# Patient Record
Sex: MALE | Race: WHITE | NOT HISPANIC OR LATINO | Employment: OTHER | URBAN - METROPOLITAN AREA
[De-identification: names, ages, dates, MRNs, and addresses within clinical notes are randomized per-mention and may not be internally consistent; named-entity substitution may affect disease eponyms.]

---

## 2017-01-19 ENCOUNTER — OFFICE VISIT - HEALTHEAST (OUTPATIENT)
Dept: INTERNAL MEDICINE | Facility: CLINIC | Age: 77
End: 2017-01-19

## 2017-01-19 DIAGNOSIS — G47.00 INSOMNIA, UNSPECIFIED: ICD-10-CM

## 2017-01-19 DIAGNOSIS — F41.9 ANXIETY: ICD-10-CM

## 2017-01-19 DIAGNOSIS — E78.5 HYPERLIPEMIA: ICD-10-CM

## 2017-01-19 DIAGNOSIS — M17.11 PRIMARY OSTEOARTHRITIS OF RIGHT KNEE: ICD-10-CM

## 2017-01-19 LAB
CHOLEST SERPL-MCNC: 179 MG/DL
FASTING STATUS PATIENT QL REPORTED: YES
HDLC SERPL-MCNC: 59 MG/DL
LDLC SERPL CALC-MCNC: 111 MG/DL
TRIGL SERPL-MCNC: 46 MG/DL

## 2017-01-19 ASSESSMENT — MIFFLIN-ST. JEOR: SCORE: 1654.27

## 2017-01-20 ENCOUNTER — COMMUNICATION - HEALTHEAST (OUTPATIENT)
Dept: INTERNAL MEDICINE | Facility: CLINIC | Age: 77
End: 2017-01-20

## 2017-03-20 ENCOUNTER — TELEPHONE (OUTPATIENT)
Dept: GASTROENTEROLOGY | Facility: OUTPATIENT CENTER | Age: 77
End: 2017-03-20

## 2017-03-22 ENCOUNTER — TELEPHONE (OUTPATIENT)
Dept: GASTROENTEROLOGY | Facility: OUTPATIENT CENTER | Age: 77
End: 2017-03-22

## 2017-03-22 NOTE — TELEPHONE ENCOUNTER
Patient taking any blood thinners ? Advised pt. To stop aspirin 2 days prior to exam    Heart disease ? denies    Lung disease ? denies      Sleep apnea ? Cpap    Diabetic ? denies    Kidney disease ? denies    Dialysis ? n/a    Electronic implanted medical devices ? denies    Are you taking any narcotic pain medication ? no  What is your daily dosage ?    PTSD ? n/a    Prep instructions reviewed with patient ? Instructions,  policy, MAC sedation plan reviewed. Advised pt. To have someone stay with him post exam    Pharmacy : [facility]    Indication for procedure : family hx colon cancer    Referring provider : self

## 2017-03-26 ENCOUNTER — COMMUNICATION - HEALTHEAST (OUTPATIENT)
Dept: INTERNAL MEDICINE | Facility: CLINIC | Age: 77
End: 2017-03-26

## 2017-03-26 DIAGNOSIS — G47.00 INSOMNIA, UNSPECIFIED: ICD-10-CM

## 2017-03-27 ENCOUNTER — TRANSFERRED RECORDS (OUTPATIENT)
Dept: HEALTH INFORMATION MANAGEMENT | Facility: CLINIC | Age: 77
End: 2017-03-27

## 2017-04-04 ENCOUNTER — RECORDS - HEALTHEAST (OUTPATIENT)
Dept: ADMINISTRATIVE | Facility: OTHER | Age: 77
End: 2017-04-04

## 2017-04-11 ENCOUNTER — RECORDS - HEALTHEAST (OUTPATIENT)
Dept: ADMINISTRATIVE | Facility: OTHER | Age: 77
End: 2017-04-11

## 2017-04-12 ENCOUNTER — COMMUNICATION - HEALTHEAST (OUTPATIENT)
Dept: INTERNAL MEDICINE | Facility: CLINIC | Age: 77
End: 2017-04-12

## 2017-04-12 ENCOUNTER — OFFICE VISIT - HEALTHEAST (OUTPATIENT)
Dept: INTERNAL MEDICINE | Facility: CLINIC | Age: 77
End: 2017-04-12

## 2017-04-12 DIAGNOSIS — Z96.651 STATUS POST RIGHT KNEE REPLACEMENT: ICD-10-CM

## 2017-04-12 DIAGNOSIS — S52.90XA: ICD-10-CM

## 2017-04-12 DIAGNOSIS — Z01.818 PREOP EXAM FOR INTERNAL MEDICINE: ICD-10-CM

## 2017-04-12 ASSESSMENT — MIFFLIN-ST. JEOR: SCORE: 1712.78

## 2017-04-13 LAB
ATRIAL RATE - MUSE: 54 BPM
DIASTOLIC BLOOD PRESSURE - MUSE: NORMAL MMHG
INTERPRETATION ECG - MUSE: NORMAL
P AXIS - MUSE: 12 DEGREES
PR INTERVAL - MUSE: 288 MS
QRS DURATION - MUSE: 98 MS
QT - MUSE: 426 MS
QTC - MUSE: 403 MS
R AXIS - MUSE: -23 DEGREES
SYSTOLIC BLOOD PRESSURE - MUSE: NORMAL MMHG
T AXIS - MUSE: 10 DEGREES
VENTRICULAR RATE- MUSE: 54 BPM

## 2017-04-14 ENCOUNTER — RECORDS - HEALTHEAST (OUTPATIENT)
Dept: ADMINISTRATIVE | Facility: OTHER | Age: 77
End: 2017-04-14

## 2017-04-25 ENCOUNTER — RECORDS - HEALTHEAST (OUTPATIENT)
Dept: ADMINISTRATIVE | Facility: OTHER | Age: 77
End: 2017-04-25

## 2017-05-02 ENCOUNTER — TELEPHONE (OUTPATIENT)
Dept: SURGERY | Facility: CLINIC | Age: 77
End: 2017-05-02

## 2017-05-02 DIAGNOSIS — D36.9 ADENOMATOUS POLYPS: Primary | ICD-10-CM

## 2017-05-02 NOTE — TELEPHONE ENCOUNTER
Father Gagan is calling for Dr. Mancia. He reports after his last colonoscopy 3/27 Dr. Mancia mentioned he should get genetic testing done, new additional testing now available.    1. Will check with Lyla for orders.  2.  Dr. Mancia still doing colonoscopies at [facility]? A friend of his called to get appointment and was told he isn't there anymore.    Will route to Lyla, dr. Mancia's clinic nurse.

## 2017-05-08 NOTE — TELEPHONE ENCOUNTER
Left message with patient that the Cancer Risk Management Program will contact him for appt.  Message sent to CRMP schedulers.      Also informed that Dr. Mancia does colonoscopies at [facility].

## 2017-05-23 ENCOUNTER — COMMUNICATION - HEALTHEAST (OUTPATIENT)
Dept: INTERNAL MEDICINE | Facility: CLINIC | Age: 77
End: 2017-05-23

## 2017-05-23 DIAGNOSIS — G47.00 INSOMNIA, UNSPECIFIED: ICD-10-CM

## 2017-05-26 ENCOUNTER — RECORDS - HEALTHEAST (OUTPATIENT)
Dept: ADMINISTRATIVE | Facility: OTHER | Age: 77
End: 2017-05-26

## 2017-06-05 ENCOUNTER — OFFICE VISIT (OUTPATIENT)
Dept: ONCOLOGY | Facility: CLINIC | Age: 77
End: 2017-06-05
Attending: GENETIC COUNSELOR, MS
Payer: MEDICARE

## 2017-06-05 DIAGNOSIS — Z80.3 FAMILY HISTORY OF MALIGNANT NEOPLASM OF BREAST: ICD-10-CM

## 2017-06-05 DIAGNOSIS — D12.6 BENIGN NEOPLASM OF COLON, UNSPECIFIED PART OF COLON: Primary | ICD-10-CM

## 2017-06-05 DIAGNOSIS — Z80.0 FAMILY HISTORY OF COLON CANCER: ICD-10-CM

## 2017-06-05 DIAGNOSIS — D12.6 BENIGN NEOPLASM OF COLON, UNSPECIFIED PART OF COLON: ICD-10-CM

## 2017-06-05 PROCEDURE — 96040 ZZH GENETIC COUNSELING, EACH 30 MINUTES: CPT | Mod: ZF | Performed by: GENETIC COUNSELOR, MS

## 2017-06-05 NOTE — NURSING NOTE
Chief Complaint   Patient presents with     Blood Draw     blood labs collected from  arn venipuncuture, vitals taken     Sari Church RN

## 2017-06-05 NOTE — LETTER
"    Cancer Risk Management  Program Locations    [facility]  [facility]  Mailing Address  Cancer Risk Management Program  [facility]  [address] [place] [address]    New patient appointments  [phone]  June 7, 2017    Gagan FUENTES Gerson  [address]   [facility] [address]      Dear Father Bruce,    It was a pleasure meeting with you at the [facility] on June 5, 2017. Here is a copy of the progress note from your recent genetic counseling visit to the Cancer Risk Management Program. If you have any additional questions, please feel free to call.    6/5/2017    Referring Provider: Jefferson Mancia MD    Presenting Information:   I met with Father Gagan \"Bruce\" Gerson today for genetic counseling at the Cancer Risk Management Program at the [facility] to discuss his personal history of colon polyps and family history of colon cancer. He is here today to review this history, cancer screening recommendations, and available genetic testing options.    Of note, he was previously [place] by Marisa Latif [place], [facility] [place] and genetic testing for Nuñez syndrome was ordered (sequencing and deletion/duplication analysis of MLH1, MSH2, MSH6, and PMS2, as well as deletion/duplication analysis of EPCAM). This testing was normal/negative. Please see the clinic notes from 5/14/2014 and 8/14/2014 for more information.    Personal History:  Father Bruce is a 77 year old male. He does not have any personal history of cancer.    Father Bruce's most recent colonoscopy in March 2017 was normal and follow-up was recommended in three years. Previous colonoscopies in July 1996, October 2008, April 2012, and March 2013 identified a total of three tubular adenomas and one hyperplastic polyp. A colonoscopy report " from 1990 notes a further history of colon polyps, though no records are available regarding those prior exams. An upper endoscopy was performed in June 2015 to address abdominal pain that was normal. His most recent PSA in June 2015 was normal (2.70). He does not regularly do any other cancer screening at this time. Father Bruce reported no tobacco use and only occupational alcohol use.    Updated Family History: (Please see updated scanned pedigree for detailed family history information)    Father Bruce clarified that his daughter had a colon polyp removed in her 40's that contained a cancer; no other treatment was necessary.    Father Bruce reported that there are no other updates to his family history; please see the pedigree from 5/14/2014 for more detailed family history information.    Discussion:    Father Bruce's personal history of colon polyps and family history of colon cancer is suggestive of a possible hereditary cancer syndrome or familial clustering of cancer.    We reviewed the features of sporadic, familial, and hereditary cancers.  In looking at Father Bruce's family history, it is possible that a cancer susceptibility gene is present as he has three close relatives that have been diagnosed with colorectal cancer; one of these relatives was also diagnosed with cancer under age 50. It is also important to note that he has limited information regarding his extended relatives, which may underestimate the chance for a hereditary cancer syndrome. Other cancers in his family, such as his sister's breast cancer and brother's lung cancer, may be more consistent with sporadic/random cancers.    We discussed the natural history and genetics of several hereditary cancer syndromes, including Nuñez syndrome. A detailed handout regarding these syndromes and the information we discussed was provided to Gagan at the end of our appointment today and can be found in the after visit summary.  Topics included:  inheritance pattern, cancer risks, cancer screening recommendations, and also risks, benefits and limitations of testing.    We reviewed Father Bruce's prior genetic testing, which did not identify any mutations in the five genes associated with Nuñez syndrome (MLH1, MSH2, MSH6, PMS2, and EPCAM). Since 2014, though, a new test is available that can identify rare mutations in the MSH2 gene that could not be identified previously. As such, we reviewed that it would be reasonable to test the genes associated with Nuñez syndrome again. Of note, Gagan's relatives with colon cancer meet [place] II criteria.      We discussed that there are also other additional genes that could cause increased risk of colon cancer and polyps. For example, mutations in the CHEK2 gene are associated with moderately increased risk for colon and breast cancer. Also, mutations in the POLD1 and POLE genes can be identified in families that meet [place] criteria. As many of these genes present with overlapping features in a family, it would be reasonable for Father Bruce to consider panel genetic testing to analyze multiple genes at once.    Father Bruce explained that he would be interested in gathering as much information as possible from genetic testing. As such, we reviewed the ColoNext gene panel.    Genetic testing is available for 17 genes associated with increased risk for colon cancer and polyps: ColoNext (APC, BMPR1A, CDH1, CHEK2, GREM1, EPCAM, MLH1, MSH2, MSH6, MUTYH, PMS2, POLD1, POLE, PTEN, SMAD4, STK11, and TP53).    We discussed that some of the genes in the ColoNext panel are associated with specific hereditary cancer syndromes and have published management guidelines: Nuñez syndrome (MLH1, MSH2, MSH6, PMS2, EPCAM), Familial Adenomatous Polyposis (APC), MUTYH Associated Polyposis (MUTYH), Juvenile Polyposis syndrome (SMAD4, BMPR1A), Peutz-Jeghers syndrome (STK11), Cowden syndrome (PTEN), Li Fraumeni syndrome (TP53), and  Hereditary Diffuse Gastric Cancer (CDH1).     The CHEK2, GREM1, POLD1, and POLE genes have also been associated with increased colon cancer risk and have published management guidelines.    Father Bruce was provided with a detailed brochure from [employer] explaining the ColoNext testing.    Consent was obtained and genetic testing for ColoNext was sent to [facility]. Turn around time: 5 weeks.    Medical Management: The information from genetic testing may determine additional cancer screening recommendations (i.e. more frequent colonoscopies and/or upper endoscopies, thyroid exams, annual dermatologic exams, etc.) as well as options for risk reducing surgeries (i.e. surgery to remove the colon, etc.) for Gagan and his relatives. These recommendations will be discussed in detail once genetic testing is completed.    Plan:  1) Today Gagan elected to proceed with genetic testing using the ColoNext gene panel offered by [employer].  2) This information should be available in approximately 4 weeks.  3) Gagan will return to clinic to discuss the results.    Face to face time: 40 minutes    Laura Francis MS, [facility]  Certified Genetic Counselor  Office: [phone]  Pager: [phone]

## 2017-06-05 NOTE — PROGRESS NOTES
"6/5/2017    Referring Provider: Jefferson Mancia MD    Presenting Information:   I met with Father Gagan \"Bruce\"Gerson today for genetic counseling at the Cancer Risk Management Program at the [facility] to discuss his personal history of colon polyps and family history of colon cancer.  He is here today to review this history, cancer screening recommendations, and available genetic testing options.    Of note, he was previously seen by Marisa Latif MS, [facility] on 5/14/2014 and genetic testing for Nuñez syndrome was ordered (sequencing and deletion/duplication analysis of MLH1, MSH2, MSH6, and PMS2, as well as deletion/duplication analysis of EPCAM). This testing was normal/negative. Please see the clinic notes from 5/14/2014 and 8/14/2014 for more information.    Personal History:  Father Bruce is a 77 year old male. He does not have any personal history of cancer.    Father Bruce's most recent colonoscopy in March 2017 was normal and follow-up was recommended in three years. Previous colonoscopies in July 1996, October 2008, April 2012, and March 2013 identified a total of three tubular adenomas and one hyperplastic polyp. A colonoscopy report from 1990 notes a further history of colon polyps, though no records are available regarding those prior exams. An upper endoscopy was performed in June 2015 to address abdominal pain that was normal. His most recent PSA in June 2015 was normal (2.70). He does not regularly do any other cancer screening at this time. Father Bruce reported no tobacco use and only occupational alcohol use.    Updated Family History: (Please see updated scanned pedigree for detailed family history information)    Father Bruce clarified that his daughter had a colon polyp removed in her 40's that contained a cancer; no other treatment was necessary.    Father Bruce reported that there are no other updates to his family history; please see the pedigree from 5/14/2014 for more detailed " family history information.    Discussion:    Father Bruce's personal history of colon polyps and family history of colon cancer is suggestive of a possible hereditary cancer syndrome or familial clustering of cancer.    We reviewed the features of sporadic, familial, and hereditary cancers.  In looking at Father Bruce's family history, it is possible that a cancer susceptibility gene is present as he has three close relatives that have been diagnosed with colorectal cancer; one of these relatives was also diagnosed with cancer under age 50. It is also important to note that he has limited information regarding his extended relatives, which may underestimate the chance for a hereditary cancer syndrome. Other cancers in his family, such as his sister's breast cancer and brother's lung cancer, may be more consistent with sporadic/random cancers.    We discussed the natural history and genetics of several hereditary cancer syndromes, including Nuñez syndrome. A detailed handout regarding these syndromes and the information we discussed was provided to Gagan at the end of our appointment today and can be found in the after visit summary.  Topics included: inheritance pattern, cancer risks, cancer screening recommendations, and also risks, benefits and limitations of testing.    We reviewed Father Bruce's prior genetic testing, which did not identify any mutations in the five genes associated with Nuñez syndrome (MLH1, MSH2, MSH6, PMS2, and EPCAM). Since 2014, though, a new test is available that can identify rare mutations in the MSH2 gene that could not be identified previously. As such, we reviewed that it would be reasonable to test the genes associated with Nuñze syndrome again. Of note, Gagan's relatives with colon cancer meet [place] II criteria.      We discussed that there are also other additional genes that could cause increased risk of colon cancer and polyps. For example, mutations in the CHEK2 gene are  associated with moderately increased risk for colon and breast cancer. Also, mutations in the POLD1 and POLE genes can be identified in families that meet [place] criteria. As many of these genes present with overlapping features in a family, it would be reasonable for Father Bruce to consider panel genetic testing to analyze multiple genes at once.    Father Bruce explained that he would be interested in gathering as much information as possible from genetic testing. As such, we reviewed the ColoNext gene panel.    Genetic testing is available for 17 genes associated with increased risk for colon cancer and polyps: ColoNext (APC, BMPR1A, CDH1, CHEK2, GREM1, EPCAM, MLH1, MSH2, MSH6, MUTYH, PMS2, POLD1, POLE, PTEN, SMAD4, STK11, and TP53).    We discussed that some of the genes in the ColoNext panel are associated with specific hereditary cancer syndromes and have published management guidelines: Nuñez syndrome (MLH1, MSH2, MSH6, PMS2, EPCAM), Familial Adenomatous Polyposis (APC), MUTYH Associated Polyposis (MUTYH), Juvenile Polyposis syndrome (SMAD4, BMPR1A), Peutz-Jeghers syndrome (STK11), Cowden syndrome (PTEN), Li Fraumeni syndrome (TP53), and Hereditary Diffuse Gastric Cancer (CDH1).     The CHEK2, GREM1, POLD1, and POLE genes have also been associated with increased colon cancer risk and have published management guidelines.    Father Bruce was provided with a detailed brochure from [employer] explaining the ColoNext testing.    Consent was obtained and genetic testing for ColoNext was sent to [facility]. Turn around time: 5 weeks.    Medical Management: The information from genetic testing may determine additional cancer screening recommendations (i.e. more frequent colonoscopies and/or upper endoscopies, thyroid exams, annual dermatologic exams, etc.) as well as options for risk reducing surgeries (i.e. surgery to remove the colon, etc.) for Gagan and his relatives. These recommendations  will be discussed in detail once genetic testing is completed.    Plan:  1) Today Gagan elected to proceed with genetic testing using the ColoNext gene panel offered by [employer].  2) This information should be available in approximately 4 weeks.  3) Gagan will return to clinic to discuss the results.    Face to face time: 45 minutes    Laura Francis MS, [facility]  Certified Genetic Counselor  Office: [phone]  Pager: [phone]

## 2017-06-05 NOTE — LETTER
"6/5/2017       RE: Gagan Nieto  [address]  [place] [address]     Dear Colleague,    Thank you for referring your patient, Gagan Nieto, to the [facility]. Please see a copy of my visit note below.    6/5/2017    Referring Provider: Jefferson Mancia MD    Presenting Information:   I met with Father Gagan \"Bruce\"Gerson today for genetic counseling at the Cancer Risk Management Program at the [facility] to discuss his personal history of colon polyps and family history of colon cancer.  He is here today to review this history, cancer screening recommendations, and available genetic testing options.    Of note, he was previously seen by Marisa Latif MS, [facility] on 5/14/2014 and genetic testing for Nuñez syndrome was ordered (sequencing and deletion/duplication analysis of MLH1, MSH2, MSH6, and PMS2, as well as deletion/duplication analysis of EPCAM). This testing was normal/negative. Please see the clinic notes from 5/14/2014 and 8/14/2014 for more information.    Personal History:  Father Bruce is a 77 year old male. He does not have any personal history of cancer.    Father Bruce's most recent colonoscopy in March 2017 was normal and follow-up was recommended in three years. Previous colonoscopies in July 1996, October 2008, April 2012, and March 2013 identified a total of three tubular adenomas and one hyperplastic polyp. A colonoscopy report from 1990 notes a further history of colon polyps, though no records are available regarding those prior exams. An upper endoscopy was performed in June 2015 to address abdominal pain that was normal. His most recent PSA in June 2015 was normal (2.70). He does not regularly do any other cancer screening at this time. Father Bruce reported no tobacco use and only occupational alcohol use.    Updated Family History: (Please see updated scanned pedigree for detailed family history information)    Father Bruce clarified that his " daughter had a colon polyp removed in her 40's that contained a cancer; no other treatment was necessary.    Father Bruce reported that there are no other updates to his family history; please see the pedigree from 5/14/2014 for more detailed family history information.    Discussion:    Father Bruce's personal history of colon polyps and family history of colon cancer is suggestive of a possible hereditary cancer syndrome or familial clustering of cancer.    We reviewed the features of sporadic, familial, and hereditary cancers.  In looking at Father Bruce's family history, it is possible that a cancer susceptibility gene is present as he has three close relatives that have been diagnosed with colorectal cancer; one of these relatives was also diagnosed with cancer under age 50. It is also important to note that he has limited information regarding his extended relatives, which may underestimate the chance for a hereditary cancer syndrome. Other cancers in his family, such as his sister's breast cancer and brother's lung cancer, may be more consistent with sporadic/random cancers.    We discussed the natural history and genetics of several hereditary cancer syndromes, including Nuñez syndrome. A detailed handout regarding these syndromes and the information we discussed was provided to Gagan at the end of our appointment today and can be found in the after visit summary.  Topics included: inheritance pattern, cancer risks, cancer screening recommendations, and also risks, benefits and limitations of testing.    We reviewed Father Bruce's prior genetic testing, which did not identify any mutations in the five genes associated with Nuñez syndrome (MLH1, MSH2, MSH6, PMS2, and EPCAM). Since 2014, though, a new test is available that can identify rare mutations in the MSH2 gene that could not be identified previously. As such, we reviewed that it would be reasonable to test the genes associated with Nuñez syndrome again.  Of note, Gagan's relatives with colon cancer meet [place] II criteria.      We discussed that there are also other additional genes that could cause increased risk of colon cancer and polyps. For example, mutations in the CHEK2 gene are associated with moderately increased risk for colon and breast cancer. Also, mutations in the POLD1 and POLE genes can be identified in families that meet [place] criteria. As many of these genes present with overlapping features in a family, it would be reasonable for Father Bruce to consider panel genetic testing to analyze multiple genes at once.    Father Bruce explained that he would be interested in gathering as much information as possible from genetic testing. As such, we reviewed the ColoNext gene panel.    Genetic testing is available for 17 genes associated with increased risk for colon cancer and polyps: ColoNext (APC, BMPR1A, CDH1, CHEK2, GREM1, EPCAM, MLH1, MSH2, MSH6, MUTYH, PMS2, POLD1, POLE, PTEN, SMAD4, STK11, and TP53).    We discussed that some of the genes in the ColoNext panel are associated with specific hereditary cancer syndromes and have published management guidelines: Nuñez syndrome (MLH1, MSH2, MSH6, PMS2, EPCAM), Familial Adenomatous Polyposis (APC), MUTYH Associated Polyposis (MUTYH), Juvenile Polyposis syndrome (SMAD4, BMPR1A), Peutz-Jeghers syndrome (STK11), Cowden syndrome (PTEN), Li Fraumeni syndrome (TP53), and Hereditary Diffuse Gastric Cancer (CDH1).     The CHEK2, GREM1, POLD1, and POLE genes have also been associated with increased colon cancer risk and have published management guidelines.    Father Bruce was provided with a detailed brochure from [employer] explaining the ColoNext testing.    Consent was obtained and genetic testing for ColoNext was sent to [facility]. Turn around time: 5 weeks.    Medical Management: The information from genetic testing may determine additional cancer screening recommendations (i.e.  more frequent colonoscopies and/or upper endoscopies, thyroid exams, annual dermatologic exams, etc.) as well as options for risk reducing surgeries (i.e. surgery to remove the colon, etc.) for Gagan and his relatives. These recommendations will be discussed in detail once genetic testing is completed.    Plan:  1) Today Gagan elected to proceed with genetic testing using the ColoNext gene panel offered by [employer].  2) This information should be available in approximately 4 weeks.  3) Gagan will return to clinic to discuss the results.    Face to face time: 45 minutes    Laura Francis MS, [facility]  Certified Genetic Counselor  Office: [phone]  Pager: [phone]

## 2017-06-05 NOTE — PATIENT INSTRUCTIONS
Assessing Cancer Risk  Only about 5-10% of cancers are thought to be due to an inherited cancer susceptibility gene.    These families often have:  ? Several people with the same or related types of cancer  ? Cancers diagnosed at a young age (before age 50)  ? Individuals with more than one primary cancer  ? Multiple generations of the family affected with cancer    Comprehensive Colon Cancer Panel  We each inherit two copies of every gene in our bodies: one from our mother, and one from our father.  Each gene has a specific job to do.  When a gene has a mistake or  mutation  in it, it does not work like it should.      This handout will review common hereditary colon cancer syndromes, and other genes related to an increased risk for colon cancer.  The genes that will be discussed in this handout are: APC, BMPR1A, CDH1, CHEK2, EPCAM, GREM1, MLH1, MSH2, MSH6, MUTYH, PMS2, POLD1, POLE, PTEN, SMAD4, STK11, and TP53.  These genes are clinically actionable, meaning there are published guidelines for cancer screening and management for individuals who are found to carry mutations in these genes. Inheriting a mutation does not mean a person will develop cancer, but it does significantly increase his or her risk above the general population risk.      Familial Adenomatous Polyposis (FAP)  FAP is a hereditary cancer syndrome caused by mutations in the APC gene. The condition is known to cause hundreds to thousands of adenomatous polyps in the colon, creating a  carpet  of polyps. Some individuals have what is called attenuated FAP (AFAP), a milder form of FAP with fewer polyps and typically later onset. Individuals with an APC gene mutation are at an increased risk for colon, thyroid, and duodenal cancers, as well as several other types of cancer1.  Other features of this condition may include: osteomas, dental anomalies, benign skin lesions, CHRPE ( freckle  on the inside of the eye), and desmoid tumors.      Lifetime  Cancer Risks     Cancer Type General Population FAP   Colon  5% near 100%   Thyroid (papillary) 1% 1-12%   Duodenal <1% 5%   Liver  <1% 1-2% before age 5   Pancreas <1% 1%   Stomach <1% 1%       Juvenile Polyposis Syndrome (JPS)  JPS is characterized by hamartomatous polyps, called juvenile polyps, in the gastrointestinal tract.  Juvenile  refers to the type of polyps seen in this hereditary cancer condition, not the age of onset. Currently, mutations in two genes are known to cause JPS: BMPR1A and SMAD4. Of individuals clinically diagnosed with JPS, only 40% have an identifiable mutation in one of these genes, suggesting there are other genes that cause JPS that have not been discovered yet. Individuals with JPS are at an increased risk for colon cancer and stomach cancer 2,3,4. Pancreatic and small bowel cancers have also been reported in JPS, but the actual risks are unknown.         Lifetime Cancer Risks    Cancer Type General Population JPS   Colon 5% 40-50%   Gastric/Duodenal <1% 10-21%     Some individuals with SMAD4 mutations have a condition called JPS/HHT (Juvenile Polyposis/Hereditary Hemorrhagic Telangiectasia) where in addition to JPS, individuals may have nose bleeds and clotting issues.     Hereditary Diffuse Gastric Cancer (HDGC)  Currently, mutations in one gene are known to cause Hereditary Diffuse Gastric Cancer: CDH1.  Individuals with HDGC are at increased risk for diffuse gastric cancer and lobular breast cancer. Of people diagnosed with HDGC, 30-50% have a mutation in the CDH1 gene.  This suggests there are likely mutations in other genes that may cause HDGC that have not been identified yet. Individuals with HDGC may also be at increased risk for colon cancer.      Lifetime Cancer Risks    Cancer Type General Population HDGC   Diffuse Gastric <1% 67-83%   Breast 12% 39-52%     Nuñez syndrome  Mutations in five different genes are known to cause Nuñez syndrome: MLH1, MSH2, MSH6, PMS2, and  EPCAM. Individuals with Nuñez syndrome have an increased risk for colon, uterine, ovarian, small bowel, stomach, urinary tract, and brain cancer, as well as several other types of cancer. The exact lifetime cancer risks are dependent upon the gene in which the mutation was identified.      Lifetime Cancer Risks    Cancer Type General Population Nuñez syndrome   Colon 5% 10-80%   Uterine 2-3% 15-60%   Stomach <1% 6-13%   Ovarian 2% 4-24%   Urinary tract <1% 1-7%   Hepatobiliary tract <1% 1-4%   Small bowel <1% 3-6%   Brain/CNS <1% 1-3%   Pancreas <1% 1-6%       MUTYH-Associated Polyposis (MAP)  MAP is a hereditary cancer syndrome caused by mutations in the MUTYH gene. Unlike the other hereditary cancer genes discussed in this handout, two mutations in the MUTYH gene cause MAP and increase cancer risk. Those affected with MAP typically have between  adenomatous polyps. This syndrome also increases the risk for colon and duodenal cancer. Current research suggests that other cancers may be associated with MUTYH mutations, as well. The table below includes the risk that someone with two MUTYH gene mutations would develop cancer in their lifetime; of note, there is also an increased colon cancer risk for individuals who carry only one MUTYH gene mutation5,6,7.       Lifetime Cancer Risks    Cancer Type General Population MAP   Colon 5% %   Duodenal  <1% 5%       Cowden syndrome  Cowden syndrome is a hereditary condition that increases the risk for breast, thyroid, endometrial, colon, and kidney cancer.  A single mutation in the PTEN gene causes Cowden syndrome and increases cancer risk.  The table below shows the chance that someone with a PTEN mutation would develop cancer in their lifetime8,9.  Other benign features seen in some individuals with Cowden syndrome include benign skin lesions (facial papules, keratoses, lipomas), learning disabilities, autism, thyroid nodules, hamartomatous colon polyps, and  larger head size.      Lifetime Cancer Risks   Cancer Type General Population Cowden Syndrome   Breast 12% 25-50%*   Thyroid 1% 35%   Renal 1-2% 35%   Endometrial 2-3% 28%   Colon 5% 9%   Melanoma 2-3% >5%     *One recent study found breast cancer risk to be increased to 85%    Peutz-Jeghers syndrome (PJS)  PJS is a hereditary cancer syndrome caused by mutations in the STK11 gene. This condition can be distinguished from other hereditary syndromes by the presence of hamartomatous polyps in the gastrointestinal tract and freckles present in unusual places such as the hands, feet, neck, and lips. Individuals with Peutz-Jeghers syndrome have an increased risk for colon, breast, pancreatic, and other cancers3.  Men are at risk for testicular tumors which can affect hormones in the body. Women are at risk for sex cord tumors of the ovaries and a rare aggressive type of cervical cancer.     Lifetime Cancer Risks    Cancer Type General Population PJS   Breast 12% 45-50%   Colon 5% 39%   Stomach <1% 29%   Pancreas 1.5% 11-36%   Small Intestine <1% 13%     Ovarian  2%  18%   Lung 6-7% 15-17%     Additional Genes Associated with Increased Colon Cancer Risk  CHEK2  CHEK2 is a moderate-risk breast cancer gene.  Women who have a mutation in CHEK2 have around a 2-fold increased risk for breast cancer compared to the general population, and this risk may be higher depending upon family history.12,13,14.  Mutations in CHEK2 have also been shown to increase the risk of a number of other cancers, including colon and prostate, however these cancer risks are currently not well understood.     GREM1  GREM1 is a moderate-risk colon polyposis gene. Duplications of this gene are more commonly found in individuals with [employer] [id number]. Mutations in GREM1 are associated with colon polyps and therefore an increased risk of colon cancer; however the estimated cancer risk is not well [id number].     POLD1 and POLE  POLD1 and POLE  are moderate-risk colon cancer genes. Carriers of a mutation in one of these genes increases the lifetime risk of colorectal [id number],18,19,20. Mutations in these genes may also be associated with increased risk for other cancers including: endometrial cancer, duodenal adenomas and carcinomas, and brain tumors.    TP53  Li Fraumeni syndrome (LFS) is a hereditary cancer predisposition syndrome. LFS is caused by a mutation in the TP53 gene. A single mutation in one of the copies of TP53 increases the risk for multiple cancers. Individuals with LFS are at an increased risk for developing cancer at a young age. The general lifetime risk for development of cancer is 50% by age 30 and 90% by age 60.      Core Cancers: Sarcomas, Breast, Brain, Lung, Leukemias/Lymphomas, Adrenocortical carcinomas  Other Cancers: Gastrointestinal, Thyroid, Skin, Genitourinary    Genetic Testing  Genetic testing involves a simple blood test and will look at the genetic information in genes associated with an increased risk of colon cancer. The tests look for any harmful mutations that are associated with increased cancer risk.  If possible, it is recommended that the person(s) who has had cancer be tested before other family members.  That person will give us the most useful information about whether or not a specific gene mutation is associated with the cancer in the family.     Results  There are three possible results from genetic testing:  ? Positive--a harmful mutation was identified  ? Negative--no mutation was identified  ? Variant of unknown significance--a variation in one of the genes was identified, but it is unclear how this impacts cancer risk in the family  Advantages and Disadvantages  There are advantages and disadvantages to genetic testing of these genes.    Advantages  ? May clarify your cancer risk  ? Can help you make medical decisions  ? May explain the cancers in your family  ? May give useful information to your family  members (if you share your results)    Disadvantages  ? Possible negative emotional impact of learning about inherited cancer risk  ? Uncertainty in interpreting a negative test result in some situations  ? Possible genetic discrimination concerns (see below)    Inheritance   Most mutations in the genes outlined above are inherited in an autosomal dominant pattern.  This means that if a parent has a mutation, each of his or her children will have a 50% chance of inheriting that same mutation.  Therefore, each child--male or female--would have a 50% chance of being at increased risk for developing cancer.                                            Image obtained from [employer] Reference, 2013     In the case of MUTYH-Associated Polyposis (MAP) this hereditary cancer syndrome is inherited in an autosomal recessive pattern. This means that each parent of an individual with MAP is a carrier of MAP, meaning that they have only one mutation in MUTYH. They still have one functioning copy of their gene.  Carriers are at a slightly higher risk for colon cancer than the general population. If each parent is a carrier for MAP, they have a 25% of having a child who is affected with MAP, meaning the child inherited both gene mutations - one from each parent.       Image obtained from [employer] Reference, 2016    Genetic Information Nondiscrimination Act (TOD)  TOD is a federal law that protects individuals from health insurance or employment discrimination based on a genetic test result alone.  Although rare, there are currently no legal protections in terms of life insurance, long term care, or disability insurances.  Visit the National Human Genome Research [place] at Genome.gov/65970970 to learn more.    Reducing Cancer Risk  Each of the genes listed within this handout have nationally recognized cancer screening guidelines that would be recommended for individuals who test positive.  In addition to increased  cancer screening, surgeries may be offered or recommended to reduce cancer risk in certain cases.  Recommendations are based upon an individual s genetic test result as well as their personal and family history of cancer.    Questions to Think About Regarding Genetic Testing  ? What effect will the test result have on me and my relationship with my family members if I have an inherited gene mutation?  If I don t have a gene mutation?  ? Should I share my test results, and how will my family react to this news, which may also affect them?  ? Are my children ready to learn new information that may one day affect their own health?    Resources    PTEN World PTENworld.[employer]   No Stomach for Cancer, Inc. nostomachforcancer.org   Stomach Cancer Relief Network scrnet.org   Collaborative Group of the Americas on Inherited Colorectal Cancer (CGA) cgaicc.com   Cancer Care cancercare.org   American Cancer Society (ACS) cancer.org   National Cancer [place] (NCI) cancer.org   Nuñez Syndrome International [email or web address]       Please call us if you have any questions or concerns.     Cancer Risk Management Program [phone]-CANCER ([phone])  ? Ronna Daryl, MS, [facility]  [phone]  ? Gale Chanel, MS, [facility]  [phone]  ? Laura Francis, MS, [facility]  [phone]  ? Rosalina Eldridge, MS, [facility]  [phone]  ? Tiaraon Eriberto, MS, [facility]  [phone]    References    1. Shon ROA, Tano J, Mohan G, Bruce-Renny E, Roma J, et al. The Prevalence of thyroid cancer and benign thyroid disease in patients with familial adenomatous polyposis may be higher than previously recognized. Clin Colorectal Cancer. 2012;11:304-308.  2. Alphonsos L, Van Liliana A, Kat L, Omega C, Ruchi K, et al. Risk of colorectal cancer in juvenile polyposis. Gut. 2007;56:965-967.  3. Henry FG, Dom MN, Robin CA. Colorectal cancer risk in hamartomatous polyposis syndromes. World Journal of Gastrointestinal Surgery. 2015;27:25-32  4. Marley CARMICHAEL.  Guidance on gastrointestinal surveillance for hereditary non-polyposis colorectal cancer, familial adenomatous polypolis, juvenile polyposis, and Peutz-Jeghers syndrome. Gut. 2002;51:21-27.  5. Harley AK, Mika PENNY, Lois JG et al. Risk of extracolonic cancers for people with biallelic and monoallelic mutations in MUTYH. Int J of Cancer. 2016;139:5525-9246.  6. Allan S, Jayesh S, Silviano H, Anahi K, Blankenship M, et al. MUTYH-associated polyposis: 70 of 71 patients with biallelic mutations present with an attenuated or atypical phenotype. Int J of Cancer. 2006;119:807-814.  7. Marvin G, Theresa F, Luis I, Henrique M, Marvin H, et al. MUTYH mutation carriers have increased breast cancer risk. Cancer. 2012;5345-5774.  8. Tafoya MH, Marii J, Yannick J, July LA, Vero MS, Eng C. Lifetime cancer risks in individuals with germline PTEN mutations. Clin Cancer Res. 2012;18:400-7.  9. Ildefonso R. Cowden Syndrome: A Critical Review of the Clinical Literature. J Nica . 2009:18:13-27.  10. National Comprehensive Cancer Network. Clinical practice guidelines in oncology, colorectal cancer screening. Available online (registration required). 2013.  11. National Cancer [place]. SEER Cancer Stat Fact Sheets.  December 2013.  12. CHEK2 Breast Cancer Case-Control Consortium. CHEK2*1100delC and susceptibility to breast cancer: A collaborative analysis involving 10,860 breast cancer cases and 9,065 controls from 10 studies. Am J Hum Nica, 74 (2004), pp. 3656-0013  13. Niki T, Levon S, Edilberto K, et al. Spectrum of Mutations in BRCA1, BRCA2, CHEK2, and TP53 in Families at High Risk of Breast Cancer. CHETAN. 2006;295(12):4392-6308.  14. Ronald KENDALL, Romy D, Zain A, et al. Risk of breast cancer in women with a CHEK2 mutation with and without a family history of breast cancer. J Clin Oncol. 2011;29:2030-4909.  15. Jama FUENTES, Maria Antonia ROA, Phong J, Mar N, Luis MENSAH et al. Defining the polyposis/colorectal cancer phenotype  associated with the GREM1 duplication: counseling and management guidelines. Nica .Res. 2016;98:1-5.  16. Jelani ROA, Jennifre S, Fahad A, Abebe Rios, et al. Hereditary mixed polyposis syndrome is caused by a 40kb upstream duplication that leads to increased and ectopic expression of the BMP antagonist GREM1. Joellen Nica. 2015;44:699-703.  17. FAIZA Gatica. et al. Germline mutations affecting the proofreading domains of POLE and POLD1 predispose to colorectal adenomas and carcinomas. Joellen. Nica. 45, 136-44 (2013).  18. SCAR Welsh. et al. POLE and POLD1 mutations in 529 desiree with familial colorectal cancer and/or polyposis: review of reported cases and recommendations for genetic testing and surveillance. Nica. Med. (2015). doi:10.1038/gim.2015.75  19. CHRISTOPHE Menjivar et al. New insights into POLE and POLD1 germline mutations in familial colorectal cancer and polyposis. Hum. Mol. Nica. 23, 7776-12 (2014).  20. TELMA Whitt. et al. Frequency and phenotypic spectrum of germline mutations in POLE and seven other polymerase genes in 266 patients with colorectal adenomas and carcinomas. Int. J. Cancer 137, 320-31 (2015).

## 2017-06-05 NOTE — MR AVS SNAPSHOT
After Visit Summary   6/5/2017    Gagan Nieto    MRN: [id number]           Patient Information     Date Of Birth          1940        Visit Information        Provider Department      6/5/2017 11:15 AM Laura Francis GC;  2 [address] CONSULT [facility]        Today's Diagnoses     Benign neoplasm of colon, unspecified part of colon    -  1    Family history of colon cancer        Family history of malignant neoplasm of breast          Care Instructions      Assessing Cancer Risk  Only about 5-10% of cancers are thought to be due to an inherited cancer susceptibility gene.    These families often have:  ? Several people with the same or related types of cancer  ? Cancers diagnosed at a young age (before age 50)  ? Individuals with more than one primary cancer  ? Multiple generations of the family affected with cancer    Comprehensive Colon Cancer Panel  We each inherit two copies of every gene in our bodies: one from our mother, and one from our father.  Each gene has a specific job to do.  When a gene has a mistake or  mutation  in it, it does not work like it should.      This handout will review common hereditary colon cancer syndromes, and other genes related to an increased risk for colon cancer.  The genes that will be discussed in this handout are: APC, BMPR1A, CDH1, CHEK2, EPCAM, GREM1, MLH1, MSH2, MSH6, MUTYH, PMS2, POLD1, POLE, PTEN, SMAD4, STK11, and TP53.  These genes are clinically actionable, meaning there are published guidelines for cancer screening and management for individuals who are found to carry mutations in these genes. Inheriting a mutation does not mean a person will develop cancer, but it does significantly increase his or her risk above the general population risk.      Familial Adenomatous Polyposis (FAP)  FAP is a hereditary cancer syndrome caused by mutations in the APC gene. The condition is known to cause hundreds to thousands of adenomatous  polyps in the colon, creating a  carpet  of polyps. Some individuals have what is called attenuated FAP (AFAP), a milder form of FAP with fewer polyps and typically later onset. Individuals with an APC gene mutation are at an increased risk for colon, thyroid, and duodenal cancers, as well as several other types of cancer1.  Other features of this condition may include: osteomas, dental anomalies, benign skin lesions, CHRPE ( freckle  on the inside of the eye), and desmoid tumors.      Lifetime Cancer Risks     Cancer Type General Population FAP   Colon  5% near 100%   Thyroid (papillary) 1% 1-12%   Duodenal <1% 5%   Liver  <1% 1-2% before age 5   Pancreas <1% 1%   Stomach <1% 1%       Juvenile Polyposis Syndrome (JPS)  JPS is characterized by hamartomatous polyps, called juvenile polyps, in the gastrointestinal tract.  Juvenile  refers to the type of polyps seen in this hereditary cancer condition, not the age of onset. Currently, mutations in two genes are known to cause JPS: BMPR1A and SMAD4. Of individuals clinically diagnosed with JPS, only 40% have an identifiable mutation in one of these genes, suggesting there are other genes that cause JPS that have not been discovered yet. Individuals with JPS are at an increased risk for colon cancer and stomach cancer 2,3,4. Pancreatic and small bowel cancers have also been reported in JPS, but the actual risks are unknown.         Lifetime Cancer Risks    Cancer Type General Population JPS   Colon 5% 40-50%   Gastric/Duodenal <1% 10-21%     Some individuals with SMAD4 mutations have a condition called JPS/HHT (Juvenile Polyposis/Hereditary Hemorrhagic Telangiectasia) where in addition to JPS, individuals may have nose bleeds and clotting issues.     Hereditary Diffuse Gastric Cancer (HDGC)  Currently, mutations in one gene are known to cause Hereditary Diffuse Gastric Cancer: CDH1.  Individuals with HDGC are at increased risk for diffuse gastric cancer and lobular  breast cancer. Of people diagnosed with HDGC, 30-50% have a mutation in the CDH1 gene.  This suggests there are likely mutations in other genes that may cause HDGC that have not been identified yet. Individuals with HDGC may also be at increased risk for colon cancer.      Lifetime Cancer Risks    Cancer Type General Population HDGC   Diffuse Gastric <1% 67-83%   Breast 12% 39-52%     Nuñez syndrome  Mutations in five different genes are known to cause Nuñez syndrome: MLH1, MSH2, MSH6, PMS2, and EPCAM. Individuals with Nuñez syndrome have an increased risk for colon, uterine, ovarian, small bowel, stomach, urinary tract, and brain cancer, as well as several other types of cancer. The exact lifetime cancer risks are dependent upon the gene in which the mutation was identified.      Lifetime Cancer Risks    Cancer Type General Population Nuñez syndrome   Colon 5% 10-80%   Uterine 2-3% 15-60%   Stomach <1% 6-13%   Ovarian 2% 4-24%   Urinary tract <1% 1-7%   Hepatobiliary tract <1% 1-4%   Small bowel <1% 3-6%   Brain/CNS <1% 1-3%   Pancreas <1% 1-6%       MUTYH-Associated Polyposis (MAP)  MAP is a hereditary cancer syndrome caused by mutations in the MUTYH gene. Unlike the other hereditary cancer genes discussed in this handout, two mutations in the MUTYH gene cause MAP and increase cancer risk. Those affected with MAP typically have between  adenomatous polyps. This syndrome also increases the risk for colon and duodenal cancer. Current research suggests that other cancers may be associated with MUTYH mutations, as well. The table below includes the risk that someone with two MUTYH gene mutations would develop cancer in their lifetime; of note, there is also an increased colon cancer risk for individuals who carry only one MUTYH gene mutation5,6,7.       Lifetime Cancer Risks    Cancer Type General Population MAP   Colon 5% %   Duodenal  <1% 5%       Cowden syndrome  Cowden syndrome is a hereditary  condition that increases the risk for breast, thyroid, endometrial, colon, and kidney cancer.  A single mutation in the PTEN gene causes Cowden syndrome and increases cancer risk.  The table below shows the chance that someone with a PTEN mutation would develop cancer in their lifetime8,9.  Other benign features seen in some individuals with Cowden syndrome include benign skin lesions (facial papules, keratoses, lipomas), learning disabilities, autism, thyroid nodules, hamartomatous colon polyps, and larger head size.      Lifetime Cancer Risks   Cancer Type General Population Cowden Syndrome   Breast 12% 25-50%*   Thyroid 1% 35%   Renal 1-2% 35%   Endometrial 2-3% 28%   Colon 5% 9%   Melanoma 2-3% >5%     *One recent study found breast cancer risk to be increased to 85%    Peutz-Jeghers syndrome (PJS)  PJS is a hereditary cancer syndrome caused by mutations in the STK11 gene. This condition can be distinguished from other hereditary syndromes by the presence of hamartomatous polyps in the gastrointestinal tract and freckles present in unusual places such as the hands, feet, neck, and lips. Individuals with Peutz-Jeghers syndrome have an increased risk for colon, breast, pancreatic, and other cancers3.  Men are at risk for testicular tumors which can affect hormones in the body. Women are at risk for sex cord tumors of the ovaries and a rare aggressive type of cervical cancer.     Lifetime Cancer Risks    Cancer Type General Population PJS   Breast 12% 45-50%   Colon 5% 39%   Stomach <1% 29%   Pancreas 1.5% 11-36%   Small Intestine <1% 13%     Ovarian  2%  18%   Lung 6-7% 15-17%     Additional Genes Associated with Increased Colon Cancer Risk  CHEK2  CHEK2 is a moderate-risk breast cancer gene.  Women who have a mutation in CHEK2 have around a 2-fold increased risk for breast cancer compared to the general population, and this risk may be higher depending upon family history.12,13,14.  Mutations in CHEK2 have also  been shown to increase the risk of a number of other cancers, including colon and prostate, however these cancer risks are currently not well understood.     GREM1  GREM1 is a moderate-risk colon polyposis gene. Duplications of this gene are more commonly found in individuals with Ashkenazi Anglican [id number]. Mutations in GREM1 are associated with colon polyps and therefore an increased risk of colon cancer; however the estimated cancer risk is not well [id number].     POLD1 and POLE  POLD1 and POLE are moderate-risk colon cancer genes. Carriers of a mutation in one of these genes increases the lifetime risk of colorectal crghnl98,18,19,20. Mutations in these genes may also be associated with increased risk for other cancers including: endometrial cancer, duodenal adenomas and carcinomas, and brain tumors.    TP53  Li Fraumeni syndrome (LFS) is a hereditary cancer predisposition syndrome. LFS is caused by a mutation in the TP53 gene. A single mutation in one of the copies of TP53 increases the risk for multiple cancers. Individuals with LFS are at an increased risk for developing cancer at a young age. The general lifetime risk for development of cancer is 50% by age 30 and 90% by age 60.      Core Cancers: Sarcomas, Breast, Brain, Lung, Leukemias/Lymphomas, Adrenocortical carcinomas  Other Cancers: Gastrointestinal, Thyroid, Skin, Genitourinary    Genetic Testing  Genetic testing involves a simple blood test and will look at the genetic information in genes associated with an increased risk of colon cancer. The tests look for any harmful mutations that are associated with increased cancer risk.  If possible, it is recommended that the person(s) who has had cancer be tested before other family members.  That person will give us the most useful information about whether or not a specific gene mutation is associated with the cancer in the family.     Results  There are three possible results from genetic  testing:  ? Positive--a harmful mutation was identified  ? Negative--no mutation was identified  ? Variant of unknown significance--a variation in one of the genes was identified, but it is unclear how this impacts cancer risk in the family  Advantages and Disadvantages  There are advantages and disadvantages to genetic testing of these genes.    Advantages  ? May clarify your cancer risk  ? Can help you make medical decisions  ? May explain the cancers in your family  ? May give useful information to your family members (if you share your results)    Disadvantages  ? Possible negative emotional impact of learning about inherited cancer risk  ? Uncertainty in interpreting a negative test result in some situations  ? Possible genetic discrimination concerns (see below)    Inheritance   Most mutations in the genes outlined above are inherited in an autosomal dominant pattern.  This means that if a parent has a mutation, each of his or her children will have a 50% chance of inheriting that same mutation.  Therefore, each child--male or female--would have a 50% chance of being at increased risk for developing cancer.                                            Image obtained from [employer], 2013     In the case of MUTYH-Associated Polyposis (MAP) this hereditary cancer syndrome is inherited in an autosomal recessive pattern. This means that each parent of an individual with MAP is a carrier of MAP, meaning that they have only one mutation in MUTYH. They still have one functioning copy of their gene.  Carriers are at a slightly higher risk for colon cancer than the general population. If each parent is a carrier for MAP, they have a 25% of having a child who is affected with MAP, meaning the child inherited both gene mutations - one from each parent.       Image obtained from [employer], 2016    Genetic Information Nondiscrimination Act (TOD)  TOD is a federal law that protects individuals  from health insurance or employment discrimination based on a genetic test result alone.  Although rare, there are currently no legal protections in terms of life insurance, long term care, or disability insurances.  Visit the National Human Genome Research [place] at Genome.gov/36284595 to learn more.    Reducing Cancer Risk  Each of the genes listed within this handout have nationally recognized cancer screening guidelines that would be recommended for individuals who test positive.  In addition to increased cancer screening, surgeries may be offered or recommended to reduce cancer risk in certain cases.  Recommendations are based upon an individual s genetic test result as well as their personal and family history of cancer.    Questions to Think About Regarding Genetic Testing  ? What effect will the test result have on me and my relationship with my family members if I have an inherited gene mutation?  If I don t have a gene mutation?  ? Should I share my test results, and how will my family react to this news, which may also affect them?  ? Are my children ready to learn new information that may one day affect their own health?    Resources    [facility] World [email or web address]   No Stomach for Cancer, Inc. [email or web address]   Stomach Cancer Relief Network [email or web address]   Collaborative Group of the Americas on Inherited Colorectal Cancer (CGA) cgaicc.com   Cancer Care cancercare.[email or web address]   American Cancer Society (ACS) cancer.org   National Cancer [place] (NCI) cancer.org   Nuñez Syndrome International [email or web address]       Please call us if you have any questions or concerns.     Cancer Risk Management Program [phone]-CANCER ([phone])  ? Ronna Brito, MS, [facility]  [phone]  ? Gale Buchanan, MS, [facility]  [phone]  ? Laura Francis, MS, [facility]  [phone]  ? Rosalina Eldridge, MS, [facility]  [phone]  ? Dorita Wei, MS, [facility]  [phone]    References    1. Shon ROA, Tano FUENTES, Marques CANALES, Dulce ROA, Roma FUENTES, et al. The  Prevalence of thyroid cancer and benign thyroid disease in patients with familial adenomatous polyposis may be higher than previously recognized. Clin Colorectal Cancer. 2012;11:304-308.  2. José Miguel ALDRIDGE, Ricardo Ford A, Kat ALDRIDGE, Omega KENDALL, Ruchi ANN, et al. Risk of colorectal cancer in juvenile polyposis. Gut. 2007;56:965-967.  3. Henry FG, Dom MN, Robin CA. Colorectal cancer risk in hamartomatous polyposis syndromes. World Journal of Gastrointestinal Surgery. 2015;27:25-32  4. Marley MG. Guidance on gastrointestinal surveillance for hereditary non-polyposis colorectal cancer, familial adenomatous polypolis, juvenile polyposis, and Peutz-Jeghers syndrome. Gut. 2002;51:21-27.  5. Harley AK, Mika PENNY, Lois FUENTESG et al. Risk of extracolonic cancers for people with biallelic and monoallelic mutations in MUTYH. Int J of Cancer. 2016;139:3750-7350.  6. Allan S, Jayesh S, Silviano H, Anahi K, Blankenship M, et al. MUTYH-associated polyposis: 70 of 71 patients with biallelic mutations present with an attenuated or atypical phenotype. Int J of Cancer. 2006;119:807-814.  7. Marvin G, Theresa F, Luis I, Pinleobardo M, Marvin H, et al. MUTYH mutation carriers have increased breast cancer risk. Cancer. 2012;6668-6276.  8. Tafoya MH, Marii J, Yannick J, July LA, Vero MS, Eng C. Lifetime cancer risks in individuals with germline PTEN mutations. Clin Cancer Res. 2012;18:400-7.  9. Pilarski R. Cowden Syndrome: A Critical Review of the Clinical Literature. J Nica . 2009:18:13-27.  10. National Comprehensive Cancer Network. Clinical practice guidelines in oncology, colorectal cancer screening. Available online (registration required). 2013.  11. National Cancer [place]. SEER Cancer Stat Fact Sheets.  December 2013.  12. CHEK2 Breast Cancer Case-Control Consortium. CHEK2*1100delC and susceptibility to breast cancer: A collaborative analysis involving 10,860 breast cancer cases and 9,065 controls from 10 studies. Am  J Hum Nica, 74 (2004), pp. 2003-6987  13. Niki T, Levon S, Edilberto K, et al. Spectrum of Mutations in BRCA1, BRCA2, CHEK2, and TP53 in Families at High Risk of Breast Cancer. CHETAN. 2006;295(12):1144-6515.  14. Ronald KENDALL, Romy D, Zain FLORENCE, et al. Risk of breast cancer in women with a CHEK2 mutation with and without a family history of breast cancer. J Clin Oncol. 2011;29:3108-0113.  15. Jama FUENTES, Maria Antonia E, Phong J, Mar N, Luis M et al. Defining the polyposis/colorectal cancer phenotype associated with the GREM1 duplication: counseling and management guidelines. Nica .Res. 2016;98:1-5.  16. Jelani ROA, Jennifer S, Fahad FLORENCE, Abebe Rios, et al. Hereditary mixed polyposis syndrome is caused by a 40kb upstream duplication that leads to increased and ectopic expression of the BMP antagonist GREM1. Joellen Nica. 2015;44:699-703.  17. FAIZA Gatica. et al. Germline mutations affecting the proofreading domains of POLE and POLD1 predispose to colorectal adenomas and carcinomas. Joellen. Nica. 45, 136-44 (2013).  18. SCAR Welsh. et al. POLE and POLD1 mutations in 529 desiree with familial colorectal cancer and/or polyposis: review of reported cases and recommendations for genetic testing and surveillance. Nica. Med. (2015). doi:10.1038/gim.2015.75  19. CHRISTOPHE Menjivar et al. New insights into POLE and POLD1 germline mutations in familial colorectal cancer and polyposis. Hum. Mol. Nica. 23, 1966-12 (2014).  20. Peri I. et al. Frequency and phenotypic spectrum of germline mutations in POLE and seven other polymerase genes in 266 patients with colorectal adenomas and carcinomas. Int. J. Cancer 137, 320-31 (2015).           Follow-ups after your visit        Follow-up notes from your care team     Return in about 4 weeks (around 7/3/2017).      Your next 10 appointments already scheduled     Jun 05, 2017 12:30 PM [facility]   [facility] Lab Draw with  e[facility] LAB DRAW   [facility] Lab Draw ([facility]  "[place])    [address] [id number] [phone]              Future tests that were ordered for you today     Open Future Orders        Priority Expected Expires Ordered    [facility] genetic testing, [place] Test: Laboratory Miscellaneous Order Routine  2018            Who to contact     If you have questions or need follow up information about today's clinic visit or your schedule please contact [facility] directly at [phone].  Normal or non-critical lab and imaging results will be communicated to you by Neomendhart, letter or phone within 4 business days after the clinic has received the results. If you do not hear from us within 7 days, please contact the clinic through [facility] Kid[employer] or phone. If you have a critical or abnormal lab result, we will notify you by phone as soon as possible.  Submit refill requests through ClipClock or call your pharmacy and they will forward the refill request to us. Please allow 3 business days for your refill to be completed.          Additional Information About Your Visit        ClipClock Information     ClipClock lets you send messages to your doctor, view your test results, renew your prescriptions, schedule appointments and more. To sign up, go to [email or web address] . Click on \"Log in\" on the left side of the screen, which will take you to the Welcome page. Then click on \"Sign up Now\" on the right side of the page.     You will be asked to enter the access code listed below, as well as some personal information. Please follow the directions to create your username and password.     Your access code is: [email or web address]  Expires: 2017  3:33 PM     Your access code will  in 90 days. If you need help or a new code, please call your [facility] clinic or [phone].        Care EveryWhere ID     This is your Care EveryWhere ID. This could be used by other organizations to access your [facility] medical " records  [id number]         Blood Pressure from Last 3 Encounters:   09/14/15 128/77   08/27/15 102/73   08/03/15 114/65    Weight from Last 3 Encounters:   09/14/15 98 kg (216 lb)   08/27/15 101.5 kg (223 lb 12.8 oz)   08/03/15 104.6 kg (230 lb 11.2 oz)               Primary Care Provider    No Ref-Primary Verified       No address on file        Thank you!     Thank you for choosing [facility]  for your care. Our goal is always to provide you with excellent care. Hearing back from our patients is one way we can continue to improve our services. Please take a few minutes to complete the written survey that you may receive in the mail after your visit with us. Thank you!             Your Updated Medication List - Protect others around you: Learn how to safely use, store and throw away your medicines at www.disposemymeds.org.          This list is accurate as of: 6/5/17 12:10 PM.  Always use your most recent med list.                   Brand Name Dispense Instructions for use    aspirin 81 MG tablet      Take 1 tablet by mouth daily.       AZITHROMYCIN PO      Take 250 mg by mouth daily       cholecalciferol 1000 UNIT tablet    vitamin D     Take 5 tablets by mouth daily.       fluticasone 50 MCG/ACT spray    FLONASE    2 Package    Spray 1 spray into both nostrils 2 times daily       METAMUCIL PO          MIRALAX PO          omeprazole 40 MG capsule    priLOSEC    90 capsule    Take 1 capsule (40 mg) by mouth daily       ranitidine 300 MG tablet    ZANTAC    30 tablet    Take 1 tablet (300 mg) by mouth At Bedtime       REFRESH 1 % ophthalmic solution   Generic drug:  carboxymethylcellulose      Place 1 drop into both eyes 3 times daily.       * tamsulosin 0.4 MG capsule    FLOMAX    90 capsule    Take 1 capsule (0.4 mg) by mouth daily       * tamsulosin 0.4 MG capsule    FLOMAX    60 capsule    Take 1 capsule (0.4 mg) by mouth daily       zolpidem 10 MG tablet    AMBIEN    30 tablet    Take 1  tablet (10 mg) by mouth nightly as needed for sleep       * Notice:  This list has 2 medication(s) that are the same as other medications prescribed for you. Read the directions carefully, and ask your doctor or other care provider to review them with you.

## 2017-06-06 LAB — MISCELLANEOUS TEST: NORMAL

## 2017-07-02 LAB — LAB SCANNED RESULT: NORMAL

## 2017-07-03 ENCOUNTER — OFFICE VISIT - HEALTHEAST (OUTPATIENT)
Dept: INTERNAL MEDICINE | Facility: CLINIC | Age: 77
End: 2017-07-03

## 2017-07-03 DIAGNOSIS — A69.20 LYME DISEASE: ICD-10-CM
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
## 2017-07-07 ENCOUNTER — OFFICE VISIT (OUTPATIENT)
Dept: ONCOLOGY | Facility: CLINIC | Age: 77
End: 2017-07-07
Attending: GENETIC COUNSELOR, MS
Payer: MEDICARE

## 2017-07-07 DIAGNOSIS — D12.6 BENIGN NEOPLASM OF COLON, UNSPECIFIED PART OF COLON: Primary | ICD-10-CM

## 2017-07-07 DIAGNOSIS — Z80.0 FAMILY HISTORY OF COLON CANCER: ICD-10-CM

## 2017-07-07 PROCEDURE — 40000072 ZZH STATISTIC GENETIC COUNSELING, < 16 MIN: Mod: ZF | Performed by: GENETIC COUNSELOR, MS

## 2017-07-07 NOTE — MR AVS SNAPSHOT
"              After Visit Summary   2017    Gagan Nieto    MRN: [id number]           Patient Information     Date Of Birth          1940        Visit Information        Provider Department      2017 3:30 PM Laura Francis GC;  [address] [facility]        Today's Diagnoses     Benign neoplasm of colon, unspecified part of colon    -  1    Family history of colon cancer           Follow-ups after your visit        Who to contact     If you have questions or need follow up information about today's clinic visit or your schedule please contact [facility] directly at [phone].  Normal or non-critical lab and imaging results will be communicated to you by QUIQhart, letter or phone within 4 business days after the clinic has received the results. If you do not hear from us within 7 days, please contact the clinic through QUIQhart or phone. If you have a critical or abnormal lab result, we will notify you by phone as soon as possible.  Submit refill requests through Quack or call your pharmacy and they will forward the refill request to us. Please allow 3 business days for your refill to be completed.          Additional Information About Your Visit        MyChart Information     Quack lets you send messages to your doctor, view your test results, renew your prescriptions, schedule appointments and more. To sign up, go to [email or web address] . Click on \"Log in\" on the left side of the screen, which will take you to the Welcome page. Then click on \"Sign up Now\" on the right side of the page.     You will be asked to enter the access code listed below, as well as some personal information. Please follow the directions to create your username and password.     Your access code is: [email or web address]  Expires: 2017  3:33 PM     Your access code will  in 90 days. If you need help or a new code, please call your [place] clinic or [phone].   "      Care EveryWhere ID     This is your Care EveryWhere ID. This could be used by other organizations to access your [place] medical records  [id number]         Blood Pressure from Last 3 Encounters:   09/14/15 128/77   08/27/15 102/73   08/03/15 114/65    Weight from Last 3 Encounters:   09/14/15 98 kg (216 lb)   08/27/15 101.5 kg (223 lb 12.8 oz)   08/03/15 104.6 kg (230 lb 11.2 oz)              Today, you had the following     No orders found for display       Primary Care Provider    No Ref-Primary Verified       No address on file        Equal Access to Services     [facility] : Hadii amy Stout, lona henry, jean elam, ginette love . So [facility] [phone].    ATENCIÓN: Si habla español, tiene a andino disposición servicios gratuitos de asistencia lingüística. [facility] [phone].    We comply with applicable federal civil rights laws and [place] laws. We do not discriminate on the basis of race, color, national origin, age, disability sex, sexual orientation or gender identity.            Thank you!     Thank you for choosing [facility]  for your care. Our goal is always to provide you with excellent care. Hearing back from our patients is one way we can continue to improve our services. Please take a few minutes to complete the written survey that you may receive in the mail after your visit with us. Thank you!             Your Updated Medication List - Protect others around you: Learn how to safely use, store and throw away your medicines at www.disposemymeds.org.          This list is accurate as of: 7/7/17 11:59 PM.  Always use your most recent med list.                   Brand Name Dispense Instructions for use Diagnosis    aspirin 81 MG tablet      Take 1 tablet by mouth daily.        AZITHROMYCIN PO      Take 250 mg by mouth daily        cholecalciferol 1000 UNIT tablet    vitamin D     Take 5 tablets by mouth daily.         fluticasone 50 MCG/ACT spray    FLONASE    2 Package    Spray 1 spray into both nostrils 2 times daily    Allergic rhinitis, cause unspecified       METAMUCIL PO           MIRALAX PO           omeprazole 40 MG capsule    priLOSEC    90 capsule    Take 1 capsule (40 mg) by mouth daily    Esophageal reflux       ranitidine 300 MG tablet    ZANTAC    30 tablet    Take 1 tablet (300 mg) by mouth At Bedtime    Esophageal reflux       REFRESH 1 % ophthalmic solution   Generic drug:  carboxymethylcellulose      Place 1 drop into both eyes 3 times daily.        * tamsulosin 0.4 MG capsule    FLOMAX    90 capsule    Take 1 capsule (0.4 mg) by mouth daily    BPH (benign prostatic hyperplasia)       * tamsulosin 0.4 MG capsule    FLOMAX    60 capsule    Take 1 capsule (0.4 mg) by mouth daily    BPH (benign prostatic hyperplasia)       zolpidem 10 MG tablet    AMBIEN    30 tablet    Take 1 tablet (10 mg) by mouth nightly as needed for sleep    Insomnia       * Notice:  This list has 2 medication(s) that are the same as other medications prescribed for you. Read the directions carefully, and ask your doctor or other care provider to review them with you.

## 2017-07-07 NOTE — LETTER
Cancer Risk Management  Program Locations    [facility]  [facility]  Mailing Address  Cancer Risk Management Program  [facility]  [address] [place] [address]    New patient appointments  [phone]  July 8, 2017    Gagan Nieto  [address]   [place] [address]      Dear Father Bruce,    It was a pleasure meeting with you again at the [facility] on July 7, 2017. Here is a copy of the progress note from your recent genetic counseling visit to the Cancer Risk Management Program. If you have any additional questions, please feel free to call.    7/7/2017    Referring Provider: Jefferson Mancia MD    Presenting Information:  Father Gagan Nieto returned to the Cancer Risk Management Program at the [facility] to discuss his genetic testing results. His blood was drawn on 6/5/2017.  Nuñez Syndrome Analyses with the ColoNext gene panel was ordered from [employer]. This testing was done because of his personal history of colon polyps and family history of colon cancer.    Genetic Testing Result: NEGATIVE  Father Bruce is negative for mutations in the APC, BMPR1A, CDH1, CHEK2, EPCAM, GREM1, MLH1, MSH2, MSH6, MUTYH, PMS2, POLD1, POLE, PTEN, SMAD4, STK11, and TP53 genes. No mutations were found in any of the 17 genes analyzed.  This test involved sequencing and deletion/duplication analysis of all genes with the exceptions of EPCAM and GREM1 (deletions and duplications only).    Testing did not detect an identifiable mutation associated with Nuñez syndrome (MLH1, MSH2, MSH6, PMS2, EPCAM), Familial Adenomatous Polyposis (APC), Hereditary Diffuse Gastric Cancer (CDH1), Juvenile Polyposis syndrome (BMPR1A, SMAD4), Cowden syndrome (PTEN), Li Fraumeni syndrome (TP53), Peutz-Jeghers  "syndrome (STK11), and MUTYH Associated Polyposis (MUTYH).     A copy of the test report can be found in the Laboratory tab, dated 6/5/2017, and named \"SEND OUTS [facility] TEST\". The report is scanned in as a linked document.    Interpretation:  We discussed several different interpretations of this negative test result.    1. One explanation may be that there is a different gene or combination of genes and environment that are associated with Father Bruce's colon polyps and family history of colon cancer. We reviewed that additional genetic testing may be available in the future that can identify a genetic/hereditary explanation for his history. As such, Father Bruce is encouraged to contact me annually to review any new genetic testing options that may be appropriate for him.    2. It is possible that another relative, such as his brothers and/or mother with colon cancer, did have a mutation in one of these 17 genes and Father Bruce did not inherit it. If that is the case, his colon polyps may be sporadic findings caused by random cellular changes.  3. There is also a small possibility that there is a mutation in one of these genes, and we could not find it with our current testing methods.       Screening:  Based on this negative test result, it is important for Father Bruce and his relatives to refer back to the family history for appropriate cancer screening.      Father Bruce should continue to follow all colon cancer screening recommendations as made by his medical providers. For example, it was recommended he have another colonoscopy three years after his last exam in 2017.  If his daughter was diagnosed with colon cancer: per current National Comprehensive Cancer Network (NCCN) guidelines, individuals with a first degree relative with colorectal cancer should begin colonoscopy at age 40, or 10 years before the earliest diagnosis of colorectal cancer, whichever is first. This would mean Father Bruce's sons would begin " colonoscopy in the mid 30's and repeated every 5-10 years, or per colonoscopy findings.  If his daughter did not have colon cancer: per current NCCN guidelines, individuals with a second-degree relative with  colon cancer diagnosed less than age 50, should start colonoscopy at age 50, and should be repeated every 5-10 years, or per colonoscopy findings. This recommendation may instead apply to Father Bruce's sons.     Father Bruce is strongly encouraged to clarify whether his daughter was diagnosed with colon cancer in order to better understand which of the above screening recommendations are appropriate for his sons.      Other population cancer screening options, such as those recommended by the American Cancer Society and NCCN, are appropriate for Father Bruce and his family. These screening recommendations may change if there are changes to Father Bruce's personal and/or family history.  Final screening recommendations should be made by each individual's managing physician.    Inheritance:  We reviewed the autosomal dominant inheritance of mutations in these 17 genes.  We discussed that Father Bruce did not pass on an identifiable mutation in these genes to his children based on this test result.  Mutations in these gene do not skip generations.      Additional Testing:  Although Father Bruce's genetic testing result was negative, other relatives may still carry a gene mutation associated with hereditary colon cancer. Genetic counseling is recommended for his daughter to discuss genetic testing options if she was diagnosed with colon cancer. The children of his brother with colon cancer could also consider meeting with a genetic counselor.    Summary:  We do not have an explanation for Father Bruce's personal history of polyps and family history of colon cancer. Because of that, it is important that he continue with cancer screening based on his personal and family history as discussed above.    Genetic testing is  rapidly advancing, and new cancer susceptibility genes will most likely be identified in the future.  Therefore, I encouraged Father Bruce to contact me annually or if there are changes in his personal or family history.  This may change how we assess his cancer risk, screening, and the testing we would offer.    Plan:  1.  I provided Father Bruce with a copy of his test results today.  2. He plans to follow-up with his medical providers, including Dr. Mancia.  3. He should contact me annually, or sooner if his family history changes.    If Father Bruce has any further questions, I encouraged him to contact me at [phone].    Time spent face to face: 15 minutes    Laura Francis MS, JD [facility]  Certified Genetic Counselor  Office: [phone]  Pager: [phone]

## 2017-07-07 NOTE — PROGRESS NOTES
"7/7/2017    Referring Provider: Jefferson Mancia MD    Presenting Information:  Father Gagan Nieto returned to the Cancer Risk Management Program at the [facility] to discuss his genetic testing results. His blood was drawn on 6/5/2017.  Nuñez Syndrome Analyses with the ColoNext gene panel was ordered from [employer]. This testing was done because of his personal history of colon polyps and family history of colon cancer.    Genetic Testing Result: NEGATIVE  Father Bruce is negative for mutations in the APC, BMPR1A, CDH1, CHEK2, EPCAM, GREM1, MLH1, MSH2, MSH6, MUTYH, PMS2, POLD1, POLE, PTEN, SMAD4, STK11, and TP53 genes. No mutations were found in any of the 17 genes analyzed.  This test involved sequencing and deletion/duplication analysis of all genes with the exceptions of EPCAM and GREM1 (deletions and duplications only).    Testing did not detect an identifiable mutation associated with Nuñez syndrome (MLH1, MSH2, MSH6, PMS2, EPCAM), Familial Adenomatous Polyposis (APC), Hereditary Diffuse Gastric Cancer (CDH1), Juvenile Polyposis syndrome (BMPR1A, SMAD4), Cowden syndrome (PTEN), Li Fraumeni syndrome (TP53), Peutz-Jeghers syndrome (STK11), and MUTYH Associated Polyposis (MUTYH).     A copy of the test report can be found in the Laboratory tab, dated 6/5/2017, and named \"SEND OUTS [facility] [place] [facility] TEST\". The report is scanned in as a linked document.    Interpretation:  We discussed several different interpretations of this negative test result.    1. One explanation may be that there is a different gene or combination of genes and environment that are associated with Father Bruce's colon polyps and family history of colon cancer. We reviewed that additional genetic testing may be available in the future that can identify a genetic/hereditary explanation for his history. As such, Father Bruce is encouraged to contact me annually to review any new genetic testing options that may be appropriate for him.    2. It " is possible that another relative, such as his brothers and/or mother with colon cancer, did have a mutation in one of these 17 genes and Father Bruce did not inherit it. If that is the case, his colon polyps may be sporadic findings caused by random cellular changes.  3. There is also a small possibility that there is a mutation in one of these genes, and we could not find it with our current testing methods.       Screening:  Based on this negative test result, it is important for Father Bruce and his relatives to refer back to the family history for appropriate cancer screening.      Father Bruce should continue to follow all colon cancer screening recommendations as made by his medical providers. For example, it was recommended he have another colonoscopy three years after his last exam in 2017.  If his daughter was diagnosed with colon cancer: per current National Comprehensive Cancer Network (NCCN) guidelines, individuals with a first degree relative with colorectal cancer should begin colonoscopy at age 40, or 10 years before the earliest diagnosis of colorectal cancer, whichever is first. This would mean Father Bruce's sons would begin colonoscopy in the mid 30's and repeated every 5-10 years, or per colonoscopy findings.  If his daughter did not have colon cancer: per current NCCN guidelines, individuals with a second-degree relative with  colon cancer diagnosed less than age 50, should start colonoscopy at age 50, and should be repeated every 5-10 years, or per colonoscopy findings. This recommendation may instead apply to Father Bruce's sons.     Father Bruce is strongly encouraged to clarify whether his daughter was diagnosed with colon cancer in order to better understand which of the above screening recommendations are appropriate for his sons.      Other population cancer screening options, such as those recommended by the American Cancer Society and NCCN, are appropriate for Father Bruce and his family.  These screening recommendations may change if there are changes to Father Bruce's personal and/or family history.  Final screening recommendations should be made by each individual's managing physician.    Inheritance:  We reviewed the autosomal dominant inheritance of mutations in these 17 genes.  We discussed that Father Bruce did not pass on an identifiable mutation in these genes to his children based on this test result.  Mutations in these gene do not skip generations.      Additional Testing:  Although Father Bruce's genetic testing result was negative, other relatives may still carry a gene mutation associated with hereditary colon cancer. Genetic counseling is recommended for his daughter to discuss genetic testing options if she was diagnosed with colon cancer. The children of his brother with colon cancer could also consider meeting with a genetic counselor.    Summary:  We do not have an explanation for Father Bruce's personal history of polyps and family history of colon cancer. Because of that, it is important that he continue with cancer screening based on his personal and family history as discussed above.    Genetic testing is rapidly advancing, and new cancer susceptibility genes will most likely be identified in the future.  Therefore, I encouraged Father Bruce to contact me annually or if there are changes in his personal or family history.  This may change how we assess his cancer risk, screening, and the testing we would offer.    Plan:  1.  I provided Father Bruce with a copy of his test results today.  2. He plans to follow-up with his medical providers, including Dr. Mancia.  3. He should contact me annually, or sooner if his family history changes.    If Father Bruce has any further questions, I encouraged him to contact me at [phone].    Time spent face to face: 15 minutes    Laura Francis MS, [facility]  Certified Genetic Counselor  Office: [phone]  Pager: [phone]

## 2017-07-07 NOTE — LETTER
"7/7/2017       RE: Gagan Nieto  [address]   [place] [address]     Dear Colleague,    Thank you for referring your patient, Gagan Nieto, to the [facility]. Please see a copy of my visit note below.    7/7/2017    Referring Provider: Jefferson Mancia MD    Presenting Information:  Father Gagan Nieto returned to the Cancer Risk Management Program at the [facility] to discuss his genetic testing results. His blood was drawn on 6/5/2017.  Nuñez Syndrome Analyses with the [employer]Next gene panel was ordered from [employer]. This testing was done because of his personal history of colon polyps and family history of colon cancer.    Genetic Testing Result: NEGATIVE  Father Bruce is negative for mutations in the APC, BMPR1A, CDH1, CHEK2, EPCAM, GREM1, MLH1, MSH2, MSH6, MUTYH, PMS2, POLD1, POLE, PTEN, SMAD4, STK11, and TP53 genes. No mutations were found in any of the 17 genes analyzed.  This test involved sequencing and deletion/duplication analysis of all genes with the exceptions of EPCAM and GREM1 (deletions and duplications only).    Testing did not detect an identifiable mutation associated with Nuñez syndrome (MLH1, MSH2, MSH6, PMS2, EPCAM), Familial Adenomatous Polyposis (APC), Hereditary Diffuse Gastric Cancer (CDH1), Juvenile Polyposis syndrome (BMPR1A, SMAD4), Cowden syndrome (PTEN), Li Fraumeni syndrome (TP53), Peutz-Jeghers syndrome (STK11), and MUTYH Associated Polyposis (MUTYH).     A copy of the test report can be found in the Laboratory tab, dated 6/5/2017, and named \"[place] [facility] [place]". The report is scanned in as a linked document.    Interpretation:  We discussed several different interpretations of this negative test result.    1. One explanation may be that there is a different gene or combination of genes and environment that are associated with Father Bruce's colon polyps and family history of colon cancer. We reviewed that additional genetic " testing may be available in the future that can identify a genetic/hereditary explanation for his history. As such, Father Bruce is encouraged to contact me annually to review any new genetic testing options that may be appropriate for him.    2. It is possible that another relative, such as his brothers and/or mother with colon cancer, did have a mutation in one of these 17 genes and Father Bruce did not inherit it. If that is the case, his colon polyps may be sporadic findings caused by random cellular changes.  3. There is also a small possibility that there is a mutation in one of these genes, and we could not find it with our current testing methods.       Screening:  Based on this negative test result, it is important for Father Bruce and his relatives to refer back to the family history for appropriate cancer screening.      Father Bruce should continue to follow all colon cancer screening recommendations as made by his medical providers. For example, it was recommended he have another colonoscopy three years after his last exam in 2017.  If his daughter was diagnosed with colon cancer: per current National Comprehensive Cancer Network (NCCN) guidelines, individuals with a first degree relative with colorectal cancer should begin colonoscopy at age 40, or 10 years before the earliest diagnosis of colorectal cancer, whichever is first. This would mean Father Bruce's sons would begin colonoscopy in the mid 30's and repeated every 5-10 years, or per colonoscopy findings.  If his daughter did not have colon cancer: per current NCCN guidelines, individuals with a second-degree relative with  colon cancer diagnosed less than age 50, should start colonoscopy at age 50, and should be repeated every 5-10 years, or per colonoscopy findings. This recommendation may instead apply to Father Bruce's sons.     Father Bruce is strongly encouraged to clarify whether his daughter was diagnosed with colon cancer in order to better  understand which of the above screening recommendations are appropriate for his sons.      Other population cancer screening options, such as those recommended by the American Cancer Society and NCCN, are appropriate for Father Bruce and his family. These screening recommendations may change if there are changes to Father Bruce's personal and/or family history.  Final screening recommendations should be made by each individual's managing physician.    Inheritance:  We reviewed the autosomal dominant inheritance of mutations in these 17 genes.  We discussed that Father Bruce did not pass on an identifiable mutation in these genes to his children based on this test result.  Mutations in these gene do not skip generations.      Additional Testing:  Although Father Bruce's genetic testing result was negative, other relatives may still carry a gene mutation associated with hereditary colon cancer. Genetic counseling is recommended for his daughter to discuss genetic testing options if she was diagnosed with colon cancer. The children of his brother with colon cancer could also consider meeting with a genetic counselor.    Summary:  We do not have an explanation for Father Bruce's personal history of polyps and family history of colon cancer. Because of that, it is important that he continue with cancer screening based on his personal and family history as discussed above.    Genetic testing is rapidly advancing, and new cancer susceptibility genes will most likely be identified in the future.  Therefore, I encouraged Father Bruce to contact me annually or if there are changes in his personal or family history.  This may change how we assess his cancer risk, screening, and the testing we would offer.    Plan:  1.  I provided Father Bruce with a copy of his test results today.  2. He plans to follow-up with his medical providers, including Dr. Mancia.  3. He should contact me annually, or sooner if his family history  changes.    If Father Bruce has any further questions, I encouraged him to contact me at [phone].    Time spent face to face: 15 minutes    Again, thank you for allowing me to participate in the care of your patient.      Sincerely,    Laura Francis GC

## 2017-07-24 ENCOUNTER — COMMUNICATION - HEALTHEAST (OUTPATIENT)
Dept: INTERNAL MEDICINE | Facility: CLINIC | Age: 77
End: 2017-07-24

## 2017-07-24 DIAGNOSIS — G47.00 INSOMNIA, UNSPECIFIED: ICD-10-CM

## 2017-09-20 ENCOUNTER — COMMUNICATION - HEALTHEAST (OUTPATIENT)
Dept: INTERNAL MEDICINE | Facility: CLINIC | Age: 77
End: 2017-09-20

## 2017-09-20 DIAGNOSIS — G47.00 INSOMNIA, UNSPECIFIED: ICD-10-CM

## 2017-09-26 ENCOUNTER — AMBULATORY - HEALTHEAST (OUTPATIENT)
Dept: NURSING | Facility: CLINIC | Age: 77
End: 2017-09-26

## 2017-09-26 DIAGNOSIS — Z23 INFLUENZA VACCINATION GIVEN: ICD-10-CM

## 2017-09-28 ENCOUNTER — RECORDS - HEALTHEAST (OUTPATIENT)
Dept: ADMINISTRATIVE | Facility: OTHER | Age: 77
End: 2017-09-28

## 2017-10-21 ENCOUNTER — OFFICE VISIT - HEALTHEAST (OUTPATIENT)
Dept: FAMILY MEDICINE | Facility: CLINIC | Age: 77
End: 2017-10-21

## 2017-10-21 DIAGNOSIS — R05.9 COUGH: ICD-10-CM

## 2017-10-21 DIAGNOSIS — J40 BRONCHITIS: ICD-10-CM

## 2017-10-27 ENCOUNTER — COMMUNICATION - HEALTHEAST (OUTPATIENT)
Dept: INTERNAL MEDICINE | Facility: CLINIC | Age: 77
End: 2017-10-27

## 2017-11-22 ENCOUNTER — COMMUNICATION - HEALTHEAST (OUTPATIENT)
Dept: INTERNAL MEDICINE | Facility: CLINIC | Age: 77
End: 2017-11-22

## 2018-01-22 ENCOUNTER — COMMUNICATION - HEALTHEAST (OUTPATIENT)
Dept: INTERNAL MEDICINE | Facility: CLINIC | Age: 78
End: 2018-01-22

## 2018-03-20 ENCOUNTER — COMMUNICATION - HEALTHEAST (OUTPATIENT)
Dept: INTERNAL MEDICINE | Facility: CLINIC | Age: 78
End: 2018-03-20

## 2018-03-21 ENCOUNTER — COMMUNICATION - HEALTHEAST (OUTPATIENT)
Dept: INTERNAL MEDICINE | Facility: CLINIC | Age: 78
End: 2018-03-21

## 2018-03-21 DIAGNOSIS — F32.A DEPRESSION: ICD-10-CM

## 2018-05-30 ENCOUNTER — OFFICE VISIT - HEALTHEAST (OUTPATIENT)
Dept: INTERNAL MEDICINE | Facility: CLINIC | Age: 78
End: 2018-05-30

## 2018-05-30 DIAGNOSIS — L84 CORNS AND CALLOSITIES: ICD-10-CM

## 2018-05-30 DIAGNOSIS — E78.5 HYPERLIPEMIA: ICD-10-CM

## 2018-05-30 DIAGNOSIS — Z00.00 HEALTHCARE MAINTENANCE: ICD-10-CM

## 2018-05-30 DIAGNOSIS — F32.A DEPRESSION: ICD-10-CM

## 2018-05-30 DIAGNOSIS — R79.89 ELEVATED TSH: ICD-10-CM

## 2018-05-30 LAB
ALBUMIN SERPL-MCNC: 4 G/DL (ref 3.5–5)
ALP SERPL-CCNC: 64 U/L (ref 45–120)
ALT SERPL W P-5'-P-CCNC: 24 U/L (ref 0–45)
ANION GAP SERPL CALCULATED.3IONS-SCNC: 9 MMOL/L (ref 5–18)
AST SERPL W P-5'-P-CCNC: 25 U/L (ref 0–40)
BILIRUB SERPL-MCNC: 0.8 MG/DL (ref 0–1)
BUN SERPL-MCNC: 22 MG/DL (ref 8–28)
CALCIUM SERPL-MCNC: 9.8 MG/DL (ref 8.5–10.5)
CHLORIDE BLD-SCNC: 107 MMOL/L (ref 98–107)
CHOLEST SERPL-MCNC: 179 MG/DL
CO2 SERPL-SCNC: 25 MMOL/L (ref 22–31)
CREAT SERPL-MCNC: 0.91 MG/DL (ref 0.7–1.3)
ERYTHROCYTE [DISTWIDTH] IN BLOOD BY AUTOMATED COUNT: 13.1 % (ref 11–14.5)
FASTING STATUS PATIENT QL REPORTED: YES
GFR SERPL CREATININE-BSD FRML MDRD: >60 ML/MIN/1.73M2
GLUCOSE BLD-MCNC: 83 MG/DL (ref 70–125)
HCT VFR BLD AUTO: 44.8 % (ref 40–54)
HDLC SERPL-MCNC: 56 MG/DL
HGB BLD-MCNC: 15.3 G/DL (ref 14–18)
LDLC SERPL CALC-MCNC: 111 MG/DL
MCH RBC QN AUTO: 30.6 PG (ref 27–34)
MCHC RBC AUTO-ENTMCNC: 34.2 G/DL (ref 32–36)
MCV RBC AUTO: 89 FL (ref 80–100)
PLATELET # BLD AUTO: 150 THOU/UL (ref 140–440)
PMV BLD AUTO: 7 FL (ref 7–10)
POTASSIUM BLD-SCNC: 4 MMOL/L (ref 3.5–5)
PROT SERPL-MCNC: 6.7 G/DL (ref 6–8)
RBC # BLD AUTO: 5.01 MILL/UL (ref 4.4–6.2)
SODIUM SERPL-SCNC: 141 MMOL/L (ref 136–145)
TRIGL SERPL-MCNC: 62 MG/DL
TSH SERPL DL<=0.005 MIU/L-ACNC: 7.17 UIU/ML (ref 0.3–5)
WBC: 4.8 THOU/UL (ref 4–11)

## 2018-05-30 ASSESSMENT — MIFFLIN-ST. JEOR: SCORE: 1802.93

## 2018-05-31 ENCOUNTER — COMMUNICATION - HEALTHEAST (OUTPATIENT)
Dept: INTERNAL MEDICINE | Facility: CLINIC | Age: 78
End: 2018-05-31

## 2018-07-02 ENCOUNTER — OFFICE VISIT - HEALTHEAST (OUTPATIENT)
Dept: PODIATRY | Facility: CLINIC | Age: 78
End: 2018-07-02

## 2018-07-02 DIAGNOSIS — L57.0 KERATOMA: ICD-10-CM

## 2018-07-02 DIAGNOSIS — L60.2 ONYCHAUXIS: ICD-10-CM

## 2018-07-02 DIAGNOSIS — M21.6X9 PLANTAR FLEXED METATARSAL, UNSPECIFIED LATERALITY: ICD-10-CM

## 2018-07-02 DIAGNOSIS — B35.1 NAIL FUNGUS: ICD-10-CM

## 2018-07-09 ENCOUNTER — AMBULATORY - HEALTHEAST (OUTPATIENT)
Dept: PODIATRY | Facility: CLINIC | Age: 78
End: 2018-07-09

## 2018-07-09 ENCOUNTER — COMMUNICATION - HEALTHEAST (OUTPATIENT)
Dept: ADMINISTRATIVE | Facility: CLINIC | Age: 78
End: 2018-07-09

## 2018-07-09 DIAGNOSIS — M77.40 METATARSALGIA, UNSPECIFIED LATERALITY: ICD-10-CM

## 2018-07-16 ENCOUNTER — COMMUNICATION - HEALTHEAST (OUTPATIENT)
Dept: INTERNAL MEDICINE | Facility: CLINIC | Age: 78
End: 2018-07-16

## 2018-10-02 ENCOUNTER — AMBULATORY - HEALTHEAST (OUTPATIENT)
Dept: NURSING | Facility: CLINIC | Age: 78
End: 2018-10-02

## 2018-10-02 DIAGNOSIS — Z23 FLU VACCINE NEED: ICD-10-CM

## 2018-11-01 ENCOUNTER — OFFICE VISIT - HEALTHEAST (OUTPATIENT)
Dept: INTERNAL MEDICINE | Facility: CLINIC | Age: 78
End: 2018-11-01

## 2018-11-01 ENCOUNTER — RECORDS - HEALTHEAST (OUTPATIENT)
Dept: ADMINISTRATIVE | Facility: OTHER | Age: 78
End: 2018-11-01

## 2018-11-01 DIAGNOSIS — G47.30 SLEEP APNEA: ICD-10-CM

## 2018-11-01 DIAGNOSIS — R79.89 ELEVATED TSH: ICD-10-CM

## 2018-11-01 DIAGNOSIS — G47.00 INSOMNIA: ICD-10-CM

## 2018-11-01 DIAGNOSIS — E78.5 HYPERLIPEMIA: ICD-10-CM

## 2018-11-01 DIAGNOSIS — E55.9 VITAMIN D DEFICIENCY: ICD-10-CM

## 2018-11-01 DIAGNOSIS — F32.A DEPRESSION: ICD-10-CM

## 2018-11-01 LAB
ALBUMIN SERPL-MCNC: 4.2 G/DL (ref 3.5–5)
ALP SERPL-CCNC: 62 U/L (ref 45–120)
ALT SERPL W P-5'-P-CCNC: 29 U/L (ref 0–45)
ANION GAP SERPL CALCULATED.3IONS-SCNC: 12 MMOL/L (ref 5–18)
AST SERPL W P-5'-P-CCNC: 30 U/L (ref 0–40)
BILIRUB SERPL-MCNC: 1 MG/DL (ref 0–1)
BUN SERPL-MCNC: 21 MG/DL (ref 8–28)
CALCIUM SERPL-MCNC: 10 MG/DL (ref 8.5–10.5)
CHLORIDE BLD-SCNC: 107 MMOL/L (ref 98–107)
CO2 SERPL-SCNC: 24 MMOL/L (ref 22–31)
CREAT SERPL-MCNC: 0.9 MG/DL (ref 0.7–1.3)
ERYTHROCYTE [DISTWIDTH] IN BLOOD BY AUTOMATED COUNT: 12.9 % (ref 11–14.5)
GFR SERPL CREATININE-BSD FRML MDRD: >60 ML/MIN/1.73M2
GLUCOSE BLD-MCNC: 85 MG/DL (ref 70–125)
HCT VFR BLD AUTO: 45.5 % (ref 40–54)
HGB BLD-MCNC: 15.3 G/DL (ref 14–18)
MCH RBC QN AUTO: 30.2 PG (ref 27–34)
MCHC RBC AUTO-ENTMCNC: 33.5 G/DL (ref 32–36)
MCV RBC AUTO: 90 FL (ref 80–100)
PLATELET # BLD AUTO: 141 THOU/UL (ref 140–440)
PMV BLD AUTO: 7.2 FL (ref 7–10)
POTASSIUM BLD-SCNC: 4.6 MMOL/L (ref 3.5–5)
PROT SERPL-MCNC: 6.8 G/DL (ref 6–8)
RBC # BLD AUTO: 5.06 MILL/UL (ref 4.4–6.2)
SODIUM SERPL-SCNC: 143 MMOL/L (ref 136–145)
TSH SERPL DL<=0.005 MIU/L-ACNC: 4.06 UIU/ML (ref 0.3–5)
WBC: 4.7 THOU/UL (ref 4–11)

## 2018-11-02 LAB — 25(OH)D3 SERPL-MCNC: 28.9 NG/ML (ref 30–80)

## 2018-11-03 ENCOUNTER — COMMUNICATION - HEALTHEAST (OUTPATIENT)
Dept: INTERNAL MEDICINE | Facility: CLINIC | Age: 78
End: 2018-11-03

## 2018-11-05 ENCOUNTER — OFFICE VISIT - HEALTHEAST (OUTPATIENT)
Dept: PODIATRY | Facility: CLINIC | Age: 78
End: 2018-11-05

## 2018-11-05 DIAGNOSIS — L57.0 KERATOMA: ICD-10-CM

## 2018-11-05 DIAGNOSIS — L60.2 ONYCHAUXIS: ICD-10-CM

## 2018-11-05 DIAGNOSIS — B35.1 NAIL FUNGUS: ICD-10-CM

## 2018-11-05 ASSESSMENT — MIFFLIN-ST. JEOR: SCORE: 1778.55

## 2018-12-05 ENCOUNTER — OFFICE VISIT - HEALTHEAST (OUTPATIENT)
Dept: INTERNAL MEDICINE | Facility: CLINIC | Age: 78
End: 2018-12-05

## 2018-12-05 ENCOUNTER — HOSPITAL ENCOUNTER (OUTPATIENT)
Dept: CT IMAGING | Facility: HOSPITAL | Age: 78
Discharge: HOME | End: 2018-12-05
Attending: INTERNAL MEDICINE

## 2018-12-05 ENCOUNTER — COMMUNICATION - HEALTHEAST (OUTPATIENT)
Dept: INTERNAL MEDICINE | Facility: CLINIC | Age: 78
End: 2018-12-05

## 2018-12-05 DIAGNOSIS — R55 NEAR SYNCOPE: ICD-10-CM

## 2018-12-05 DIAGNOSIS — N40.1 BENIGN PROSTATIC HYPERPLASIA WITH URINARY FREQUENCY: ICD-10-CM

## 2018-12-05 DIAGNOSIS — G47.00 INSOMNIA: ICD-10-CM

## 2018-12-05 DIAGNOSIS — R35.0 BENIGN PROSTATIC HYPERPLASIA WITH URINARY FREQUENCY: ICD-10-CM

## 2018-12-05 LAB
ALBUMIN UR-MCNC: NEGATIVE MG/DL
ANION GAP SERPL CALCULATED.3IONS-SCNC: 10 MMOL/L (ref 5–18)
APPEARANCE UR: CLEAR
BILIRUB UR QL STRIP: NEGATIVE
BUN SERPL-MCNC: 17 MG/DL (ref 8–28)
CALCIUM SERPL-MCNC: 9.9 MG/DL (ref 8.5–10.5)
CHLORIDE BLD-SCNC: 108 MMOL/L (ref 98–107)
CO2 SERPL-SCNC: 26 MMOL/L (ref 22–31)
COLOR UR AUTO: YELLOW
CREAT BLD-MCNC: 0.9 MG/DL
CREAT SERPL-MCNC: 0.84 MG/DL (ref 0.7–1.3)
ERYTHROCYTE [DISTWIDTH] IN BLOOD BY AUTOMATED COUNT: 12.8 % (ref 11–14.5)
GFR SERPL CREATININE-BSD FRML MDRD: >60 ML/MIN/1.73M2
GLUCOSE BLD-MCNC: 90 MG/DL (ref 70–125)
GLUCOSE UR STRIP-MCNC: NEGATIVE MG/DL
HCT VFR BLD AUTO: 44.1 % (ref 40–54)
HGB BLD-MCNC: 15 G/DL (ref 14–18)
HGB UR QL STRIP: NEGATIVE
KETONES UR STRIP-MCNC: NEGATIVE MG/DL
LEUKOCYTE ESTERASE UR QL STRIP: NEGATIVE
MCH RBC QN AUTO: 30.7 PG (ref 27–34)
MCHC RBC AUTO-ENTMCNC: 33.9 G/DL (ref 32–36)
MCV RBC AUTO: 91 FL (ref 80–100)
NITRATE UR QL: NEGATIVE
PH UR STRIP: 6.5 [PH] (ref 5–8)
PLATELET # BLD AUTO: 164 THOU/UL (ref 140–440)
PMV BLD AUTO: 7.4 FL (ref 7–10)
POC GFR AMER AF HE - HISTORICAL: >60 ML/MIN/1.73M2
POC GFR NON AMER AF HE - HISTORICAL: >60 ML/MIN/1.73M2
POTASSIUM BLD-SCNC: 4.9 MMOL/L (ref 3.5–5)
RBC # BLD AUTO: 4.88 MILL/UL (ref 4.4–6.2)
SODIUM SERPL-SCNC: 144 MMOL/L (ref 136–145)
SP GR UR STRIP: 1.02 (ref 1–1.03)
UROBILINOGEN UR STRIP-ACNC: NORMAL
WBC: 4.2 THOU/UL (ref 4–11)

## 2018-12-06 ENCOUNTER — COMMUNICATION - HEALTHEAST (OUTPATIENT)
Dept: INTERNAL MEDICINE | Facility: CLINIC | Age: 78
End: 2018-12-06

## 2018-12-10 ENCOUNTER — HOSPITAL ENCOUNTER (OUTPATIENT)
Dept: CARDIOLOGY | Facility: HOSPITAL | Age: 78
Discharge: HOME | End: 2018-12-10
Attending: INTERNAL MEDICINE

## 2018-12-10 DIAGNOSIS — R55 NEAR SYNCOPE: ICD-10-CM

## 2018-12-12 ENCOUNTER — COMMUNICATION - HEALTHEAST (OUTPATIENT)
Dept: INTERNAL MEDICINE | Facility: CLINIC | Age: 78
End: 2018-12-12

## 2018-12-13 ENCOUNTER — OFFICE VISIT - HEALTHEAST (OUTPATIENT)
Dept: INTERNAL MEDICINE | Facility: CLINIC | Age: 78
End: 2018-12-13

## 2018-12-13 DIAGNOSIS — G47.00 INSOMNIA: ICD-10-CM

## 2018-12-13 DIAGNOSIS — F33.42 MAJOR DEPRESSIVE DISORDER, RECURRENT EPISODE, IN FULL REMISSION (H): ICD-10-CM

## 2018-12-13 DIAGNOSIS — E78.49 OTHER HYPERLIPIDEMIA: ICD-10-CM

## 2018-12-13 DIAGNOSIS — Z79.899 CONTROLLED SUBSTANCE AGREEMENT SIGNED: ICD-10-CM

## 2018-12-13 DIAGNOSIS — R42 DIZZY SPELLS: ICD-10-CM

## 2018-12-13 DIAGNOSIS — G47.00 INSOMNIA, UNSPECIFIED TYPE: ICD-10-CM

## 2018-12-13 ASSESSMENT — MIFFLIN-ST. JEOR: SCORE: 1788.53

## 2019-02-19 ENCOUNTER — COMMUNICATION - HEALTHEAST (OUTPATIENT)
Dept: INTERNAL MEDICINE | Facility: CLINIC | Age: 79
End: 2019-02-19

## 2019-02-27 ENCOUNTER — OFFICE VISIT - HEALTHEAST (OUTPATIENT)
Dept: INTERNAL MEDICINE | Facility: CLINIC | Age: 79
End: 2019-02-27

## 2019-02-27 DIAGNOSIS — G47.00 INSOMNIA, UNSPECIFIED TYPE: ICD-10-CM

## 2019-02-27 DIAGNOSIS — F33.42 MAJOR DEPRESSIVE DISORDER, RECURRENT EPISODE, IN FULL REMISSION (H): ICD-10-CM

## 2019-02-27 DIAGNOSIS — G47.33 OSA ON CPAP: ICD-10-CM

## 2019-02-27 DIAGNOSIS — E66.01 MORBID OBESITY (H): ICD-10-CM

## 2019-05-23 ENCOUNTER — COMMUNICATION - HEALTHEAST (OUTPATIENT)
Dept: INTERNAL MEDICINE | Facility: CLINIC | Age: 79
End: 2019-05-23

## 2019-05-23 DIAGNOSIS — G47.00 INSOMNIA: ICD-10-CM

## 2019-05-29 ENCOUNTER — COMMUNICATION - HEALTHEAST (OUTPATIENT)
Dept: INTERNAL MEDICINE | Facility: CLINIC | Age: 79
End: 2019-05-29

## 2019-05-29 DIAGNOSIS — G47.00 INSOMNIA: ICD-10-CM

## 2019-05-30 ENCOUNTER — COMMUNICATION - HEALTHEAST (OUTPATIENT)
Dept: INTERNAL MEDICINE | Facility: CLINIC | Age: 79
End: 2019-05-30

## 2019-06-10 ENCOUNTER — COMMUNICATION - HEALTHEAST (OUTPATIENT)
Dept: INTERNAL MEDICINE | Facility: CLINIC | Age: 79
End: 2019-06-10

## 2019-06-10 DIAGNOSIS — F32.A DEPRESSION: ICD-10-CM

## 2019-08-02 ENCOUNTER — COMMUNICATION - HEALTHEAST (OUTPATIENT)
Dept: INTERNAL MEDICINE | Facility: CLINIC | Age: 79
End: 2019-08-02

## 2019-08-02 DIAGNOSIS — G47.00 INSOMNIA: ICD-10-CM

## 2019-08-27 ENCOUNTER — OFFICE VISIT - HEALTHEAST (OUTPATIENT)
Dept: INTERNAL MEDICINE | Facility: CLINIC | Age: 79
End: 2019-08-27

## 2019-08-27 DIAGNOSIS — G47.33 OSA ON CPAP: ICD-10-CM

## 2019-08-27 DIAGNOSIS — R35.0 URINARY FREQUENCY: ICD-10-CM

## 2019-08-27 DIAGNOSIS — G47.00 INSOMNIA: ICD-10-CM

## 2019-08-27 DIAGNOSIS — Z13.220 SCREENING FOR HYPERLIPIDEMIA: ICD-10-CM

## 2019-08-27 DIAGNOSIS — F33.42 MAJOR DEPRESSIVE DISORDER, RECURRENT EPISODE, IN FULL REMISSION (H): ICD-10-CM

## 2019-08-27 DIAGNOSIS — E66.01 MORBID OBESITY (H): ICD-10-CM

## 2019-08-27 DIAGNOSIS — Z13.1 SCREENING FOR DIABETES MELLITUS: ICD-10-CM

## 2019-08-27 ASSESSMENT — MIFFLIN-ST. JEOR: SCORE: 1769.48

## 2019-08-28 ENCOUNTER — COMMUNICATION - HEALTHEAST (OUTPATIENT)
Dept: INTERNAL MEDICINE | Facility: CLINIC | Age: 79
End: 2019-08-28

## 2019-08-28 LAB
CHOLEST SERPL-MCNC: 179 MG/DL
FASTING STATUS PATIENT QL REPORTED: YES
FASTING STATUS PATIENT QL REPORTED: YES
GLUCOSE BLD-MCNC: 86 MG/DL (ref 70–125)
HDLC SERPL-MCNC: 56 MG/DL
LDLC SERPL CALC-MCNC: 109 MG/DL
TRIGL SERPL-MCNC: 69 MG/DL

## 2019-09-04 ENCOUNTER — COMMUNICATION - HEALTHEAST (OUTPATIENT)
Dept: INTERNAL MEDICINE | Facility: CLINIC | Age: 79
End: 2019-09-04

## 2019-09-10 ENCOUNTER — COMMUNICATION - HEALTHEAST (OUTPATIENT)
Dept: INTERNAL MEDICINE | Facility: CLINIC | Age: 79
End: 2019-09-10

## 2019-09-30 ENCOUNTER — AMBULATORY - HEALTHEAST (OUTPATIENT)
Dept: NURSING | Facility: CLINIC | Age: 79
End: 2019-09-30

## 2019-09-30 DIAGNOSIS — Z23 FLU VACCINE NEED: ICD-10-CM

## 2019-10-22 ENCOUNTER — COMMUNICATION - HEALTHEAST (OUTPATIENT)
Dept: INTERNAL MEDICINE | Facility: CLINIC | Age: 79
End: 2019-10-22

## 2019-10-23 ENCOUNTER — COMMUNICATION - HEALTHEAST (OUTPATIENT)
Dept: INTERNAL MEDICINE | Facility: CLINIC | Age: 79
End: 2019-10-23

## 2019-10-24 ENCOUNTER — OFFICE VISIT - HEALTHEAST (OUTPATIENT)
Dept: INTERNAL MEDICINE | Facility: CLINIC | Age: 79
End: 2019-10-24

## 2019-10-24 DIAGNOSIS — J01.90 ACUTE SINUSITIS WITH SYMPTOMS GREATER THAN 10 DAYS: ICD-10-CM

## 2019-10-24 DIAGNOSIS — F32.A DEPRESSION: ICD-10-CM

## 2019-10-24 DIAGNOSIS — G47.00 INSOMNIA, UNSPECIFIED TYPE: ICD-10-CM

## 2019-10-24 DIAGNOSIS — G47.33 OSA ON CPAP: ICD-10-CM

## 2019-10-24 ASSESSMENT — MIFFLIN-ST. JEOR: SCORE: 1774.01

## 2019-10-25 ENCOUNTER — COMMUNICATION - HEALTHEAST (OUTPATIENT)
Dept: INTERNAL MEDICINE | Facility: CLINIC | Age: 79
End: 2019-10-25

## 2019-11-06 ENCOUNTER — RECORDS - HEALTHEAST (OUTPATIENT)
Dept: ADMINISTRATIVE | Facility: OTHER | Age: 79
End: 2019-11-06

## 2019-11-19 ENCOUNTER — COMMUNICATION - HEALTHEAST (OUTPATIENT)
Dept: INTERNAL MEDICINE | Facility: CLINIC | Age: 79
End: 2019-11-19

## 2019-11-22 ENCOUNTER — OFFICE VISIT - HEALTHEAST (OUTPATIENT)
Dept: INTERNAL MEDICINE | Facility: CLINIC | Age: 79
End: 2019-11-22

## 2019-11-22 DIAGNOSIS — G47.33 OSA ON CPAP: ICD-10-CM

## 2019-11-22 DIAGNOSIS — F33.42 MAJOR DEPRESSIVE DISORDER, RECURRENT EPISODE, IN FULL REMISSION (H): ICD-10-CM

## 2019-11-22 DIAGNOSIS — E66.01 MORBID OBESITY (H): ICD-10-CM

## 2019-11-22 ASSESSMENT — MIFFLIN-ST. JEOR: SCORE: 1764.94

## 2020-02-10 ENCOUNTER — COMMUNICATION - HEALTHEAST (OUTPATIENT)
Dept: INTERNAL MEDICINE | Facility: CLINIC | Age: 80
End: 2020-02-10

## 2020-02-10 DIAGNOSIS — G47.33 OSA ON CPAP: ICD-10-CM

## 2020-03-17 ENCOUNTER — COMMUNICATION - HEALTHEAST (OUTPATIENT)
Dept: INTERNAL MEDICINE | Facility: CLINIC | Age: 80
End: 2020-03-17

## 2020-04-09 ENCOUNTER — COMMUNICATION - HEALTHEAST (OUTPATIENT)
Dept: OTOLARYNGOLOGY | Facility: CLINIC | Age: 80
End: 2020-04-09

## 2020-04-22 ENCOUNTER — COMMUNICATION - HEALTHEAST (OUTPATIENT)
Dept: TELEHEALTH | Facility: CLINIC | Age: 80
End: 2020-04-22

## 2020-04-22 ENCOUNTER — OFFICE VISIT - HEALTHEAST (OUTPATIENT)
Dept: OTOLARYNGOLOGY | Facility: CLINIC | Age: 80
End: 2020-04-22

## 2020-04-22 DIAGNOSIS — H61.23 BILATERAL IMPACTED CERUMEN: ICD-10-CM

## 2020-05-27 ENCOUNTER — COMMUNICATION - HEALTHEAST (OUTPATIENT)
Dept: INTERNAL MEDICINE | Facility: CLINIC | Age: 80
End: 2020-05-27

## 2020-05-27 ENCOUNTER — AMBULATORY - HEALTHEAST (OUTPATIENT)
Dept: INTERNAL MEDICINE | Facility: CLINIC | Age: 80
End: 2020-05-27

## 2020-05-27 DIAGNOSIS — D12.2 ADENOMATOUS POLYP OF ASCENDING COLON: ICD-10-CM

## 2020-05-27 DIAGNOSIS — Z80.0 FAMILY HISTORY OF COLON CANCER: ICD-10-CM

## 2020-05-27 DIAGNOSIS — D36.9: ICD-10-CM

## 2020-06-04 ENCOUNTER — TELEPHONE (OUTPATIENT)
Dept: SURGERY | Facility: CLINIC | Age: 80
End: 2020-06-04

## 2020-06-04 DIAGNOSIS — Z80.0 FAMILY HISTORY OF COLON CANCER: Primary | ICD-10-CM

## 2020-06-04 NOTE — TELEPHONE ENCOUNTER
Order for colonoscopy placed.    Called Pt back to let him know that order has been placed and he can call to schedule his colonoscopy. Discussed to let the  know that he would like to be scheduled with Dr. Mancia.    Juan Daniel Hill LPN

## 2020-06-04 NOTE — TELEPHONE ENCOUNTER
[facility] Call Center    Phone Message    May a detailed message be left on voicemail: yes     Reason for Call: Other: Patient called to schedule his 3 year follow up colonoscopy; however, he needs an order for the procedure with Dr. Mancia.  Please have Dr. Mancia put in order and then call patient so that he can schedule the proedure.  Thank you!     Action Taken: Message routed to:  [facility] ([facility]    Travel Screening: Not Applicable

## 2020-07-17 ENCOUNTER — TELEPHONE (OUTPATIENT)
Dept: GASTROENTEROLOGY | Facility: CLINIC | Age: 80
End: 2020-07-17

## 2020-07-17 DIAGNOSIS — Z11.59 ENCOUNTER FOR SCREENING FOR OTHER VIRAL DISEASES: Primary | ICD-10-CM

## 2020-07-21 ENCOUNTER — OFFICE VISIT - HEALTHEAST (OUTPATIENT)
Dept: INTERNAL MEDICINE | Facility: CLINIC | Age: 80
End: 2020-07-21

## 2020-07-21 ENCOUNTER — COMMUNICATION - HEALTHEAST (OUTPATIENT)
Dept: INTERNAL MEDICINE | Facility: CLINIC | Age: 80
End: 2020-07-21

## 2020-07-21 DIAGNOSIS — G47.00 INSOMNIA, UNSPECIFIED TYPE: ICD-10-CM

## 2020-07-21 DIAGNOSIS — Z13.220 SCREENING FOR HYPERLIPIDEMIA: ICD-10-CM

## 2020-07-21 DIAGNOSIS — F33.42 MAJOR DEPRESSIVE DISORDER, RECURRENT EPISODE, IN FULL REMISSION (H): ICD-10-CM

## 2020-07-21 DIAGNOSIS — Z71.89 ADVANCED CARE PLANNING/COUNSELING DISCUSSION: ICD-10-CM

## 2020-07-21 DIAGNOSIS — G47.33 OSA ON CPAP: ICD-10-CM

## 2020-07-21 DIAGNOSIS — Z00.00 ROUTINE GENERAL MEDICAL EXAMINATION AT A HEALTH CARE FACILITY: ICD-10-CM

## 2020-07-21 DIAGNOSIS — E66.01 MORBID OBESITY (H): ICD-10-CM

## 2020-07-21 DIAGNOSIS — K21.9 GASTROESOPHAGEAL REFLUX DISEASE WITHOUT ESOPHAGITIS: ICD-10-CM

## 2020-07-21 DIAGNOSIS — I83.813 VARICOSE VEINS OF BOTH LOWER EXTREMITIES WITH PAIN: ICD-10-CM

## 2020-07-21 LAB
ANION GAP SERPL CALCULATED.3IONS-SCNC: 8 MMOL/L (ref 5–18)
ATRIAL RATE - MUSE: 56 BPM
BUN SERPL-MCNC: 17 MG/DL (ref 8–28)
CALCIUM SERPL-MCNC: 9.5 MG/DL (ref 8.5–10.5)
CHLORIDE BLD-SCNC: 106 MMOL/L (ref 98–107)
CHOLEST SERPL-MCNC: 194 MG/DL
CO2 SERPL-SCNC: 26 MMOL/L (ref 22–31)
CREAT SERPL-MCNC: 0.91 MG/DL (ref 0.7–1.3)
DIASTOLIC BLOOD PRESSURE - MUSE: NORMAL
ERYTHROCYTE [DISTWIDTH] IN BLOOD BY AUTOMATED COUNT: 13 % (ref 11–14.5)
FASTING STATUS PATIENT QL REPORTED: YES
GFR SERPL CREATININE-BSD FRML MDRD: >60 ML/MIN/1.73M2
GLUCOSE BLD-MCNC: 83 MG/DL (ref 70–125)
HCT VFR BLD AUTO: 43.6 % (ref 40–54)
HDLC SERPL-MCNC: 59 MG/DL
HGB BLD-MCNC: 14.7 G/DL (ref 14–18)
INTERPRETATION ECG - MUSE: NORMAL
LDLC SERPL CALC-MCNC: 122 MG/DL
MCH RBC QN AUTO: 31.5 PG (ref 27–34)
MCHC RBC AUTO-ENTMCNC: 33.8 G/DL (ref 32–36)
MCV RBC AUTO: 93 FL (ref 80–100)
P AXIS - MUSE: 34 DEGREES
PLATELET # BLD AUTO: 151 THOU/UL (ref 140–440)
PMV BLD AUTO: 6.9 FL (ref 7–10)
POTASSIUM BLD-SCNC: 3.9 MMOL/L (ref 3.5–5)
PR INTERVAL - MUSE: 348 MS
QRS DURATION - MUSE: 96 MS
QT - MUSE: 424 MS
QTC - MUSE: 409 MS
R AXIS - MUSE: -22 DEGREES
RBC # BLD AUTO: 4.68 MILL/UL (ref 4.4–6.2)
SODIUM SERPL-SCNC: 140 MMOL/L (ref 136–145)
SYSTOLIC BLOOD PRESSURE - MUSE: NORMAL
T AXIS - MUSE: 6 DEGREES
TRIGL SERPL-MCNC: 66 MG/DL
VENTRICULAR RATE- MUSE: 56 BPM
WBC: 4.7 THOU/UL (ref 4–11)

## 2020-07-21 ASSESSMENT — PATIENT HEALTH QUESTIONNAIRE - PHQ9: SUM OF ALL RESPONSES TO PHQ QUESTIONS 1-9: 0

## 2020-07-21 ASSESSMENT — MIFFLIN-ST. JEOR: SCORE: 1769.48

## 2020-07-22 ENCOUNTER — COMMUNICATION - HEALTHEAST (OUTPATIENT)
Dept: OTHER | Facility: CLINIC | Age: 80
End: 2020-07-22

## 2020-07-28 ENCOUNTER — COMMUNICATION - HEALTHEAST (OUTPATIENT)
Dept: OTHER | Facility: CLINIC | Age: 80
End: 2020-07-28

## 2020-07-29 ENCOUNTER — OFFICE VISIT - HEALTHEAST (OUTPATIENT)
Dept: VASCULAR SURGERY | Facility: CLINIC | Age: 80
End: 2020-07-29

## 2020-07-29 DIAGNOSIS — I83.893 SYMPTOMATIC VARICOSE VEINS OF BOTH LOWER EXTREMITIES: ICD-10-CM

## 2020-08-10 ENCOUNTER — TELEPHONE (OUTPATIENT)
Dept: GASTROENTEROLOGY | Facility: OUTPATIENT CENTER | Age: 80
End: 2020-08-10

## 2020-08-11 ENCOUNTER — TELEPHONE (OUTPATIENT)
Dept: GASTROENTEROLOGY | Facility: OUTPATIENT CENTER | Age: 80
End: 2020-08-11

## 2020-08-11 DIAGNOSIS — Z12.11 ENCOUNTER FOR SCREENING COLONOSCOPY: Primary | ICD-10-CM

## 2020-08-11 NOTE — TELEPHONE ENCOUNTER
Patient taking any blood thinners ? No      Heart disease ? Denies      Lung disease ? Denies      Sleep apnea ? Cpap     Diabetic ? Denies      Kidney disease ?Denies      Electronic implanted medical devices ? Denies           PTSD ? N/a      Prep instructions reviewed with patient ? Patient declined review.  policy, MAC sedation plan reviewed. Instructed patient to have someone stay with him post exam     Yes    Pharmacy : N/a     Indication for procedure : Family history of colon cancer     Referring provider : Self      Arrival Time : 9:30 AM

## 2020-08-14 DIAGNOSIS — Z11.59 ENCOUNTER FOR SCREENING FOR OTHER VIRAL DISEASES: ICD-10-CM

## 2020-08-14 PROCEDURE — U0003 INFECTIOUS AGENT DETECTION BY NUCLEIC ACID (DNA OR RNA); SEVERE ACUTE RESPIRATORY SYNDROME CORONAVIRUS 2 (SARS-COV-2) (CORONAVIRUS DISEASE [COVID-19]), AMPLIFIED PROBE TECHNIQUE, MAKING USE OF HIGH THROUGHPUT TECHNOLOGIES AS DESCRIBED BY CMS-2020-01-R: HCPCS | Performed by: COLON & RECTAL SURGERY

## 2020-08-15 LAB
SARS-COV-2 RNA SPEC QL NAA+PROBE: NOT DETECTED
SPECIMEN SOURCE: NORMAL

## 2020-08-17 ENCOUNTER — TRANSFERRED RECORDS (OUTPATIENT)
Dept: HEALTH INFORMATION MANAGEMENT | Facility: CLINIC | Age: 80
End: 2020-08-17

## 2020-08-17 ENCOUNTER — DOCUMENTATION ONLY (OUTPATIENT)
Dept: GASTROENTEROLOGY | Facility: OUTPATIENT CENTER | Age: 80
End: 2020-08-17
Attending: COLON & RECTAL SURGERY
Payer: MEDICARE

## 2020-08-17 DIAGNOSIS — Z80.0 FAMILY HISTORY OF COLON CANCER: ICD-10-CM

## 2020-08-18 LAB — COPATH REPORT: NORMAL

## 2020-09-02 ENCOUNTER — COMMUNICATION - HEALTHEAST (OUTPATIENT)
Dept: SCHEDULING | Facility: CLINIC | Age: 80
End: 2020-09-02

## 2020-09-03 ENCOUNTER — COMMUNICATION - HEALTHEAST (OUTPATIENT)
Dept: INTERNAL MEDICINE | Facility: CLINIC | Age: 80
End: 2020-09-03

## 2020-09-03 ENCOUNTER — OFFICE VISIT - HEALTHEAST (OUTPATIENT)
Dept: INTERNAL MEDICINE | Facility: CLINIC | Age: 80
End: 2020-09-03

## 2020-09-03 ENCOUNTER — RECORDS - HEALTHEAST (OUTPATIENT)
Dept: GENERAL RADIOLOGY | Facility: CLINIC | Age: 80
End: 2020-09-03

## 2020-09-03 DIAGNOSIS — N50.811 RIGHT TESTICULAR PAIN: ICD-10-CM

## 2020-09-03 DIAGNOSIS — R10.31 RIGHT LOWER QUADRANT PAIN: ICD-10-CM

## 2020-09-03 DIAGNOSIS — M54.50 LOW BACK PAIN: ICD-10-CM

## 2020-09-03 DIAGNOSIS — M54.50 RIGHT-SIDED LOW BACK PAIN WITHOUT SCIATICA, UNSPECIFIED CHRONICITY: ICD-10-CM

## 2020-09-03 DIAGNOSIS — R10.31 RIGHT GROIN PAIN: ICD-10-CM

## 2020-09-03 DIAGNOSIS — F32.A DEPRESSION: ICD-10-CM

## 2020-09-03 LAB
ALBUMIN UR-MCNC: NEGATIVE MG/DL
APPEARANCE UR: CLEAR
BILIRUB UR QL STRIP: NEGATIVE
C REACTIVE PROTEIN LHE: 0.3 MG/DL (ref 0–0.8)
COLOR UR AUTO: YELLOW
GLUCOSE UR STRIP-MCNC: NEGATIVE MG/DL
HGB UR QL STRIP: NEGATIVE
KETONES UR STRIP-MCNC: NEGATIVE MG/DL
LEUKOCYTE ESTERASE UR QL STRIP: NEGATIVE
NITRATE UR QL: NEGATIVE
PH UR STRIP: 5.5 [PH] (ref 5–8)
SP GR UR STRIP: 1.02 (ref 1–1.03)
UROBILINOGEN UR STRIP-ACNC: NORMAL
WBC: 4.6 THOU/UL (ref 4–11)

## 2020-09-03 ASSESSMENT — MIFFLIN-ST. JEOR: SCORE: 1774.01

## 2020-09-04 ENCOUNTER — COMMUNICATION - HEALTHEAST (OUTPATIENT)
Dept: INTERNAL MEDICINE | Facility: CLINIC | Age: 80
End: 2020-09-04

## 2020-10-27 ENCOUNTER — RECORDS - HEALTHEAST (OUTPATIENT)
Dept: VASCULAR ULTRASOUND | Facility: CLINIC | Age: 80
End: 2020-10-27

## 2020-10-27 ENCOUNTER — OFFICE VISIT - HEALTHEAST (OUTPATIENT)
Dept: VASCULAR SURGERY | Facility: CLINIC | Age: 80
End: 2020-10-27

## 2020-10-27 DIAGNOSIS — I83.892 SYMPTOMATIC VARICOSE VEINS, LEFT: ICD-10-CM

## 2020-10-27 DIAGNOSIS — I83.893 VARICOSE VEINS OF BILATERAL LOWER EXTREMITIES WITH OTHER COMPLICATIONS: ICD-10-CM

## 2020-11-09 ENCOUNTER — RECORDS - HEALTHEAST (OUTPATIENT)
Dept: ADMINISTRATIVE | Facility: OTHER | Age: 80
End: 2020-11-09

## 2020-11-20 ENCOUNTER — COMMUNICATION - HEALTHEAST (OUTPATIENT)
Dept: VASCULAR SURGERY | Facility: CLINIC | Age: 80
End: 2020-11-20

## 2020-12-29 ENCOUNTER — COMMUNICATION - HEALTHEAST (OUTPATIENT)
Dept: INTERNAL MEDICINE | Facility: CLINIC | Age: 80
End: 2020-12-29

## 2021-02-02 ENCOUNTER — OFFICE VISIT - HEALTHEAST (OUTPATIENT)
Dept: INTERNAL MEDICINE | Facility: CLINIC | Age: 81
End: 2021-02-02

## 2021-02-02 ENCOUNTER — AMBULATORY - HEALTHEAST (OUTPATIENT)
Dept: NURSING | Facility: CLINIC | Age: 81
End: 2021-02-02

## 2021-02-02 DIAGNOSIS — K21.9 GASTROESOPHAGEAL REFLUX DISEASE WITHOUT ESOPHAGITIS: ICD-10-CM

## 2021-02-02 DIAGNOSIS — E66.01 MORBID OBESITY (H): ICD-10-CM

## 2021-02-02 DIAGNOSIS — F33.42 MAJOR DEPRESSIVE DISORDER, RECURRENT EPISODE, IN FULL REMISSION (H): ICD-10-CM

## 2021-02-02 DIAGNOSIS — G47.00 INSOMNIA, UNSPECIFIED TYPE: ICD-10-CM

## 2021-02-02 DIAGNOSIS — B07.0 PLANTAR WARTS: ICD-10-CM

## 2021-02-02 ASSESSMENT — PATIENT HEALTH QUESTIONNAIRE - PHQ9: SUM OF ALL RESPONSES TO PHQ QUESTIONS 1-9: 0

## 2021-02-02 ASSESSMENT — MIFFLIN-ST. JEOR: SCORE: 1733.19

## 2021-02-23 ENCOUNTER — AMBULATORY - HEALTHEAST (OUTPATIENT)
Dept: NURSING | Facility: CLINIC | Age: 81
End: 2021-02-23

## 2021-04-12 ENCOUNTER — OFFICE VISIT - HEALTHEAST (OUTPATIENT)
Dept: OTOLARYNGOLOGY | Facility: CLINIC | Age: 81
End: 2021-04-12

## 2021-04-12 DIAGNOSIS — H92.12 OTORRHEA, LEFT: ICD-10-CM

## 2021-05-25 ENCOUNTER — RECORDS - HEALTHEAST (OUTPATIENT)
Dept: ADMINISTRATIVE | Facility: CLINIC | Age: 81
End: 2021-05-25

## 2021-05-26 ENCOUNTER — RECORDS - HEALTHEAST (OUTPATIENT)
Dept: ADMINISTRATIVE | Facility: CLINIC | Age: 81
End: 2021-05-26

## 2021-05-26 VITALS — SYSTOLIC BLOOD PRESSURE: 122 MMHG | HEART RATE: 60 BPM | DIASTOLIC BLOOD PRESSURE: 76 MMHG | TEMPERATURE: 97.6 F

## 2021-05-27 ASSESSMENT — PATIENT HEALTH QUESTIONNAIRE - PHQ9
SUM OF ALL RESPONSES TO PHQ QUESTIONS 1-9: 0
SUM OF ALL RESPONSES TO PHQ QUESTIONS 1-9: 0

## 2021-05-29 ENCOUNTER — RECORDS - HEALTHEAST (OUTPATIENT)
Dept: ADMINISTRATIVE | Facility: CLINIC | Age: 81
End: 2021-05-29

## 2021-05-29 NOTE — TELEPHONE ENCOUNTER
Medication Question or Clarification  Who is calling: Patient  What medication are you calling about? (include dose and sig)   zolpidem (AMBIEN) 5 MG tablet 30 tablet 0 5/23/2019     Sig - Route: Take 0.5 tablets (2.5 mg total) by mouth at bedtime as needed for sleep (minimize use). - Oral    Class: Print      Who prescribed the medication?: Jean Pierre Ann MD  What is your question/concern?: Patient stated [facility] has not received this prescription. It appears this prescription was printed.  Please send this prescription the pharmacy electronically.  Patient stated he only has one day left.  Pharmacy: [facility]  Okay to leave a detailed message?: No  Site CMT - Please call the pharmacy to obtain any additional needed information.

## 2021-05-29 NOTE — TELEPHONE ENCOUNTER
Controlled Substance Refill Request  Medication Name:   Requested Prescriptions     Pending Prescriptions Disp Refills     zolpidem (AMBIEN) 5 MG tablet 30 tablet 0     Sig: Take 0.5 tablets (2.5 mg total) by mouth at bedtime as needed for sleep (minimize use).     Date Last Fill: 12/13/19  Pharmacy: [facility]      Submit electronically to pharmacy  Controlled Substance Agreement Date Scanned:   Encounter-Level CSA Scan Date - 01/19/2017:    Scan on 1/20/2017  2:01 PM: ZOLPIDEM (below)         Last office visit with prescriber/PCP: 2/27/2019 Miguelito Hall MD OR same dept: 12/13/2018 Jean Pierre Ann MD OR same specialty: 2/27/2019 Miguelito Hall MD  Last physical: Visit date not found Last MTM visit: Visit date not found

## 2021-05-29 NOTE — TELEPHONE ENCOUNTER
Refill Approved    Rx renewed per Medication Renewal Policy. Medication was last renewed on 11/1/18.    Yessi Nieves, [facility] Triage/Med Refill 6/11/2019     Requested Prescriptions   Pending Prescriptions Disp Refills     PARoxetine (PAXIL) 20 MG tablet [Pharmacy Med Name: PAROXETINE HCL 20 MG TABLET] 90 tablet 0     Sig: TAKE 1 TABLET BY MOUTH EVERY DAY       SSRI Refill Protocol  Passed - 6/10/2019  9:38 AM        Passed - PCP or prescribing provider visit in last year     Last office visit with prescriber/PCP: 12/13/2018 Jean Pierre Ann MD OR same dept: 12/13/2018 Jean Pierre Ann MD OR same specialty: 2/27/2019 Miguelito Hall MD  Last physical: 5/30/2018 Last MTM visit: Visit date not found   Next visit within 3 mo: Visit date not found  Next physical within 3 mo: Visit date not found  Prescriber OR PCP: Jean Pierre Ann MD  Last diagnosis associated with med order: 1. Depression  - PARoxetine (PAXIL) 20 MG tablet [Pharmacy Med Name: PAROXETINE HCL 20 MG TABLET]; TAKE 1 TABLET BY MOUTH EVERY DAY  Dispense: 90 tablet; Refill: 0    If protocol passes may refill for 12 months if within 3 months of last provider visit (or a total of 15 months).

## 2021-05-30 VITALS — WEIGHT: 220.6 LBS | HEIGHT: 67 IN | BODY MASS INDEX: 34.62 KG/M2

## 2021-05-30 VITALS — WEIGHT: 233.5 LBS | BODY MASS INDEX: 36.65 KG/M2 | HEIGHT: 67 IN

## 2021-05-31 VITALS — WEIGHT: 240.2 LBS | BODY MASS INDEX: 37.62 KG/M2

## 2021-05-31 VITALS — WEIGHT: 237 LBS | BODY MASS INDEX: 37.12 KG/M2

## 2021-05-31 NOTE — PROGRESS NOTES
"  Office Visit - Follow Up   Gagan Nieto   79 y.o. male    Date of Visit: 8/27/2019    Chief Complaint   Patient presents with     Ear Fullness     Urinary Frequency     going on for a long time        Assessment and Plan   1. Urinary frequency  Likely BPH, does not want to try any medication, interested in saw palmetto    2. Insomnia  - zolpidem (AMBIEN) 5 MG tablet; Take 0.5 tablets (2.5 mg total) by mouth at bedtime as needed for sleep (minimize use).  Dispense: 30 tablet; Refill: 3    3. Recurrent Major Depression In Full Remission  Continue paroxetine    4. TERE on CPAP 8cm    5. Screening for hyperlipidemia  - Lipid Cascade    6. Screening for diabetes mellitus  - Glucose    7. Obesity (BMI 35.0-39.9) with comorbidity (H)  The following high BMI interventions were performed this visit: encouragement to exercise and lifestyle education regarding diet    Return in about 6 months (around 2/27/2020) for annual physical.     History of Present Illness   This 79 y.o. old man comes in for follow-up of numerous medical problems and evaluation of urinary frequency.  Overall feeling okay.  He notes that he gets up 1-2 times at night and has to go more frequently during the day.  Describes a slow stream.  He is not interested in medication but would like to try something over-the-counter like a supplement.  He is using CPAP tolerating.  Has some insomnia and uses very low-dose Ambien which is helpful.  Exercising regularly.  Mood stable.    Review of Systems: A comprehensive review of systems was negative except as noted.     Medications, Allergies and Problem List   Reviewed, reconciled and updated  Post Discharge Medication Reconciliation Status:      Physical Exam   General Appearance:   No acute distress    /68 (Patient Site: Left Arm, Patient Position: Sitting, Cuff Size: Adult Regular)   Pulse 74   Ht 5' 7\" (1.702 m)   Wt (!) 246 lb (111.6 kg)   SpO2 97%   BMI 38.53 kg/m      HEENT exam is " unremarkable  Neck supple no thyromegaly or nodule palpable  Lymphatic no cervical lymphadenopathy  Cardiovascular regular rate and rhythm no murmur gallop or rub  Pulmonary lungs are clear to auscultation bilaterally  Gastrointestinal abdomen soft nontender nondistended no organomegaly  Neurologic exam is non focal  Psychiatric pleasant, no confusion or agitation        Additional Information   Current Outpatient Medications   Medication Sig Dispense Refill     amoxicillin (AMOXIL) 500 MG capsule Take 500 mg by mouth as needed Take 4 capsules by mouth one hour prior to dental appoinment as directed..       carboxymethylcellulose (REFRESH LIQUIGEL) 1 % ophthalmic solution Administer 1 drop to both eyes 2 (two) times a day.       cholecalciferol, vitamin D3, (CHOLECALCIFEROL) 1,000 unit tablet Take 5 tablets by mouth daily.       fluticasone (FLONASE) 50 mcg/actuation nasal spray 1 spray into each nostril as needed. 48 g 3     OREGANO OIL ORAL Take by mouth.       PARoxetine (PAXIL) 20 MG tablet TAKE 1 TABLET BY MOUTH EVERY DAY 90 tablet 2     polyethylene glycol (GLYCOLAX) 17 gram/dose powder Take 17 g by mouth as needed.       psyllium (FIBER, PSYLLIUM HUSK,) 0.52 gram capsule Take 0.52 g by mouth 2 (two) times a day.        sodium chloride (OCEAN) 0.65 % nasal spray 1 spray into each nostril as needed for congestion.       zolpidem (AMBIEN) 5 MG tablet Take 0.5 tablets (2.5 mg total) by mouth at bedtime as needed for sleep (minimize use). 30 tablet 3     No current facility-administered medications for this visit.      No Known Allergies  Social History     Tobacco Use     Smoking status: Never Smoker     Smokeless tobacco: Never Used   Substance Use Topics     Alcohol use: No     Drug use: No       Review and/or order of clinical lab tests:  Review and/or order of radiology tests:  Review and/or order of medicine tests:  Discussion of test results with performing physician:  Decision to obtain old records and/or  obtain history from someone other than the patient:  Review and summarization of old records and/or obtaining history from someone other than the patient and.or discussion of case with another health care provider:  Independent visualization of image, tracing or specimen itself:    Time:      Miguelito Hall MD

## 2021-05-31 NOTE — TELEPHONE ENCOUNTER
Controlled Substance Refill Request  Medication Name:   Requested Prescriptions     Pending Prescriptions Disp Refills     zolpidem (AMBIEN) 5 MG tablet 30 tablet 0     Sig: Take 0.5 tablets (2.5 mg total) by mouth at bedtime as needed for sleep (minimize use).     Date Last Fill: 05/29/19  Pharmacy:[id number] - [place], [place] - [address]     Submit electronically to pharmacy  Controlled Substance Agreement Date Scanned:   Encounter-Level CSA Scan Date - 01/19/2017:    Scan on 1/20/2017  2:01 PM: ZOLPIDEM (below)         Last office visit with prescriber/PCP: 2/27/2019 Miguelito Hall MD OR same dept: 2/27/2019 Miguelito Hall MD OR same specialty: 2/27/2019 Miguelito Hall MD  Last physical: Visit date not found Last MTM visit: Visit date not found

## 2021-06-01 VITALS — WEIGHT: 253.38 LBS | HEIGHT: 67 IN | BODY MASS INDEX: 39.77 KG/M2

## 2021-06-01 NOTE — TELEPHONE ENCOUNTER
Who is calling:  patient  Reason for Call:  Patient called regarding an authorization for his medication, he would like to speak with Annita.  Date of last appointment with primary care: 8/27/19  Okay to leave a detailed message: Yes

## 2021-06-01 NOTE — TELEPHONE ENCOUNTER
Central PA team  [phone]  Pool: HE PA MED ([id number])          PA has been initiated.       PA form completed and faxed insurance via Cover My Meds     Key:  [id number]     Medication:  Zolpidem Tartrate 5MG tablets    Insurance:  [employer]        Response will be received via fax and may take up to 5-10 business days depending on plan

## 2021-06-01 NOTE — TELEPHONE ENCOUNTER
Prior Authorization Request  Who s requesting:  Pharmacy  Pharmacy Name and Location: [facility]  Medication Name:   zolpidem (AMBIEN) 5 MG tablet 30 tablet 3 8/27/2019  No   Sig - Route: Take 0.5 tablets (2.5 mg total) by mouth at bedtime as needed for sleep (minimize use). - Oral       Insurance Plan: Medicare  Insurance Member ID Number:  [id number]  Informed patient that prior authorizations can take up to 10 business days for response:   No  Okay to leave a detailed message: Yes

## 2021-06-01 NOTE — TELEPHONE ENCOUNTER
Who is calling:  Patient   Reason for Call:  I need Annita to return my call. No other information found   Date of last appointment with primary care: 2/27/19  Okay to leave a detailed message: Yes

## 2021-06-01 NOTE — TELEPHONE ENCOUNTER
Patient Returning Call  Reason for call:  Patient calling back  Information relayed to patient:  Educated the patient to the PA process - informed we are waiting for an outcome  Patient has additional questions:  No  If YES, what are your questions/concerns:  n/a  Okay to leave a detailed message?: No call back needed

## 2021-06-01 NOTE — TELEPHONE ENCOUNTER
I called and spoke with Father Bruce and he just wanted to make sure that his ambien rx was called in.  I notified him that Dr. Hall called it in on 8/27/19.  Patient will call back if he has any other questions.

## 2021-06-01 NOTE — TELEPHONE ENCOUNTER
I called and spoke with Father Bruce and he said that his rx needed a PA.  I called his insurance co and they are reviewing the case and will get back to Father Bruce in 24 hours.

## 2021-06-02 VITALS — BODY MASS INDEX: 38.88 KG/M2 | WEIGHT: 248.25 LBS

## 2021-06-02 VITALS — WEIGHT: 250.75 LBS | BODY MASS INDEX: 39.27 KG/M2

## 2021-06-02 VITALS — HEIGHT: 67 IN | WEIGHT: 250.2 LBS | BODY MASS INDEX: 39.27 KG/M2

## 2021-06-02 VITALS — HEIGHT: 67 IN | WEIGHT: 248 LBS | BODY MASS INDEX: 38.92 KG/M2

## 2021-06-02 VITALS — BODY MASS INDEX: 39.41 KG/M2 | WEIGHT: 251.6 LBS

## 2021-06-02 NOTE — TELEPHONE ENCOUNTER
New Appointment Needed  What is the reason for the visit:    cough/bronchitis x2 weeks  Provider Preference: PCP only  How soon do you need to be seen?: tomorrow or Thursday  Waitlist offered?: No. Requesting a call back from jamar Ariza's assistant. Declined triage, partners and next available  Okay to leave a detailed message:  Yes

## 2021-06-02 NOTE — TELEPHONE ENCOUNTER
"New Appointment Needed  What is the reason for the visit:  \  Annita made an appointment for patient, but he could not make it he said because he had been up all night. He wants to speak to Annita, as he needs sooner than Friday, which I offered to him. He said that Dr. Hall will fit him in on 10/24, Thursday. Please help to schedulde. Thank you.    Provider Preference: Dr. Hall only  How soon do you need to be seen?: Tomorrow, 10/24  Waitlist offered?:No  Okay to leave a detailed message:  Yes, he said \"sincere apology that \"I just could not get out of bed.\"    "

## 2021-06-02 NOTE — TELEPHONE ENCOUNTER
Who is calling:  Patient  Reason for Call:  Patient would like his chart notes about his CPAP supplies sent to Medicare.  Date of last appointment with primary care: 10/24/19  Okay to leave a detailed message: Yes

## 2021-06-02 NOTE — PROGRESS NOTES
Office Visit - Follow Up   Gagan Nieto   79 y.o. male    Date of Visit: 10/24/2019    Chief Complaint   Patient presents with     Cough        Assessment and Plan   1. Acute sinusitis with symptoms greater than 10 days  The duration of symptoms and the fact that he is worsening rather improving we will treat with antibiotics and steroids.  Follow-up if not improving  - methylPREDNISolone (MEDROL DOSEPACK) 4 mg tablet; follow package directions  Dispense: 21 tablet; Refill: 0  - amoxicillin (AMOXIL) 500 MG tablet; Take 1 tablet (500 mg total) by mouth 3 (three) times a day for 10 days.  Dispense: 28 tablet; Refill: 0    2. Depression  Sub-zolpidem is taking proxy which is helpful for sleep  - PARoxetine (PAXIL) 20 MG tablet; Take 1 tablet (20 mg total) by mouth daily.  Dispense: 90 tablet; Refill: 3    3. Insomnia, unspecified type  Agree with stopping zolpidem, continue paroxetine, CPAP and call me measures    4.  TERE, compliant with CPAP.  CPAP is effective at improving sleep quality and tiredness.  He is in need of CPAP supplies such as a new mask , hose, and a strap    Return in about 4 weeks (around 11/21/2019) for recheck.     History of Present Illness   This 79 y.o. old  comes in for evaluation of a cough.  This started about 2-1/2 weeks ago.  Started after he had a flu shot and a shingles vaccine.  He initially had sore throat runny nose cough.  This seems to have settled in his sinuses and chest and he has ongoing drainage coughing which is quite fatiguing.  No fever chills.  No breathing difficulty.  No chest pain.  It does seem to be worsening rather than improving.  Using Flonase which has been helpful.    Review of Systems: A comprehensive review of systems was negative except as noted.     Medications, Allergies and Problem List   Reviewed, reconciled and updated  Post Discharge Medication Reconciliation Status:      Physical Exam   General Appearance:   No acute distress    BP  "116/70 (Patient Site: Left Arm, Patient Position: Sitting, Cuff Size: Adult Regular)   Pulse (!) 53   Ht 5' 7\" (1.702 m)   Wt (!) 247 lb (112 kg)   SpO2 96%   BMI 38.69 kg/m      HEENT exam is unremarkable  Neck supple no thyromegaly or nodule palpable  Lymphatic no cervical lymphadenopathy  Cardiovascular regular rate and rhythm no murmur gallop or rub  Pulmonary lungs are clear to auscultation bilaterally  Gastrointestinal abdomen soft nontender nondistended no organomegaly  Neurologic exam is non focal  Psychiatric pleasant, no confusion or agitation        Additional Information   Current Outpatient Medications   Medication Sig Dispense Refill     amoxicillin (AMOXIL) 500 MG capsule Take 500 mg by mouth as needed Take 4 capsules by mouth one hour prior to dental appoinment as directed..       carboxymethylcellulose (REFRESH LIQUIGEL) 1 % ophthalmic solution Administer 1 drop to both eyes 2 (two) times a day.       cholecalciferol, vitamin D3, (CHOLECALCIFEROL) 1,000 unit tablet Take 5 tablets by mouth daily.       fluticasone (FLONASE) 50 mcg/actuation nasal spray 1 spray into each nostril as needed. 48 g 3     OREGANO OIL ORAL Take by mouth.       PARoxetine (PAXIL) 20 MG tablet Take 1 tablet (20 mg total) by mouth daily. 90 tablet 3     polyethylene glycol (GLYCOLAX) 17 gram/dose powder Take 17 g by mouth as needed.       psyllium (FIBER, PSYLLIUM HUSK,) 0.52 gram capsule Take 0.52 g by mouth 2 (two) times a day.        saw palmetto 160 mg cap Take by mouth.       sodium chloride (OCEAN) 0.65 % nasal spray 1 spray into each nostril as needed for congestion.       amoxicillin (AMOXIL) 500 MG tablet Take 1 tablet (500 mg total) by mouth 3 (three) times a day for 10 days. 28 tablet 0     methylPREDNISolone (MEDROL DOSEPACK) 4 mg tablet follow package directions 21 tablet 0     No current facility-administered medications for this visit.      No Known Allergies  Social History     Tobacco Use     Smoking " status: Never Smoker     Smokeless tobacco: Never Used   Substance Use Topics     Alcohol use: No     Drug use: No       Review and/or order of clinical lab tests:  Review and/or order of radiology tests:  Review and/or order of medicine tests:  Discussion of test results with performing physician:  Decision to obtain old records and/or obtain history from someone other than the patient:  Review and summarization of old records and/or obtaining history from someone other than the patient and.or discussion of case with another health care provider:  Independent visualization of image, tracing or specimen itself:    Time:      Miguelito Hall MD

## 2021-06-02 NOTE — TELEPHONE ENCOUNTER
Dr. Hall  Father Bruce said that you need to addend your OV note to state that he needs cpap supplies such as a new mask , hose, and a strap.  Than I will fax it over to  [phone]

## 2021-06-02 NOTE — TELEPHONE ENCOUNTER
Patient is calling and wants to relay a message to nurse Annita and Dr. Hall for them to send chart notes that patient is using his CPAP so patient can get supplies. Medicare has changed and they need updated chart notes.    If any questions for supplies, please call Lytro at     If any questions for patient, okay to leave detailed voicemail message.    Please forward to clinic.

## 2021-06-03 VITALS
BODY MASS INDEX: 38.77 KG/M2 | OXYGEN SATURATION: 96 % | SYSTOLIC BLOOD PRESSURE: 116 MMHG | WEIGHT: 247 LBS | DIASTOLIC BLOOD PRESSURE: 70 MMHG | HEIGHT: 67 IN | HEART RATE: 53 BPM

## 2021-06-03 VITALS — WEIGHT: 246 LBS | BODY MASS INDEX: 38.61 KG/M2 | HEIGHT: 67 IN

## 2021-06-03 NOTE — TELEPHONE ENCOUNTER
Upcoming Appointment Question  When is the appointment: 11/22 at 9:40  What is your appointment for?: CPAP Consult  Who is your appointment scheduled with?: PCP only  What is your question/concern?: Patient is requesting call form Dr. Peoples nurse, Annita regarding the appointment - has questions on documents  Okay to leave a detailed message?: Yes

## 2021-06-03 NOTE — PROGRESS NOTES
"  Office Visit - Follow Up   Gagan Nieto   79 y.o. male    Date of Visit: 11/22/2019    Chief Complaint   Patient presents with     Paperwork     CPAP        Assessment and Plan   1. TERE on CPAP 8cm  Father Bruce uses CPAP on a nightly basis.  He uses it for 7 to 8 hours.  He tolerates this without difficulty.  Prior to using CPAP he would wake up every couple of hours and not sleep well.  He felt tired throughout the day.  He now sleeps through the night and does not feel tired after good night sleep.  He would benefit from ongoing CPAP use.  - CPAP    2. Recurrent Major Depression In Full Remission  Stable continue paroxetine helps with sleep as well    3. Obesity (BMI 35.0-39.9) with comorbidity (H)  The following high BMI interventions were performed this visit: encouragement to exercise and lifestyle education regarding diet    Return in about 3 months (around 2/22/2020) for recheck.     History of Present Illness   This 79 y.o. old  comes in for follow-up.  He has obstructive sleep apnea and he uses CPAP nightly.  He uses all night generally 7 to 8 hours.  He tolerates this and it provides great benefit.  Prior to using CPAP he would wake up every couple of hours it would feel tired throughout the day.  Since starting CPAP he sleeps through the night and his tiredness has improved.  Its time for him to get new supplies.  Otherwise sleeping okay.  Stop zolpidem.  Using paroxetine which is quite helpful    Review of Systems: A comprehensive review of systems was negative except as noted.     Medications, Allergies and Problem List   Reviewed, reconciled and updated  Post Discharge Medication Reconciliation Status:      Physical Exam   General Appearance:   No acute distress    /84 (Patient Site: Right Arm, Patient Position: Sitting, Cuff Size: Adult Regular)   Pulse 72   Ht 5' 7\" (1.702 m)   Wt (!) 245 lb (111.1 kg)   SpO2 94%   BMI 38.37 kg/m      Neck supple no thyromegaly or " nodule palpable  Cardiovascular regular rate and rhythm no murmur gallop or rub  Pulmonary lungs are clear to auscultation bilaterally  Psychiatric pleasant, no confusion or agitation        Additional Information   Current Outpatient Medications   Medication Sig Dispense Refill     amoxicillin (AMOXIL) 500 MG capsule Take 500 mg by mouth as needed Take 4 capsules by mouth one hour prior to dental appoinment as directed..       carboxymethylcellulose (REFRESH LIQUIGEL) 1 % ophthalmic solution Administer 1 drop to both eyes 2 (two) times a day.       cholecalciferol, vitamin D3, (CHOLECALCIFEROL) 1,000 unit tablet Take 5 tablets by mouth daily.       fluticasone (FLONASE) 50 mcg/actuation nasal spray 1 spray into each nostril as needed. 48 g 3     OREGANO OIL ORAL Take by mouth.       PARoxetine (PAXIL) 20 MG tablet Take 1 tablet (20 mg total) by mouth daily. 90 tablet 3     polyethylene glycol (GLYCOLAX) 17 gram/dose powder Take 17 g by mouth as needed.       psyllium (FIBER, PSYLLIUM HUSK,) 0.52 gram capsule Take 0.52 g by mouth 2 (two) times a day.        saw palmetto 160 mg cap Take by mouth.       sodium chloride (OCEAN) 0.65 % nasal spray 1 spray into each nostril as needed for congestion.       No current facility-administered medications for this visit.      No Known Allergies  Social History     Tobacco Use     Smoking status: Never Smoker     Smokeless tobacco: Never Used   Substance Use Topics     Alcohol use: No     Drug use: No       Review and/or order of clinical lab tests:  Review and/or order of radiology tests:  Review and/or order of medicine tests:  Discussion of test results with performing physician:  Decision to obtain old records and/or obtain history from someone other than the patient:  Review and summarization of old records and/or obtaining history from someone other than the patient and.or discussion of case with another health care provider:  Independent visualization of image, tracing  or specimen itself:    Time:      Miguelito Hall MD

## 2021-06-03 NOTE — TELEPHONE ENCOUNTER
Who is calling:  Gagan Nieto   Reason for Call: Patient wanted to let Annita Steinert know that he found pressure of C Pap machine, its 8 cm   Date of last appointment with primary care:  11/22/19  Has the patient been recently seen:  Yes  Okay to leave a detailed message: Yes

## 2021-06-04 VITALS
DIASTOLIC BLOOD PRESSURE: 74 MMHG | OXYGEN SATURATION: 97 % | HEIGHT: 67 IN | WEIGHT: 247 LBS | SYSTOLIC BLOOD PRESSURE: 112 MMHG | BODY MASS INDEX: 38.77 KG/M2 | HEART RATE: 64 BPM

## 2021-06-04 VITALS
HEIGHT: 67 IN | WEIGHT: 245 LBS | OXYGEN SATURATION: 94 % | HEART RATE: 72 BPM | DIASTOLIC BLOOD PRESSURE: 84 MMHG | BODY MASS INDEX: 38.45 KG/M2 | SYSTOLIC BLOOD PRESSURE: 122 MMHG

## 2021-06-04 VITALS
WEIGHT: 246 LBS | OXYGEN SATURATION: 94 % | HEIGHT: 67 IN | BODY MASS INDEX: 38.61 KG/M2 | DIASTOLIC BLOOD PRESSURE: 64 MMHG | HEART RATE: 66 BPM | SYSTOLIC BLOOD PRESSURE: 104 MMHG

## 2021-06-04 VITALS
SYSTOLIC BLOOD PRESSURE: 124 MMHG | HEART RATE: 64 BPM | TEMPERATURE: 98.9 F | DIASTOLIC BLOOD PRESSURE: 82 MMHG | BODY MASS INDEX: 38.22 KG/M2 | WEIGHT: 244 LBS

## 2021-06-05 VITALS
SYSTOLIC BLOOD PRESSURE: 112 MMHG | HEIGHT: 67 IN | OXYGEN SATURATION: 97 % | DIASTOLIC BLOOD PRESSURE: 62 MMHG | HEART RATE: 60 BPM | WEIGHT: 238 LBS | BODY MASS INDEX: 37.35 KG/M2

## 2021-06-06 NOTE — TELEPHONE ENCOUNTER
That's tough.  Given he was in [place], I recommend trying to avoid people as much as possible.  If he is feeling well, no fever or cough, if he decides to do it, avoid hand shakes, close contact (6 feet), hugs, etc.

## 2021-06-06 NOTE — TELEPHONE ENCOUNTER
Refill Approved  Needs diagnosis code. Last prescribed by a provide no longer here.    Rx renewed per Medication Renewal Policy. Medication was last renewed on 10/28/17    Patricia Garcia, [facility] Triage/Med Refill 2/10/2020     Requested Prescriptions   Pending Prescriptions Disp Refills     fluticasone propionate (FLONASE) 50 mcg/actuation nasal spray 48 g 3     Si spray into each nostril as needed.       Nasal Steroid Refill Protocol Passed - 2/10/2020  7:36 AM        Passed - Patient has had office visit/physical in last 2 years     Last office visit with prescriber/PCP: 2019 OR same dept: 2019 Miguelito Hall MD OR same specialty: 2019 Miguelito Hall MD Last physical: Visit date not found Last MTM visit: Visit date not found    Next appt within 3 mo: Visit date not found  Next physical within 3 mo: Visit date not found  Prescriber OR PCP: Miguelito Hall MD  Last diagnosis associated with med order: There are no diagnoses linked to this encounter.   If protocol passes may refill for 12 months if within 3 months of last provider visit (or a total of 15 months).

## 2021-06-06 NOTE — TELEPHONE ENCOUNTER
Who is calling:  Patient  Reason for Call:  Patient would like to speak to Dr. Hall or with nurse Annita. Patient is a retired  and got a request to do a  this coming Saturday. Patient says he was in [place] for a month and didn't return until . He states he didn't see anyone that was sick, but on the flight back home he was on 2 different planes and 2 different airports. He just wants to know if it will be safe for him to go due to his age.  Date of last appointment with primary care:   Okay to leave a detailed message: Yes

## 2021-06-06 NOTE — TELEPHONE ENCOUNTER
Dr. Hall  I called and spoke with Father Bruce and he said that he was asked to do a  this Saturday for a little boy.  Father Bruce isn't sure if he should do the  or not.  He would like your advise.

## 2021-06-07 NOTE — TELEPHONE ENCOUNTER
Spoke to patient, he says his ears feel like there is fluid in them and his hearing is not at his baseline. He is asking if Dr. Farley would see him to suction the fluid. I told him I will ask and call him back.

## 2021-06-07 NOTE — TELEPHONE ENCOUNTER
Left message for patient to call back. Seeing if he can see Dr. Farley today in clinic.     Audra Mccord RN  [facility]  [phone]

## 2021-06-07 NOTE — TELEPHONE ENCOUNTER
Patient left a voicemail stating he has a plugged ear and is wondering if Dr. Farley will see him. I called patient back and left a voicemail.    Audra Mccord RN  [facility]  [phone]

## 2021-06-07 NOTE — PROGRESS NOTES
HISTORY OF PRESENT ILLNESS  Patient is well known to me from my [place] office. He reports that his ears have felt plugged and his hearing has been decrease. He is wondering if he has an ear infection. In addition he would like to have his nose and throat checked. He has a history of allergies and takes fluticasone.     REVIEW OF SYSTEMS  Review of Systems: a 10-system review was performed. Pertinent positives are noted in the HPI and on a separate scanned document in the chart.    PMH, PSH, FH and SH has documented in the EHR.      EXAM    CONSTITUTIONAL  General Appearance:  Normal, well developed, well nourished, no obvious distress  Ability to Communicate:  communicates appropriately.    HEAD AND FACE  Appearance and Symmetry:  Normal, no scalp or facial scarring or suspicious lesions.    EARS  Clinical speech reception threshold:  Normal, able to hear normal speech.  Auricle:  Normal, Auricles without scars, lesions, masses.  External auditory canal:  Bilateral cerumen impaction debrided under otomicroscopy using microdissection technique..  Tympanic membrane: Left TM with perforation and right with myringostapediopexy. No infection.     NOSE (speculum or scope)  Architecture:  Normal, Grossly normal external nasal architecture with no masses or lesions.  Mucosa:  Normal mucosa, No polyps or masses.  Septum:  Normal, Septum non-obstructing.  Turbinates:  Normal, No turbinate abnormalities    ORAL CAVITY AND OROPHARYNX  Lips:  Normal.  Dental and gingiva:  Normal, No obvious dental or gingival disease.  Mucosa:  Normal, Moist mucous membranes.  Tongue:  Normal, Tongue mobile with no mucosal abnormalities  Hard and soft palate:  Normal, Hard and soft palate without cleft or mucosal lesions.  Oral pharynx:  Normal, Posterior pharynx without lesions or remarkable asymmetry.  Saliva:  Normal, Clear saliva.  Masses:  Normal, No palpable masses or pathologically enlarged lymph nodes.    NECK  Masses/lymph nodes:   Normal, No worrisome neck masses or lymph nodes.  Salivary glands:  Normal, Parotid and submandibular glands.  Trachea and larynx position:  Normal, Trachea and larynx midline.  Thyroid:  Normal, No thyroid abnormality.  Tenderness:  Normal, No cervical tenderness.  Suppleness:  Normal, Neck supple    NEUROLOGICAL  Speech pattern:  Normal, Proasaic    RESPIRATORY  Symmetry and Respiratory effort:  Normal, Symmetric chest movement and expansion with no increased intercostal retractions or use of accessory muscles.     IMPRESSION  Bilateral cerumen impaction with left TM perforation and right myringostapediopexy. Hearing subjectively better after removal of the cerumen.    RECOMMENDATION  Patient reports improvement in hearing. Follow up as needed.    Nigel Farley MD

## 2021-06-08 NOTE — PROGRESS NOTES
"Gagan Nieto  1940      Assessment and Plan:  1. DJD- doing well post right TKR  2. Chronic insomnia- uses ambien .will try getting by with 5 mg (1/2 10 mg tablet); explained controlled substance and that has been an obstacle in getting refills and [address] pharmacy doesn't accept efax... CSA signed; reviewed groundrules  3. Anxiety- chronic paxil works well  4 RTC 6 months;       Chief Complaint: f/u multiple    Visit diagnoses:    1. Anxiety     2. Insomnia  zolpidem (AMBIEN) 10 mg tablet   3. Hyperlipemia  Comprehensive Metabolic Panel    HM2(CBC w/o Differential)    Lipid Cascade   4. Primary osteoarthritis of right knee         Meds:  Current Outpatient Prescriptions   Medication Sig Dispense Refill     carboxymethylcellulose (REFRESH LIQUIGEL) 1 % ophthalmic solution Apply 1 drop to eye 2 (two) times a day.        cholecalciferol, vitamin D3, 1,000 unit tablet Take 2 tablets by mouth daily.        clobetasol (TEMOVATE) 0.05 % external solution   3     fluticasone (FLONASE) 50 mcg/actuation nasal spray 1 spray into each nostril as needed. 16 g 0     glycerin, adult, Supp rectal suppository Apply 1 suppository around the anus daily as needed (constipation). Indications: Bowel Evacuation       ketoconazole (NIZORAL) 2 % shampoo   3     PARoxetine (PAXIL) 20 MG tablet Take 1 tablet (20 mg total) by mouth daily. 90 tablet 0     polyethylene glycol (GLYCOLAX) 17 gram/dose powder Take 17 g by mouth daily.        psyllium (FIBER, PSYLLIUM HUSK,) 0.52 gram capsule Take 0.52 g by mouth 2 (two) times a day.        sodium chloride (OCEAN) 0.65 % nasal spray 1 spray into each nostril as needed for congestion.       zolpidem (AMBIEN) 10 mg tablet TAKE ONE TABLET BY MOUTH AT BEDTIME AS NEEDED FOR SLEEP 30 tablet 0     No current facility-administered medications for this visit.        No Known Allergies    ROS: complete review of symptoms otherwise negative except as noted below    S:  Doing very well knee at \"80 " "%\". No pain issues some residual swelling as expected. Chronic insomnia/anxiety meds ambien and paxil help. Will try to cut down to 1/2 tab/ reviewed   O:   Vitals:    01/19/17 0937   BP: 108/60   Patient Site: Left Arm   Patient Position: Sitting   Cuff Size: Adult Large   Pulse: 64   Resp: 16   Temp: 97.3  F (36.3  C)   TempSrc: Oral   Weight: 220 lb 9.6 oz (100.1 kg)   Height: 5' 7\" (1.702 m)       Physical Exam:  General- wearing Russel collar, Franciscan ; anxious affect at times  VS- see above  HEENT- neg   Neck- no adenopathy/thyromegaly/bruits  Chest- clear   Cor- reg no murmurs/gallops/ectopics  Abdomen- soft non tender, no masses; no organomegaly  Extremities: no edema, good distal pulses; venous insufficiency. Right knee scar some edema minimal   Neuro- Cr. NN-  intact, alert,       Recent Results (from the past 240 hour(s))   HM2(CBC w/o Differential)   Result Value Ref Range    WBC 4.2 4.0 - 11.0 thou/uL    RBC 4.77 4.40 - 6.20 mill/uL    Hemoglobin 14.2 14.0 - 18.0 g/dL    Hematocrit 42.5 40.0 - 54.0 %    MCV 89 80 - 100 fL    MCH 29.7 27.0 - 34.0 pg    MCHC 33.4 32.0 - 36.0 g/dL    RDW 12.1 11.0 - 14.5 %    Platelets 154 140 - 440 thou/uL    MPV 7.0 7.0 - 10.0 fL         Jean Pierre Ann MD              "

## 2021-06-08 NOTE — TELEPHONE ENCOUNTER
Referral Request  Type of referral: Referral to [facility]  Who s requesting: Patient  Why the request: Patient stated he needs a referral to be seen for a 3 year follow up  Have you been seen for this request: Yes  Does patient have a preference on a group/provider? [facility] on [facility], Dr Jefferson Mancia  Okay to leave a detailed message?  Yes

## 2021-06-09 NOTE — PROGRESS NOTES
Assessment and Plan:   1. Routine general medical examination at a health care facility  This is an 80-year-old man with issues as discussed below  - Electrocardiogram Perform and Read    2. Recurrent Major Depression In Full Remission  Stable on paroxetine    3. Gastroesophageal reflux disease without esophagitis  Stable, not currently using any antacid  - HM2(CBC w/o Differential)  - Basic Metabolic Panel    4. Insomnia, unspecified type  5. TERE on CPAP 8cm  Stable continue CPAP    6. Obesity (BMI 35.0-39.9) with comorbidity (H)  Discussed weight loss.  Although he is fairly obese he is not limited.  We did discuss that losing some weight could help with some osteoarthritic issues    7. Varicose veins of both lower extremities with pain  Does have significant varicose veins left greater than right, no ulcers but some discomfort and swelling.  Trial of compression garments.  Also set up for a surgical evaluation  - Ambulatory referral for Orthotics / Prosthetics - [place]    8. Screening for hyperlipidemia  - Lipid Cascade    9. Advanced care planning/counseling discussion  We had a 17-minute discussion today.  His daughter would be his medical decision-maker in the event that he is unable to make decisions for himself.  He remains full code.  He would be okay with hospitalization with COVID-19.  He would be okay with short-term intubation with COVID-19.  He would not want ongoing intervention if he had no meaningful chance for recovery.  He would want to be kept comfortable.  He has some advance care directive which reviewed.    The patient's current medical problems were reviewed.    I have had an Advance Directives discussion with the patient.  The following health maintenance schedule was reviewed with the patient and provided in printed form in the after visit summary:   Health Maintenance   Topic Date Due     INFLUENZA VACCINE RULE BASED (1) 08/01/2020     FALL RISK ASSESSMENT  10/24/2020     MEDICARE  ANNUAL WELLNESS VISIT  07/21/2021     TD 18+ HE  01/11/2022     ADVANCE CARE PLANNING  05/30/2023     DEPRESSION ACTION PLAN  Completed     PNEUMOCOCCAL IMMUNIZATION 65+ LOW/MEDIUM RISK  Completed     ZOSTER VACCINES  Addressed     HEPATITIS B VACCINES  Aged Out        Subjective:   Chief Complaint: Gagan Nieto is an 80 y.o. male here for an Annual Wellness visit.   HPI: This 80-year-old  comes in for annual wellness visit.  Overall doing well.  Some isolation with COVID-19.  Reports mood is been okay.  Issues with degenerative joint disease which is been stable.  Sleep is been okay.  Has obstructive sleep apnea is on CPAP.  Reflux is been controlled.  Weight is generally stable.  Not limited apart from some osteoarthritic issues.  Follows with ophthalmology.  Hard of hearing.    Review of Systems:   Please see above.  The rest of the review of systems are negative for all systems.    Patient Care Team:  Miguelito Hall MD as PCP - General (Internal Medicine)  Miguelito Hall MD as Assigned PCP     Patient Active Problem List   Diagnosis     Recurrent Major Depression In Full Remission     TERE on CPAP 8cm     Insomnia     Gastroesophageal reflux disease without esophagitis     Uses hearing aid     Status post right knee replacement     Posterior vitreous detachment, both eyes     Controlled substance agreement signed     Obesity (BMI 35.0-39.9) with comorbidity (H)     Past Medical History:   Diagnosis Date     Benign Adenomatous Polyp Of The Large Intestine     Created by Conversion      Calculus of gallbladder 9/15/2015     GERD (gastroesophageal reflux disease)      Hematuria      Hyperlipidemia      Insomnia      Internal hemorrhoids      Nephrolithiasis     Created by Conversion [facility] Annotation: Feb 13 2008 11:26AM - Puja Hinsonil: on HCTZ      Obesity      Osteoarthritis      Phimosis     Created by Conversion      Recurrent major depression in full remission (H)      Sleep  apnea     CPAP     Tinnitus      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     Created by Conversion      Uses hearing aid      Varicose Veins     Created by Conversion      Vitamin D deficiency       Past Surgical History:   Procedure Laterality Date     CIRCUMCISION       INGUINAL HERNIA REPAIR Bilateral      LAPAROSCOPIC CHOLECYSTECTOMY N/A 10/8/2015    Procedure: CHOLECYSTECTOMY LAPAROSCOPIC;  Surgeon: Dimitri Estrada MD;  Location: [facility];  Service:      IN TOTAL KNEE ARTHROPLASTY Right 2016    Procedure: RIGHT KNEE TOTAL ARTHROPLASTY;  Surgeon: Callum Hou MD;  Location: [facility];  Service: Orthopedics      Family History   Problem Relation Age of Onset     Colon cancer Mother      Colon cancer Brother      Colon cancer Brother      Dementia Sister      Dementia Sister      No Medical Problems Daughter      No Medical Problems Son      No Medical Problems Son       Social History     Socioeconomic History     Marital status:      Spouse name: Nela/     Number of children: 3     Years of education: Not on file     Highest education level: Not on file   Occupational History     Occupation: [employer]      Comment: ordained ; 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     Occupation: Banker     Employer: RETIRED     Comment: remote retired/   Social Needs     Financial resource strain: Not on file     Food insecurity     Worry: Not on file     Inability: Not on file     Transportation needs     Medical: Not on file     Non-medical: Not on file   Tobacco Use     Smoking status: Never Smoker     Smokeless tobacco: Never Used   Substance and Sexual Activity     Alcohol use: No     Drug use: No     Sexual activity: Never   Lifestyle     Physical activity     Days per week: Not on file     Minutes per session: Not on file     Stress: Not on file   Relationships     Social connections     Talks on phone: Not on file     Gets together: Not  "on file     Attends Synagogue service: Not on file     Active member of club or organization: Not on file     Attends meetings of clubs or organizations: Not on file     Relationship status: Not on file     Intimate partner violence     Fear of current or ex partner: Not on file     Emotionally abused: Not on file     Physically abused: Not on file     Forced sexual activity: Not on file   Other Topics Concern     Not on file   Social History Narrative    Grew up [place] [address] [place] [address]/[facility]. 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 3 Kids (1 dtr/2 sons); wife  . He continued as deacon and then ordained into [facility] into the [employer], [employer]- . 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 Six grand kids. Non smoker/non drinker.        Current Outpatient Medications   Medication Sig Dispense Refill     amoxicillin (AMOXIL) 500 MG capsule Take 500 mg by mouth as needed Take 4 capsules by mouth one hour prior to dental appoinment as directed..       carboxymethylcellulose (REFRESH LIQUIGEL) 1 % ophthalmic solution Administer 1 drop to both eyes 2 (two) times a day.       cholecalciferol, vitamin D3, (CHOLECALCIFEROL) 1,000 unit tablet Take 5 tablets by mouth daily.       fluticasone propionate (FLONASE) 50 mcg/actuation nasal spray 1 spray into each nostril as needed. 48 g 3     OREGANO OIL ORAL Take by mouth.       PARoxetine (PAXIL) 20 MG tablet Take 1 tablet (20 mg total) by mouth daily. 90 tablet 3     polyethylene glycol (GLYCOLAX) 17 gram/dose powder Take 17 g by mouth as needed.       psyllium (FIBER, PSYLLIUM HUSK,) 0.52 gram capsule Take 0.52 g by mouth 2 (two) times a day.        saw palmetto 160 mg cap Take by mouth.       sodium chloride (OCEAN) 0.65 % nasal spray 1 spray into each nostril as needed for congestion.       No current " "facility-administered medications for this visit.       Objective:   Vital Signs:   Visit Vitals  /64 (Patient Site: Right Arm, Patient Position: Sitting, Cuff Size: Adult Regular)   Pulse 66   Ht 5' 7\" (1.702 m)   Wt (!) 246 lb (111.6 kg)   SpO2 94%   BMI 38.53 kg/m           VisionScreening:  No exam data present     PHYSICAL EXAM  EYES: Eyelids, conjunctiva, and sclera were normal. Pupils were normal. Cornea, iris, and lens were normal bilaterally.  HEAD, EARS, NOSE, MOUTH, AND THROAT: Head and face were normal. Hearing was normal to voice and the ears were normal to external exam. Nose appearance was normal and there was no discharge. Oropharynx was normal.  NECK: Neck appearance was normal. There were no neck masses and the thyroid was not enlarged.  RESPIRATORY: Breathing pattern was normal and the chest moved symmetrically.  Percussion/auscultatory percussion was normal.  Lung sounds were normal and there were no abnormal sounds.  CARDIOVASCULAR: Heart rate and rhythm were normal.  S1 and S2 were normal and there were no extra sounds or murmurs. Peripheral pulses in arms and legs were normal.  Jugular venous pressure was normal.  There was no peripheral edema.  GASTROINTESTINAL: The abdomen was normal in contour.  Bowel sounds were present.  Percussion detected no organ enlargement or tenderness.  Palpation detected no tenderness, mass, or enlarged organs.   MUSCULOSKELETAL: Skeletal configuration was normal and muscle mass was normal for age. Joint appearance was overall normal.  LYMPHATIC: There were no enlarged nodes.  SKIN/HAIR/NAILS: Skin color was normal.  There were no skin lesions.  Hair and nails were normal.  NEUROLOGIC: The patient was alert and oriented to person, place, time, and circumstance. Speech was normal. Cranial nerves were normal. Motor strength was normal for age. The patient was normally coordinated.  PSYCHIATRIC:  Mood and affect were normal and the patient had normal recent and " remote memory. The patient's judgment and insight were normal.    Assessment Results 7/21/2020   Activities of Daily Living No help needed   Instrumental Activities of Daily Living No help needed   Get Up and Go Score Less than 12 seconds   Mini Cog Total Score 3   Some recent data might be hidden     A Mini-Cog score of 0-2 suggests the possibility of dementia, score of 3-5 suggests no dementia    Identified Health Risks:     The patient was provided with written information regarding signs of hearing loss.  Patient's advanced directive was discussed and I am comfortable with the patient's wishes.

## 2021-06-10 NOTE — PROGRESS NOTES
This is a new consult for Varicose Veins. The patient has varicose veins that are problematic in left legs. Symptoms patient has been experiencing are heaviness, tiredness and discoloration. Patient has not been wearing compression stockings.     Discoloration  is present.  Pt has not been using pain medication or antiinflammatory's.

## 2021-06-10 NOTE — PROGRESS NOTES
Gagan Nieto  1940    Pre-op:  Date of Surgery: 4/13/2017  Procedure:ORIF, left distal radius fx  Surgeon: Hank Rinaldi MD, [facility]  Place: [facility]- fax # [phone]    Assessment and Plan:  1. Stable for planned orthopedic procedure noted above  2. See labs/EKG all stable   3. No recent significant cardiac/neuro/resp symptoms   4. Hx uncomplicated TKR  8/2016      Chief Complaint: pre-op     Visit diagnoses:    1. Preop exam for internal medicine  Electrocardiogram Perform and Read    Basic Metabolic Panel    HM2(CBC w/o Differential)   2. Fracture of forearm, distal     3. Status post right knee replacement       Patient Active Problem List   Diagnosis     Internal Hemorrhoids     Recurrent Major Depression In Full Remission     Adult Sleep Apnea     Vitamin D Deficiency     Hyperlipidemia     Obesity     Tinnitus     Depression     Insomnia     Gastroesophageal reflux disease without esophagitis     Uses hearing aid     Status post right knee replacement     Primary osteoarthritis of knee     Past Medical History:   Diagnosis Date     Benign Adenomatous Polyp Of The Large Intestine     Created by Conversion      Calculus of gallbladder 9/15/2015     GERD (gastroesophageal reflux disease)      Hematuria      Hyperlipidemia      Insomnia      Internal hemorrhoids      Nephrolithiasis     Created by Conversion [facility] Annotation: Feb 13 2008 11:26AM - Puja Hinsonil: on HCTZ      Obesity      Osteoarthritis      Phimosis     Created by Conversion      Recurrent major depression in full remission      Sleep apnea 2001    CPAP     Tinnitus      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 [facility] on scholarship... 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 3 Kids (1 dtr/2 sons); wife  . 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  into the [facility] order, [employer]- . 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 Non smoker/non drinker     Family History   Problem Relation Age of Onset     Colon cancer Mother      Colon cancer Brother      Colon cancer Brother      Dementia Sister      Dementia Sister        Meds:  Current Outpatient Prescriptions   Medication Sig Dispense Refill     carboxymethylcellulose (REFRESH LIQUIGEL) 1 % ophthalmic solution Apply 1 drop to eye 2 (two) times a day.        cholecalciferol, vitamin D3, 1,000 unit tablet Take 2 tablets by mouth daily.        clobetasol (TEMOVATE) 0.05 % external solution   3     fluticasone (FLONASE) 50 mcg/actuation nasal spray 1 spray into each nostril as needed. 16 g 0     glycerin, adult, Supp rectal suppository Apply 1 suppository around the " "anus daily as needed (constipation). Indications: Bowel Evacuation       ketoconazole (NIZORAL) 2 % shampoo   3     PARoxetine (PAXIL) 20 MG tablet Take 1 tablet (20 mg total) by mouth daily. 90 tablet 0     polyethylene glycol (GLYCOLAX) 17 gram/dose powder Take 17 g by mouth daily.        psyllium (FIBER, PSYLLIUM HUSK,) 0.52 gram capsule Take 0.52 g by mouth 2 (two) times a day.        sodium chloride (OCEAN) 0.65 % nasal spray 1 spray into each nostril as needed for congestion.       zolpidem (AMBIEN) 10 mg tablet TAKE ONE TABLET BY MOUTH AT BEDTIME AS NEEDED FOR SLEEP 30 tablet 0     No current facility-administered medications for this visit.        No Known Allergies    ROS: complete review of symptoms otherwise negative except as noted below    S:  Tripped and fell on uneven sidewalk 4/4/17. Seen in  extensive workup including CT head and xrays/  and then [place] for displaced distal radial fx. Surgery scheduled/ pain controlled. No cardiac or other concerns. Discouraged because he won't be able participate in the Easter liturgy this weekend/ His is a [employer] . He has family who will help post-op        O:   Vitals:    04/12/17 1018   BP: 104/64   Patient Site: Left Arm   Patient Position: Sitting   Cuff Size: Adult Regular   Pulse: 64   Weight: (!) 233 lb 8 oz (105.9 kg)   Height: 5' 7\" (1.702 m)       Physical Exam:  General- Russel collar on/   VS- see above;  HEENT- neg   Neck- no adenopathy/thyromegaly/bruits  Chest- clear   Cor- reg no murmurs/gallops/ectopics  Abdomen- soft non tender, no masses; no organomegaly  Extremities: no edema, good distal pulses  Neuro- Cr. NN-  intact, alert, as always very pleasant, sincere,  outgoing mood/affect  Skin- no suspicious lesions for malignancy; some rosacea on nose  Lymph- no pathologic nodes in neck/axilla/groin  Musculoskeletal- left forearm in large wrap. -    Recent Results (from the past 240 hour(s))   Electrocardiogram " Perform and Read   Result Value Ref Range    SYSTOLIC BLOOD PRESSURE  mmHg    DIASTOLIC BLOOD PRESSURE  mmHg    VENTRICULAR RATE 54 BPM    ATRIAL RATE 54 BPM    P-R INTERVAL 288 ms    QRS DURATION 98 ms    Q-T INTERVAL 426 ms    QTC CALCULATION (BEZET) 403 ms    P Axis 12 degrees    R AXIS -23 degrees    T AXIS 10 degrees    MUSE DIAGNOSIS       Sinus bradycardia with 1st degree A-V block  Otherwise normal ECG  When compared with ECG of 23-AUG-2016 14:57,  No significant change was found     Basic Metabolic Panel   Result Value Ref Range    Sodium 141 136 - 145 mmol/L    Potassium 4.3 3.5 - 5.0 mmol/L    Chloride 106 98 - 107 mmol/L    CO2 25 22 - 31 mmol/L    Anion Gap, Calculation 10 5 - 18 mmol/L    Glucose 85 70 - 125 mg/dL    Calcium 9.5 8.5 - 10.5 mg/dL    BUN 17 8 - 28 mg/dL    Creatinine 0.79 0.70 - 1.30 mg/dL    GFR MDRD Af Amer >60 >60 mL/min/1.73m2    GFR MDRD Non Af Amer >60 >60 mL/min/1.73m2   HM2(CBC w/o Differential)   Result Value Ref Range    WBC 4.1 4.0 - 11.0 thou/uL    RBC 4.68 4.40 - 6.20 mill/uL    Hemoglobin 13.9 (L) 14.0 - 18.0 g/dL    Hematocrit 41.7 40.0 - 54.0 %    MCV 89 80 - 100 fL    MCH 29.7 27.0 - 34.0 pg    MCHC 33.3 32.0 - 36.0 g/dL    RDW 13.3 11.0 - 14.5 %    Platelets 197 140 - 440 thou/uL    MPV 7.0 7.0 - 10.0 fL           Jean Pierre Ann MD

## 2021-06-11 NOTE — TELEPHONE ENCOUNTER
You have no openings for weeks. Should I check with Annita to find a spot to fit him? How soon? Thank you.    Marisa Chahal, CMA

## 2021-06-11 NOTE — PROGRESS NOTES
"  Office Visit - Follow Up   Gagan Nieto   80 y.o. male    Date of Visit: 9/3/2020    Chief Complaint   Patient presents with     Flank Pain     right        Assessment and Plan   1. Right groin pain  Etiology uncertain, concerning for epididymitis given tenderness and swelling on exam.  Also discussed the possibility of recurrent hernia that I am unable to appreciate on exam.  Discussed possibility of radicular low back pain, kidney stone or osteoarthritic pain from the right hip as well.  We are going to try 2 weeks of Bactrim check x-ray and labs as below and he will let me know how he is feeling in 2 weeks, consider further evaluation for kidney stone, hernia and radicular low back pain if not improved  - XR Pelvis W 2 Vw Hip Right; Future    2. Right testicular pain  - Urinalysis-UC if Indicated  - White Blood Count (WBC)  - C-Reactive Protein    3. Right-sided low back pain without sciatica, unspecified chronicity  - XR Pelvis W 2 Vw Hip Right; Future      Return in about 2 weeks (around 9/17/2020) for recheck.     History of Present Illness   This 80 y.o. old  comes in for evaluation of right groin and testicular and back pain.  This started about 6 weeks ago.  It is mild to moderate in severity.  Movement seems to worsen the pain.  No fever or chills.  No dysuria.  History of hernia on the right side previously which was repaired.  He is noticed no bulging.  No change in bowel habits.    Review of Systems: A comprehensive review of systems was negative except as noted.     Medications, Allergies and Problem List   Reviewed, reconciled and updated  Post Discharge Medication Reconciliation Status:      Physical Exam   General Appearance:   No acute distress    /74 (Patient Site: Left Arm, Patient Position: Sitting, Cuff Size: Adult Regular)   Pulse 64   Ht 5' 7\" (1.702 m)   Wt (!) 247 lb (112 kg)   SpO2 97%   BMI 38.69 kg/m      I am unable to palpate a hernia on exam.  He does have some " swelling of the right epididymis and tenderness with palpation.  Some mild tenderness in the right inguinal canal region as well.  Fairly good range of motion of the hip without pain with internal rotation or adbuction.     Additional Information   Current Outpatient Medications   Medication Sig Dispense Refill     amoxicillin (AMOXIL) 500 MG capsule Take 500 mg by mouth as needed Take 4 capsules by mouth one hour prior to dental appoinment as directed..       carboxymethylcellulose (REFRESH LIQUIGEL) 1 % ophthalmic solution Administer 1 drop to both eyes 2 (two) times a day.       cholecalciferol, vitamin D3, (CHOLECALCIFEROL) 1,000 unit tablet Take 5 tablets by mouth daily.       fluticasone propionate (FLONASE) 50 mcg/actuation nasal spray 1 spray into each nostril as needed. 48 g 3     OREGANO OIL ORAL Take by mouth.       polyethylene glycol (GLYCOLAX) 17 gram/dose powder Take 17 g by mouth as needed.       psyllium (FIBER, PSYLLIUM HUSK,) 0.52 gram capsule Take 0.52 g by mouth 2 (two) times a day.        saw palmetto 160 mg cap Take by mouth.       PARoxetine (PAXIL) 20 MG tablet Take 1 tablet (20 mg total) by mouth daily. 90 tablet 3     sulfamethoxazole-trimethoprim (BACTRIM DS) 800-160 mg per tablet Take 1 tablet by mouth 2 (two) times a day for 14 days. 28 tablet 0     No current facility-administered medications for this visit.      Allergies   Allergen Reactions     Mold      Social History     Tobacco Use     Smoking status: Never Smoker     Smokeless tobacco: Never Used   Substance Use Topics     Alcohol use: No     Drug use: No       Review and/or order of clinical lab tests:  Review and/or order of radiology tests:  Review and/or order of medicine tests:  Discussion of test results with performing physician:  Decision to obtain old records and/or obtain history from someone other than the patient:  Review and summarization of old records and/or obtaining history from someone other than the patient  and.or discussion of case with another health care provider:  Independent visualization of image, tracing or specimen itself:    Time:      Miguelito Hall MD

## 2021-06-11 NOTE — TELEPHONE ENCOUNTER
"Patient is calling. The last 4-5 weeks he has had a pain in the right side of his abdomen.  Pain starts around the hip and radiates down to the testicle.   No swelling in the testicle.   Constant pain, worse when getting up.     Pt advised of protocol recommendation to go to ED for evaluation.  Pt declined ED disposition, requests appointment with Dr. Cheek.   Pt states he is a hospital  and states he would like to see Dr. Hall.      Pt is requesting call from Dr. Hall's care team about getting scheduled for appointment.     Please advise if okay to get Father Bruce scheduled for appointment in clinic vs. Having him go to ED for evaluation.    Jeanie Benitez RN 09/02/20 4:18 PM  [facility] Nurse Advisor        Reason for Disposition    [1] SEVERE pain (e.g., excruciating) AND [2] present > 1 hour    [1] SEVERE pain AND [2] age > 60    [1] Pain in the scrotum or testicle AND [2] present > 1 hour    Additional Information    Negative: Shock suspected (e.g., cold/pale/clammy skin, too weak to stand, low BP, rapid pulse)    Negative: Difficult to awaken or acting confused (e.g., disoriented, slurred speech)    Negative: Passed out (i.e., lost consciousness, collapsed and was not responding)    Negative: Sounds like a life-threatening emergency to the triager    Negative: Chest pain    Negative: Pain is mainly in upper abdomen  (if needed ask: \"is it mainly above the belly button?\")    Negative: Followed an abdomen (stomach) injury    Negative: [1] Vomiting AND [2] contains red blood or black (\"coffee ground\") material  (Exception: few red streaks in vomit that only happened once)    Negative: Blood in bowel movements  (Exception: Blood on surface of BM with constipation)    Negative: Black or tarry bowel movements  (Exception: chronic-unchanged  black-grey bowel movements AND is taking iron pills or Pepto-bismol)    Negative: [1] Unable to urinate (or only a few drops) > 4 hours AND [2] bladder feels " very full (e.g., palpable bladder or strong urge to urinate)    Protocols used: ABDOMINAL PAIN - MALE-A-AH

## 2021-06-11 NOTE — PROGRESS NOTES
[facility] Follow Up Note  Assessment/Plan  1. Lyme disease     2. Bug bite, initial encounter         Patient Instructions   1. Medications were reviewed and medication refills completed if requested.   A corrected Medication List is listed below on this After Visit    Summary.   New Medications, Refilled medications or Discontinued medications are also listed below.      2. Immunizations were reviewed and updated as necessary.   All up to date  3. If labs were done today, they will either be mailed to you or released to My Chart online if that has been activated.  4. Rx DOXYCYCLINE 100mg TWICE DAILY FOR 10 DAYS.  Either he has an infected tick bite or insect bite or he has Lyme disease.  He has Lyme disease is very early and is not that sick and so I think we can treat with a 10 day course of doxycycline.  There is no point in doing a blood test because will not alter our treatment and earlier in the disease is quite possible that the Lyme test would be negative any help  I went over taking doxycycline appropriately with regard to GI problems and sun sensitivity problems in detail.  5. If he Fails to improve or certainly if he worsens he needs to follow-up with Dr. Freeman his primary care provider.        Body mass index is 37.12 kg/(m^2).    Yevgeniy Giles MD  Internal Medicine [place] [address]  Voice: [phone]  Fax: [phone]    +++++++++++++++++++++++++++++++++++++++    Gagan Willsjodee   77 y.o. male    Date of Visit: 7/3/2017    Chief Complaint   Patient presents with     Insect Bite     Lt upper arm rash x10 days     Subjective  1. Health Maintenance  -immunizations are up-to-date.     2.  Bug bite left arm with possible ECM rash -the patient is a  who reports that he was doing a  service in [facility] under a lot of trees and believes that a bug got onto his left arm.  He did not ever see  "it or identified.  That was a couple weeks ago.  In that location certainly could have been a tick.  Subsequently he noted a spot where there was a bite and then increasing redness surrounding the site.  Initially the site was an expanding rounded rash but now in the last day or 2 there is a little bit of linear spread to the left which makes it less circular.  He has had no fever or chills.  He has had no other constitutional symptoms.    When I look at his rash apart from the slight \"dogleg\" on the left side looks like an ECM rash and I think we are obligated to treat it as such.    ROS A comprehensive review of systems was performed and was negative other than the problems noted above.    Medications, allergies, and problem list were reviewed and updated    Past Medical History:   Diagnosis Date     Benign Adenomatous Polyp Of The Large Intestine     Created by Conversion      Calculus of gallbladder 9/15/2015     GERD (gastroesophageal reflux disease)      Hematuria      Hyperlipidemia      Insomnia      Internal hemorrhoids      Nephrolithiasis     Created by Conversion [facility]: Feb 13 2008 11:26AM - Puja Hinsonil: on HCTZ      Obesity      Osteoarthritis      Phimosis     Created by Conversion      Recurrent major depression in full remission      Sleep apnea 2001    CPAP     Tinnitus      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 [place] /[facility] on scholarship... 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 3 Kids (1 dtr/2 sons); wife  . 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 Non smoker/non drinker     Family History   Problem Relation Age of Onset     Colon cancer Mother      Colon cancer Brother      Colon cancer Brother      Dementia Sister      Dementia Sister        Current Outpatient Prescriptions   Medication Sig Dispense Refill     carboxymethylcellulose (REFRESH LIQUIGEL) 1 % ophthalmic solution Apply 1 drop to eye 2 (two) times a day.        cholecalciferol, vitamin D3, 1,000 unit tablet Take 2 tablets by mouth daily.        clobetasol (TEMOVATE) 0.05 % external solution   3     fluticasone (FLONASE) 50 mcg/actuation nasal spray 1 spray into each nostril as needed. 16 g 0     glycerin, adult, Supp rectal suppository Apply 1 suppository around the anus daily as needed (constipation). Indications: Bowel Evacuation       ketoconazole (NIZORAL) 2 % shampoo   3     PARoxetine (PAXIL) 20 MG tablet Take 1 tablet (20 mg total) by mouth daily. 90 tablet 0     polyethylene glycol (GLYCOLAX) 17 gram/dose powder Take 17 g by " mouth daily.        psyllium (FIBER, PSYLLIUM HUSK,) 0.52 gram capsule Take 0.52 g by mouth 2 (two) times a day.        sodium chloride (OCEAN) 0.65 % nasal spray 1 spray into each nostril as needed for congestion.       zolpidem (AMBIEN) 10 mg tablet TAKE ONE TABLET BY MOUTH AT BEDTIME AS NEEDED FOR SLEEP 30 tablet 0     doxycycline (VIBRA-TABS) 100 MG tablet Take 1 tablet (100 mg total) by mouth 2 (two) times a day for 10 days. 20 tablet 0     No current facility-administered medications for this visit.        No Known Allergies    Exam  Vitals:    07/03/17 1532   BP: 100/64   Pulse: 60     The patient is well-groomed and well-dressed alert and oriented ×3.   Head: Negative  HEENT: Normal  Extremities: No peripheral edema.  Pulses are normal and symmetrical.  On his left upper arm he has a red rash which is slightly eccentric but is a basically rounded rash with a little bit of a tail off to the left.  This could well represent Lyme disease or it could represent an infected bite of another sort.  Neuro: Gait and mentation normal. Cranial Nerves intact.            Yevgeniy Giles MD

## 2021-06-13 NOTE — PROGRESS NOTES
Assessment and Plan:     1. Cough  XR Chest PA and Lateral   2. Bronchitis  amoxicillin (AMOXIL) 875 MG tablet      This is most likely viral bronchitis but given that he has dark sputum, it could be bacterial. After discussing with the patient, he will continue to use OTC treatment and increase fluid intake, rest, and elevate HOB at night and watch for 3-4 more days. If he is getting worse than better, he will go ahead and fill the prescription for amoxicillin-written/printed Rx given to patient to take with. If he is getting better then he will not fill the prescription. Follow up with PCP if not better in a week. Reviewed the X-ray findings with him.     Subjective:       Gagan Nieto is a 77 y.o. male who presents for evaluation of symptoms of a URI. Symptoms include congestion and cough described as productive of brown and blackish sputum. Onset of symptoms was 1 week ago, and has been gradually worsening since that time. Treatment to date: natural over the counter remedies but not helping. Have to urinate more than usual and no change in coloration and no blood in the urine and no dysuria. No abdominal pain or vomiting but some nausea. Feeling more weak and tired. No known fever but does feel chilled and warm alternating. Appetite down a little. No rash. No ill contacts. Was traveling to [facility] a week ago and was feeling ok there but got worse when he got home.     The following portions of the patient's history were reviewed and updated as appropriate: allergies, current medications, past family history, past medical history, past social history, past surgical history and problem list.    Review of Systems  A 12 point comprehensive review of systems was negative except as noted.     Objective:   /58 (Patient Site: Right Arm, Patient Position: Sitting, Cuff Size: Adult Regular)  Pulse 73  Temp 99.1  F (37.3  C) (Oral)   Resp 18  Wt (!) 240 lb 3.2 oz (109 kg)  SpO2 92%  BMI 37.62 kg/m2    Gen: alert and oriented X3. Looks in No distress  HEENT: PERLLA; EOMI. O/P clear and No erythema. Nose clear with no drainage.   Neck: supple with no LAD. No thyromegaly. No carotid bruits.  Lungs: rhonchi noted throughout lower lung fields without wheezing. Upper fields slightly coarse BS without wheezing. No retractions noted.   CV: RRR without murmur or gallop. Normal pulses. CRT <2 sec  Abd: soft and Normal BS. No tenderness; No distention. No organomegaly.   Ext: without edema; without trauma.   Neuro: CN 2-12 Normal and grossly normal   Skin: No rash: description: NA. Warm and dry.   Psych: normal    CXR-left LL density-fibrosis identified previously that is present-unchanged from previous in 2016. No new infiltrates.     Yoel Araujo MD

## 2021-06-14 NOTE — PROGRESS NOTES
"  Office Visit - Follow Up   Gagan Nieto   80 y.o. male    Date of Visit: 2/2/2021    Chief Complaint   Patient presents with     Knee Pain     right     Labs Only     fasting        Assessment and Plan   1. Plantar warts  He does have a plantar wart and it is tender on his right foot, recommend podiatry evaluation  - Ambulatory referral to Podiatry [facility] (includes [facility] groups)    2. Major depressive disorder, recurrent episode, in full remission (H)  We will continue paroxetine    3. Insomnia, unspecified type  Sleep is been okay, with ongoing sleep hygiene, paroxetine helpful    4. Gastroesophageal reflux disease without esophagitis  Stable with dietary modification    5. Morbid obesity (H)  The following high BMI interventions were performed this visit: encouragement to exercise and lifestyle education regarding diet    Return in about 6 months (around 8/2/2021) for recheck.     History of Present Illness   This 80 y.o. old  comes in for follow-up.  Overall doing well.  Had some issues with right groin and back pain.  Question of some epididymitis on antibiotics also saw orthopedic surgery and got an injection to his right greater trochanter both which seem to be helpful.  X-ray shows mild to moderate arthritis.  Has some degenerative changes of his lumbar spine.  Mood has been stable.  Quite busy with funerals.    Review of Systems: A comprehensive review of systems was negative except as noted.     Medications, Allergies and Problem List   Reviewed, reconciled and updated  Post Discharge Medication Reconciliation Status:      Physical Exam   General Appearance:   No acute distress    /62 (Patient Site: Left Arm, Patient Position: Sitting, Cuff Size: Adult Regular)   Pulse 60   Ht 5' 7\" (1.702 m)   Wt (!) 238 lb (108 kg)   SpO2 97%   BMI 37.28 kg/m      HEENT exam is unremarkable  Cardiovascular regular rate and rhythm no murmur gallop or rub  Pulmonary lungs are " clear to auscultation bilaterally  Neurologic exam is non focal  Psychiatric pleasant, no confusion or agitation        Additional Information   Current Outpatient Medications   Medication Sig Dispense Refill     amoxicillin (AMOXIL) 500 MG capsule Take 500 mg by mouth as needed Take 4 capsules by mouth one hour prior to dental appoinment as directed..       carboxymethylcellulose (REFRESH LIQUIGEL) 1 % ophthalmic solution Administer 1 drop to both eyes 2 (two) times a day.       cholecalciferol, vitamin D3, (CHOLECALCIFEROL) 1,000 unit tablet Take 5 tablets by mouth daily.       fluticasone propionate (FLONASE) 50 mcg/actuation nasal spray 1 spray into each nostril as needed. 48 g 3     OREGANO OIL ORAL Take by mouth.       PARoxetine (PAXIL) 20 MG tablet Take 1 tablet (20 mg total) by mouth daily. 90 tablet 3     polyethylene glycol (GLYCOLAX) 17 gram/dose powder Take 17 g by mouth as needed.       psyllium (FIBER, PSYLLIUM HUSK,) 0.52 gram capsule Take 0.52 g by mouth 2 (two) times a day.        saw palmetto 160 mg cap Take by mouth.       Current Facility-Administered Medications   Medication Dose Route Frequency Provider Last Rate Last Admin     lidocaine 1%-EPINEPHrine 1:100,000 112 mL in sodium chloride 0.9% 1,000 mL (TUMESCENT)  1,000 mL Irrigation Q1H PRN Dimitri Estrada MD         Allergies   Allergen Reactions     Mold      Social History     Tobacco Use     Smoking status: Never Smoker     Smokeless tobacco: Never Used   Substance Use Topics     Alcohol use: No     Drug use: No       Review and/or order of clinical lab tests:  Review and/or order of radiology tests:  Review and/or order of medicine tests:  Discussion of test results with performing physician:  Decision to obtain old records and/or obtain history from someone other than the patient:  Review and summarization of old records and/or obtaining history from someone other than the patient and.or discussion of case with another health care  provider:  Independent visualization of image, tracing or specimen itself:    Time:      Miguelito Hall MD

## 2021-06-14 NOTE — TELEPHONE ENCOUNTER
I left pt a message for pt to call back to reschedule his 01/21/2021 appt it is scheduled at [facility] right now needs to reschedule to [facility] on a different day

## 2021-06-16 NOTE — PROGRESS NOTES
HISTORY OF PRESENT ILLNESS  Patient comes in today for evaluation of ears. He has bilateral perforations and wears hearing aids. He periodically has drainage, typically from the left ear. He reports that he has felt some drainage in the left ear.     REVIEW OF SYSTEMS  Review of Systems: a 10-system review was performed. Pertinent positives are noted in the HPI and on a separate scanned document in the chart.    PMH, PSH, FH and SH has documented in the EHR.      EXAM    CONSTITUTIONAL  General Appearance:  Normal, well developed, well nourished, no obvious distress  Ability to Communicate:  communicates appropriately.    HEAD AND FACE  Appearance and Symmetry:  Normal, no scalp or facial scarring or suspicious lesions.    EYE  Normal external eye, conjunctiva, lids, cornea.     EARS  Clinical speech reception threshold:  Normal, able to hear normal speech.  Auricle:  Normal, Auricles without scars, lesions, masses.  External auditory canal:  Normal, External auditory canal normal.  Tympanic membrane:  Bilateral TM perforations with what appears to be secretions in the left middle ear, consistent with infection.    NOSE (speculum or scope)  Architecture:  Normal, Grossly normal external nasal architecture with no masses or lesions.  Mucosa:  Normal mucosa, No polyps or masses.  Septum:  Normal, Septum non-obstructing.  Turbinates:  Normal, No turbinate abnormalities    ORAL CAVITY AND OROPHARYNX  Lips:  Normal.  Dental and gingiva:  Normal, No obvious dental or gingival disease.  Mucosa:  Normal, Moist mucous membranes.  Tongue:  Normal, Tongue mobile with no mucosal abnormalities  Hard and soft palate:  Normal, Hard and soft palate without cleft or mucosal lesions.  Oral pharynx:  Normal, Posterior pharynx without lesions or remarkable asymmetry.  Saliva:  Normal, Clear saliva.  Masses:  Normal, No palpable masses or pathologically enlarged lymph nodes.    NECK  Masses/lymph nodes:  Normal, No worrisome neck masses  or lymph nodes.  Salivary glands:  Normal, Parotid and submandibular glands.  Trachea and larynx position:  Normal, Trachea and larynx midline.  Thyroid:  Normal, No thyroid abnormality.  Tenderness:  Normal, No cervical tenderness.  Suppleness:  Normal, Neck supple    NEUROLOGICAL  Speech pattern:  Normal, Proasaic    RESPIRATORY  Symmetry and Respiratory effort:  Normal, Symmetric chest movement and expansion with no increased intercostal retractions or use of accessory muscles.     IMPRESSION  1. Bilateral central TM perforations.  2. Left middle ear infection.     RECOMMENDATION  Floxin otic. 5 drops two times a day x 1 week. Follow up if the drainage does not resolve.    Nigel Farley MD

## 2021-06-18 NOTE — PATIENT INSTRUCTIONS - HE
Patient Instructions by Miguelito Hall MD at 7/21/2020 11:00 AM     Author: Miguelito Hall MD Service: -- Author Type: Physician    Filed: 7/21/2020  2:12 PM Encounter Date: 7/21/2020 Status: Signed    : Miguelito Hall MD (Physician)         Patient Education   Signs of Hearing Loss  Hearing loss is a problem shared by many people. In fact, it is one of the most common health conditions, particularly as people age. Most people over age 65 have some hearing loss, and by age 80, almost everyone does. Because hearing loss usually occurs slowly over the years, you may not realize your hearing ability has gotten worse.       Have your hearing checked  Contact your [facility] care provider if you:    Have to strain to hear normal conversation.    Have to watch other peoples faces very carefully to follow what theyre saying.    Need to ask people to repeat what theyve said.    Often misunderstand what people are saying.    Turn the volume of the television or radio up so high that others complain.    Feel that people are mumbling when theyre talking to you.    Find that the effort to hear leaves you feeling tired and irritated.    Notice, when using the phone, that you hear better with 1 ear than the other.    1128-2259 [employer]. [address] [place] [address], [place] [address]. All rights reserved. This information is not intended as a substitute for professional medical care. Always follow your healthcare professional's instructions.           Advance Directive  Patients advance directive was discussed and I am comfortable with the patients wishes.  Patient Education   Personalized Prevention Plan  You are due for the preventive services outlined below.  Your care team is available to assist you in scheduling these services.  If you have already completed any of these items, please share that information with your care team to update in your medical record.  Health Maintenance   Topic Date  Due   ? INFLUENZA VACCINE RULE BASED (1) 08/01/2020   ? FALL RISK ASSESSMENT  10/24/2020   ? MEDICARE ANNUAL WELLNESS VISIT  07/21/2021   ? TD 18+ HE  01/11/2022   ? ADVANCE CARE PLANNING  05/30/2023   ? DEPRESSION ACTION PLAN  Completed   ? PNEUMOCOCCAL IMMUNIZATION 65+ LOW/MEDIUM RISK  Completed   ? ZOSTER VACCINES  Addressed   ? HEPATITIS B VACCINES  Aged Out

## 2021-06-18 NOTE — PROGRESS NOTES
Assessment and Plan:   1 annual wellness visit no new major diagnoses  2 medications reviewed continue same  3 corns and calluses he will see podiatry  4 return to clinic 6 months for general recheck; mild elevation TSH noted not likely significant    Problem List Items Addressed This Visit     Elevated TSH (Chronic)      Other Visit Diagnoses     Healthcare maintenance    -  Primary    Depression        Relevant Medications    PARoxetine (PAXIL) 20 MG tablet    Hyperlipemia        Relevant Orders    Lipid Cascade (Completed)    Comprehensive Metabolic Panel (Completed)    HM2(CBC w/o Differential) (Completed)    Thyroid Stimulating Hormone (TSH) (Completed)    Corns and callosities        Relevant Orders    Ambulatory referral to Podiatry            The patient's current medical problems were reviewed.    I have had an Advance Directives discussion with the patient.  The following health maintenance schedule was reviewed with the patient and provided in printed form in the after visit summary:   Health Maintenance   Topic Date Due     DEPRESSION FOLLOW UP  11/30/2018     FALL RISK ASSESSMENT  05/30/2019     ADVANCE DIRECTIVES DISCUSSED WITH PATIENT  04/25/2021     TD 18+ HE  01/11/2022     PNEUMOCOCCAL POLYSACCHARIDE VACCINE AGE 65 AND OVER  Completed     INFLUENZA VACCINE RULE BASED  Completed     PNEUMOCOCCAL CONJUGATE VACCINE FOR ADULTS (PCV13 OR PREVNAR)  Addressed     ZOSTER VACCINE  Completed        Subjective:   Chief Complaint: Gagan Nieto is an 78 y.o. male here for an Annual Wellness visit.   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  He has some corns and calluses he will see podiatry for this otherwise not much is no    Review of Systems:    Please see above.  The rest of the review of systems are negative for  all systems.    Patient Care Team:  Jean Pierre Ann MD as PCP - General (Internal Medicine)     Patient Active Problem List   Diagnosis     Internal Hemorrhoids     Recurrent Major Depression In Full Remission     Adult Sleep Apnea     Vitamin D Deficiency     Hyperlipidemia     Obesity     Tinnitus     Insomnia     Gastroesophageal reflux disease without esophagitis     Uses hearing aid     Status post right knee replacement     Primary osteoarthritis of knee     Posterior vitreous detachment, both eyes     Elevated TSH     Past Medical History:   Diagnosis Date     Benign Adenomatous Polyp Of The Large Intestine     Created by Conversion      Calculus of gallbladder 9/15/2015     GERD (gastroesophageal reflux disease)      Hematuria      Hyperlipidemia      Insomnia      Internal hemorrhoids      Nephrolithiasis     Created by Conversion [facility] Annotation: Feb 13 2008 11:26AM - Sravan Earle: on HCTZ      Obesity      Osteoarthritis      Phimosis     Created by Conversion      Recurrent major depression in full remission      Sleep apnea 2001    CPAP     Tinnitus      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     Created by Conversion      Uses hearing aid      Varicose Veins     Created by Conversion      Vitamin D deficiency       Past Surgical History:   Procedure Laterality Date     CIRCUMCISION       INGUINAL HERNIA REPAIR Bilateral      LAPAROSCOPIC CHOLECYSTECTOMY N/A 10/8/2015    Procedure: CHOLECYSTECTOMY LAPAROSCOPIC;  Surgeon: Dimitri Estrada MD;  Location: [facility] OR;  Service:      MI TOTAL KNEE ARTHROPLASTY Right 8/31/2016    Procedure: RIGHT KNEE TOTAL ARTHROPLASTY;  Surgeon: Callum Hou MD;  Location: [facility] OR;  Service: Orthopedics      Family History   Problem Relation Age of Onset     Colon cancer Mother      Colon cancer Brother      Colon cancer Brother      Dementia Sister      Dementia Sister       Social History     Social History     Marital status:  "     Spouse name: Nela/     Number of children: 3     Years of education: N/A     Occupational History     [employer]       ordained ; 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. [place] /[employer] on scholarship... 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 3 Kids (1 dtr/2 sons); wife  . He continued as deacon and then ordained into [facility]  into the [facility] order, [employer]igion- . 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 Non smoker/non drinker      Current Outpatient Prescriptions   Medication Sig Dispense Refill     carboxymethylcellulose (REFRESH LIQUIGEL) 1 % ophthalmic solution Apply 1 drop to eye 2 (two) times a day.        carboxymethylcellulose (REFRESH LIQUIGEL) 1 % ophthalmic solution Administer 1 drop to both eyes 2 (two) times a day.       cholecalciferol, vitamin D3, (CHOLECALCIFEROL) 1,000 unit tablet Take 5 tablets by mouth daily.       cholecalciferol, vitamin D3, 1,000 unit tablet Take 2 tablets by mouth daily.        clobetasol (TEMOVATE) 0.05 % external solution   3     fluticasone (FLONASE) 50 mcg/actuation nasal spray 1 spray into each nostril as needed. 48 g 3     ketoconazole (NIZORAL) 2 % shampoo   3     PARoxetine (PAXIL) 20 MG tablet Take 1 tablet (20 mg total) by mouth daily. 90 tablet 3     polyethylene glycol (GLYCOLAX) 17 gram/dose powder Take 17 g by mouth daily.        polyethylene glycol (GLYCOLAX) 17 gram/dose powder Take 17 g by mouth as needed.       sodium chloride (OCEAN) 0.65 % nasal " "spray 1 spray into each nostril as needed for congestion.       tamsulosin (FLOMAX) 0.4 mg Cp24 Take 0.4 mg by mouth as needed.       zolpidem (AMBIEN) 10 mg tablet TAKE ONE TABLET BY MOUTH AT BEDTIME AS NEEDED FOR SLEEP 30 tablet 3     zolpidem (AMBIEN) 10 mg tablet Take 5 mg by mouth daily.       zolpidem (AMBIEN) 10 mg tablet TAKE 1 TABLET AT BEDTIME AS NEEDED FOR SLEEP 30 tablet 1     zolpidem (AMBIEN) 10 mg tablet TAKE 1 TABLET AT BEDTIME AS NEEDED FOR SLEEP 30 tablet 1     glycerin, adult, Supp rectal suppository Apply 1 suppository around the anus daily as needed (constipation). Indications: Bowel Evacuation       psyllium (FIBER, PSYLLIUM HUSK,) 0.52 gram capsule Take 0.52 g by mouth 2 (two) times a day.        No current facility-administered medications for this visit.       Objective:   Vital Signs:   Visit Vitals     /70 (Patient Site: Left Arm, Patient Position: Sitting, Cuff Size: Adult Regular)     Pulse 60     Ht 5' 7\" (1.702 m)     Wt (!) 253 lb 6 oz (114.9 kg)     SpO2 92%     BMI 39.68 kg/m2        VisionScreening:  No exam data present     PHYSICAL EXAM  Physical Exam:  General-very pleasant sincere type of man wearing his previous garb; overweight  VS- see above  HEENT- neg   Neck- no adenopathy/thyromegaly/bruits  Chest- clear   Cor- reg no murmurs/gallops/ectopics  Extremities: no edema, good distal pulses  Neuro- Cr. NN-  intact, alert,   Abdomen- soft non tender, no masses; no organomegaly obese  Skin- no suspicious lesions for malignancy        Assessment Results 5/30/2018   Activities of Daily Living No help needed   Instrumental Activities of Daily Living No help needed   Mini Cog Total Score 3   Some recent data might be hidden     A Mini-Cog score of 0-2 suggests the possibility of dementia, score of 3-5 suggests no dementia    Identified Health Risks:     Patient's advanced directive was discussed and I am comfortable with the patient's wishes.  Recent Results (from the past 240 " hour(s))   Lipid Cascade   Result Value Ref Range    Cholesterol 179 <=199 mg/dL    Triglycerides 62 <=149 mg/dL    HDL Cholesterol 56 >=40 mg/dL    LDL Calculated 111 <=129 mg/dL    Patient Fasting > 8hrs? Yes    Comprehensive Metabolic Panel   Result Value Ref Range    Sodium 141 136 - 145 mmol/L    Potassium 4.0 3.5 - 5.0 mmol/L    Chloride 107 98 - 107 mmol/L    CO2 25 22 - 31 mmol/L    Anion Gap, Calculation 9 5 - 18 mmol/L    Glucose 83 70 - 125 mg/dL    BUN 22 8 - 28 mg/dL    Creatinine 0.91 0.70 - 1.30 mg/dL    GFR MDRD Af Amer >60 >60 mL/min/1.73m2    GFR MDRD Non Af Amer >60 >60 mL/min/1.73m2    Bilirubin, Total 0.8 0.0 - 1.0 mg/dL    Calcium 9.8 8.5 - 10.5 mg/dL    Protein, Total 6.7 6.0 - 8.0 g/dL    Albumin 4.0 3.5 - 5.0 g/dL    Alkaline Phosphatase 64 45 - 120 U/L    AST 25 0 - 40 U/L    ALT 24 0 - 45 U/L   HM2(CBC w/o Differential)   Result Value Ref Range    WBC 4.8 4.0 - 11.0 thou/uL    RBC 5.01 4.40 - 6.20 mill/uL    Hemoglobin 15.3 14.0 - 18.0 g/dL    Hematocrit 44.8 40.0 - 54.0 %    MCV 89 80 - 100 fL    MCH 30.6 27.0 - 34.0 pg    MCHC 34.2 32.0 - 36.0 g/dL    RDW 13.1 11.0 - 14.5 %    Platelets 150 140 - 440 thou/uL    MPV 7.0 7.0 - 10.0 fL   Thyroid Stimulating Hormone (TSH)   Result Value Ref Range    TSH 7.17 (H) 0.30 - 5.00 uIU/mL

## 2021-06-18 NOTE — PATIENT INSTRUCTIONS - HE
"Patient Instructions by Meera Correa LPN at 7/29/2020  8:00 AM     Author: Meera Correa LPN Service: -- Author Type: Licensed Nurse    Filed: 7/29/2020  8:43 AM Encounter Date: 7/29/2020 Status: Signed    : Meera Correa LPN (Licensed Nurse)       We are prescribing some compression stockings for you. I have included different suppliers that should help you get measured and fitting to ensure proper fitting socks. You should wear this socks as much as you can. It is especially important to wear them with long periods of sitting/standing, long car rides or if you will be flying. Compression socks should get refilled every 4-6 months. They do not need to be worn at night while in bed.    If you do a lot of standing it is good to do calf raises to help keep the blood pumping. If you sit a lot at work it is good to get up periodically to walk around. Elevation of the foot of your bed 4-6\" helps the blood return back to where it is needed.    We would like you to follow up in 3 months after wearing socks to see how you are doing.    Varicose Veins      Varicose veins are swollen, enlarged veins most often found in the legs. They are usually blue or purple in color and may bulge, twist, and stand out under the skin.  Normally, veins return blood from the body to the heart. The leg veins have one-way valves that prevent blood from flowing backward in the vein. When the valves are weak or damaged, blood backs up in the veins. This may cause some of the veins to swell and bulge and become varicose veins.  Symptoms  Varicose veins may or may not cause symptoms. If symptoms do occur, they can include:    Legs that feel tired, achy, heavy, or itchy    Leg muscle cramps    Skin changes, such as discoloration, dryness, redness, or rash (in more severe cases, you may also have sores on the skin called venous leg ulcers)  Risk Factors  There are a number of factors that increase the risk for varicose veins. " These can include:    Being a woman    Being older    Sitting or standing for long periods    Being overweight    Being pregnant    Having a family history of varicose veins  Treatment begins with simple self-help measures (see below). If these dont help, there are many procedures that can be done to shrink or remove varicose veins. Your healthcare provider can tell you more about these options, if needed.  Home care    Support or compression stockings will likely be prescribed. If so, be sure to wear them as directed. They may help improve blood flow.    Exercising helps strengthen your leg muscles and improve blood flow. To get the most benefit, choose exercises such as walking, swimming, or cycling. Also try to exercise for at least 30 minutes on most days.    Raising (elevating) your legs lets gravity help blood flow back to the heart. Sit or lie with your feet above heart level a few times throughout the day, or as directed.    Avoid long periods of sitting or standing. Change positions often. Also, move your ankles, toes and knees often. This may also help improve blood flow.    If you are overweight, talk with your healthcare provider about setting up a weight-loss plan. Maintaining a healthy weight can help reduce the strain on your veins. It may also improve symptoms, such as swelling and aching.    If you have dryness and itching, ask your provider about special lotions that can be applied to the skin to help improve symptoms.  Follow-up care  Follow up with your healthcare provider, or as directed. If imaging tests were done, youll be told the results and if there are any new findings that affect your care.  When to seek medical advice  Call your healthcare provider right away if any of these occur:    Sudden, severe leg swelling, pain, or redness    Symptoms worsen, or they dont improve with self-care    Bleeding from any affected veins    Ulcers form on the legs, ankles, or feet    Fever of 100.4 F  (38 C) or higher, or as advised by your provider      Understanding Spider and Varicose Veins  Do you often hide your legs because of the way they look? You may have noticed tiny red or blue bursts (spider veins). Or maybe you have veins that bulge or look twisted (varicose veins). If so, there are treatments that can help  What are the symptoms?  Spider veins or varicose veins may never be a problem. But sometimes they can cause legs to ache or swell. Your legs may also feel heavy and tired, or like theyre burning. These symptoms may be more severe at the end of the day. Prolonged sitting or standing can also make your symptoms worse.  Who gets spider and varicose veins?  Anyone can get spider or varicose veins. But vein problems tend to be hereditary (run in families). Other factors that can affect veins include:    Pregnancy, hormones, and birth control pills    A job where you stand or sit a lot    Extra weight or lack of exercise    Age         Spider veins look like tiny webs on the ankles, legs, and upper thighs.       Ropy, dark blue, red, or flesh-colored varicose veins are most common on the thighs, calves, and feet.    What can be done?  Spider and varicose veins can affect the way you feel about yourself. Talk to your healthcare provider about your concerns. There are treatments that can ease symptoms and make your legs look better.  Your treatment choices  Treatment may include self-care, sclerotherapy (injecting veins with a chemical), surgery, or newer nonsurgical minimally invasive therapies. Spider veins and some varicose veins can be treated with sclerotherapy. Large varicose veins can often be treated with newer minimally invasive procedures and, in rare cases, surgery may be needed.     Please call [facility] to schedule an appointment. If you received a prescription please bring it with you to your appointment. You may call one of the locations below, although some  locations are limited to what they carry.    Office Locations  New Locations  [facility]  Home Medical Equipment  [address] [facility] [address], [place] [address]  [facility] ([facility])  [address], [place] [address]  Phone [phone] /Fax [phone]        [place]   [address]   Medical Equipment [address]/Orthotics and Prosthetics [address]  [place] [address]   Phone: [phone]  Fax [phone]    [facility]  [address]  [place] [address]  Phone: [phone]  Fax: [phone]    [facility]   [address]  [place] [address]  Phone: [phone]  Fax: [phone]    [facility]  [address]  [place] [address]  Phone: [phone]  Fax: [phone]    [facility]  [address]  [place] [address]  Phone: [phone]  Fax: [phone]    [facility]  [address]   [place] [address]   Phone: [phone]  Fax: [phone]    [place]   [address] [place] [address]   Phone: [phone]  Fax: [phone]    [facility]  [address]  [place] [address]    [place] [phone]  [phone]  [place] [phone]  [email or web address]      [facility]   [address]             [address]                 [address]     [place] [address]      [place] [address]         [place], [place] [id number] [phone]  [phone]     [phone]               Fax: [phone]  [email or web address]                                                      [facility]  [address] [place] [address]  [phone]  [phone]  [email or web address]    Parminder Ng  [phone]  [email or web address]    [facility] supply   [phone]    [place] [address] [facility].  [place], [place] [id number] [phone]

## 2021-06-18 NOTE — PATIENT INSTRUCTIONS - HE
Patient Instructions by Dimitri Estrada MD at 10/27/2020 10:40 AM     Author: Dimitri Estrada MD Service: -- Author Type: Physician    Filed: 10/27/2020 10:37 AM Encounter Date: 10/27/2020 Status: Signed    : Dimitri Estrada MD (Physician)         Varicose Vein Pre-Procedure Instructions/Vein Ablation    You are scheduled for a varicose vein treatment on your legs. The following is some helpful information for you, in regards to your treatment.    **Important:  A  will be needed post procedure.  (Unable to use Taxi or Uber).     We will supply a thigh high compression stocking for you. Please bring your compression sock as we only have sizes medium to X-large.    Please be aware, it is not advised to fly within 3 weeks post procedure    Please wear comfortable clothing.  We recommend that you bring a change of under clothes; they may get stained by the cleansing solution.    Feel free to bring a personal music player or a CD to listen to during your procedure.    Take your routine medications as you normally would with exception to blood thinners. Aspirin is ok to continue.    If you take Warfarin, Xarelto, or Eliquis this will need to be HELD prior to procedure according to primary care provider/doctor or cardiology who prescribes this medication.    Please notify us if you take this medication.    It is ok to eat prior to this procedure.    Please allow 1- 2 hours for your appointment.    For any questions regarding your procedure please call   [phone] to speak with the nurse.    If you would like a Good [place] Estimate for your upcoming service/procedure contact Cost of Care Estimates at [phone], advocates are available Monday through Friday 8am - 5pm.    VenaSeal Ablation Codes  29427 for first vein in either leg  02103 for the second vein in the same leg    Please have the following information available:  1. Patient name and date of birth  2. Insurance company, plan name, ID and  group numbers  3. Description of the service/procedure and the associated procedure code numbers, if available. If more than one (1) procedure code, indicate which will be the primary procedure code.   4. The facility where the service/procedure will be performed.  5. The name of the physician involved with the service/procedure.  6. Appointment date of service.  7. Telephone number to call with the information.  Varicose Veins    UNDERSTAND  Varicose veins may be a sign of something more severe-venous reflux disease.  Healthy leg veins have valves that keep blood flowing to the heart. Venous reflux disease develops when the valves stop working properly and allow blood to flow backward (i.e., reflux) and pool in the lower leg veins.     If venous reflux disease is left untreated, symptoms can worsen over time. Your doctor can help you understand if you have this condition.     Superficial venous reflux disease may cause the following signs and symptoms in your legs:  Varicose veins  Aching  Swelling  Cramping  Heaviness or tiredness  Itching  Restlessness  Open skin sores    Treat  Superficial venous reflux disease treatment aims to reduce or stop the backward flow of blood. The following may be prescribed to treat your superficial venous reflux disease. Your doctor can help you decide which treatment is best for you:       Compression stockings     Removing diseased vein     Closing diseased vein (through thermal or non-thermal treatment)     VenaSeal? Closure System  One non-thermal treatment option is the VenaSeal? Closure System, which improves blood flow and relieves symptoms by sealing-or closing-the diseased vein. The system delivers a small amount of a specially formulated medical adhesive to the diseased vein. The adhesive permanently seals the vein and blood is rerouted through nearby healthy veins.          Demonstrated Outcomes  The VenaSeal? closure system is a safe and effective treatment, providing  significant improvements in quality of life.1,2,3    In a [place] study , the VenaSeal? system and thermal radiofrequency ablation treatments had similar clinical results at 3 years; 94.4% closure for the VenaSeal? system and 91.9% for thermal energy.     Side effects were minor and infrequent.     The most common side effect was phlebitis (i.e., inflammation of a vein), and it typically occurred within the first 30 days after the procedure. Phlebitis is a commonly reported side effect in all vein treatments. Phlebitis occurred more frequently in VenaSeal? system-treated subjects than in RFA-treated subjects, though the difference was not statistically significant.     Please see the Potential risks section for more information.    FAQ  What can I expect of the VenaSeal? procedure?    Before the Procedure:  You will have an ultrasound imaging exam of the leg that is to be treated. This exam is important for assessing the diseased superficial vein and planning the procedure.    During the Procedure:  Your doctor can discuss the procedure with you. A brief summary of what to expect is below:  You may feel some minor pain or stinging with a needle stick to numb the site where the doctor will access your vein.  Once the area is numb, your doctor will insert the catheter (i.e., a small hollow tube) into your leg. You may feel some pressure from the placement of the catheter.  The catheter will be placed in specific areas along the diseased vein to deliver small amounts of the medical adhesive. You may feel some mild sensation of pulling. Ultrasound will be used during the procedure to guide and position the catheter.  After treatment, the catheter is removed and a small adhesive bandage placed over the puncture site.         After the Procedure:  You will be taken to the recovery area to rest.  Your doctor will recommend follow-up care as needed.    Commonly Asked Questions  When will my symptoms improve?  Symptoms are caused  by the diseased superficial vein. Thus, symptoms may improve as soon as the diseased vein is closed.  When can I return to normal activity?  The VenaSeal procedure is designed to reduce recovery time. Many patients return to normal activity immediately after the procedure. Your doctor can help you determine when you can return to normal activity.  Is the VenaSeal procedure painful?  Most patients feel little, if any, pain during the outpatient procedure.1  Is there bruising after the VenaSeal? procedure?  Most patients report [id number] bruising after the VenaSeal procedure.1  What happens to the VenaSeal? adhesive?  Only a very small amount of VenaSeal? adhesive is used to close the vein. Your body will naturally create scar tissue around the adhesive over time to keep the vessel permanently closed.  How does the VenaSeal? procedure differ from thermal energy procedures?  The VenaSeal? procedure uses an adhesive to close the superficial vein. Thermal energy procedures use heat to close the vein. The intense heat requires a large volume of numbing medicine, which is injected through many needle sticks. The injections may cause pain and bruising after the procedure.  Is the VenaSeal procedure covered by insurance?  As with any procedure, insurance coverage may vary. For more information, please contact your insurance provider.    The VenaSeal? procedure may not be right for everyone  Your doctor can help you decide if the VenaSeal? procedure is right for you. The VenaSeal? procedure is contraindicated for individuals with any of the following conditions:  Thrombophlebitis migraines (i.e., inflammation of a vein caused by a slow moving blood clot)  Acute superficial thrombophlebitis (i.e., inflammation of a vein caused by a blood clot)  Previous hypersensitivity reactions to the VenaSeal? adhesive or cyanoacrylates  Acute sepsis (i.e., whole-body inflammation caused by an immune response to an  infection)    Potential risks  The VenaSeal? procedure is minimally invasive and catheter-based. As such, it may involve the following risks. Your doctor can help you understand these risks.  Allergic reaction to the VenaSeal? adhesive  Arteriovenous fistula (i.e., an abnormal connection between an artery and a vein)  Bleeding from the access site  Deep vein thrombosis (i.e., blood clot in the deep vein system)  Edema (i.e., swelling) in the treated leg  Embolization (i.e., blockage of a vein or artery), including pulmonary embolism (i.e., blockage of an artery in the lungs)  Hematoma (i.e., the collection of blood outside of a vessel)  Hyperpigmentation (i.e., darkening of the skin)  Infection at the access site  Non-specific mild inflammation of the cutaneous and subcutaneous tissue  Pain  Paresthesia (i.e., a feeling of tingling, pricking, numbness or burning)  Phlebitis (i.e., inflammation of a vein)  Superficial thrombophlebitis (i.e., inflammation of a vein caused by a blood clot)  Urticaria (i.e., hives) or ulceration may occur at the site of injection  Vascular rupture and perforation  Visible scarring

## 2021-06-19 NOTE — PROGRESS NOTES
Admission History & Physical  Gagan Nieto, 1940, [id number]    [facility]  Jean Pierre Ann MD, [phone]    Extended Emergency Contact Information  Primary Emergency Contact: Karri Nieto   [facility]  Home Phone: [phone]  Work Phone: [phone]  Relation: Child  Secondary Emergency Contact: Steven Nieto   [facility]  Home Phone: [phone]  Mobile Phone: [phone]  Relation: Child     Assessment and Plan:   Assessment: Onychomycosis, onychauxis, keratomas, plantarflexed metatarsals  Plan: Trimmed dystrophic nails both feet.  Trim keratomas both feet.  Recommended orthotics.  Active Problems:    * No active hospital problems. *      Chief Complaint:  Painful calluses both feet     HPI:    Gagan Nieto is a 78 y.o. old male who presented to the clinic today complaining of painful calluses on the bottom of both feet.  The patient stated he has had this problem for several years.  The pain is aggravated with weightbearing and ambulation.  The patient is a hospital .  He stands on his feet on hard surfaces for several hours.  This also aggravates his condition.  He stated he is unable to treat his nails.  He has pain on the ball of both feet.  The pain can be quite severe.  The patient was told previously that he had warts on the bottom of his feet so he was unable to have a pedicure.  He denies any other previous treatment.  History is provided by patient    Medical History  Active Ambulatory (Non-Hospital) Problems    Diagnosis     Elevated TSH     Status post right knee replacement     Primary osteoarthritis of knee     Uses hearing aid     Gastroesophageal reflux disease without esophagitis     Posterior vitreous detachment, both eyes     Internal Hemorrhoids     Recurrent Major Depression In Full Remission     Adult Sleep Apnea     Vitamin D Deficiency     Hyperlipidemia     Obesity     Tinnitus     Insomnia     Past Medical  History:   Diagnosis Date     Benign Adenomatous Polyp Of The Large Intestine      Calculus of gallbladder 9/15/2015     GERD (gastroesophageal reflux disease)      Hematuria      Hyperlipidemia      Insomnia      Internal hemorrhoids      Nephrolithiasis      Obesity      Osteoarthritis      Phimosis      Recurrent major depression in full remission (H)      Sleep apnea 2001     Tinnitus      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      Uses hearing aid      Varicose Veins      Vitamin D deficiency      Patient Active Problem List    Diagnosis Date Noted     Elevated TSH 05/31/2018     Status post right knee replacement 08/31/2016     Primary osteoarthritis of knee 08/31/2016     Uses hearing aid 08/23/2016     Gastroesophageal reflux disease without esophagitis 09/15/2015     Posterior vitreous detachment, both eyes 02/25/2015     Internal Hemorrhoids      Recurrent Major Depression In Full Remission      Adult Sleep Apnea      Vitamin D Deficiency      Hyperlipidemia      Obesity      Tinnitus      Insomnia      Surgical History  He  has a past surgical history that includes Circumcision; Inguinal hernia repair (Bilateral); Laparoscopic cholecystectomy (N/A, 10/8/2015); and pr total knee arthroplasty (Right, 8/31/2016).   Past Surgical History:   Procedure Laterality Date     CIRCUMCISION       INGUINAL HERNIA REPAIR Bilateral      LAPAROSCOPIC CHOLECYSTECTOMY N/A 10/8/2015    Procedure: CHOLECYSTECTOMY LAPAROSCOPIC;  Surgeon: Dimitri Estrada MD;  Location: [facility];  Service:      IN TOTAL KNEE ARTHROPLASTY Right 8/31/2016    Procedure: RIGHT KNEE TOTAL ARTHROPLASTY;  Surgeon: Callum Hou MD;  Location: [facility] OR;  Service: Orthopedics    Social History  Reviewed, and he  reports that he has never smoked. He has never used smokeless tobacco. He reports that he does not drink alcohol or use illicit drugs.  Social History   Substance Use Topics     Smoking status: Never Smoker      "Smokeless tobacco: Never Used     Alcohol use No      Allergies  No Known Allergies Family History  Reviewed, and family history includes Colon cancer in his brother, brother, and mother; Dementia in his sister and sister.   Psychosocial Needs  Social History     Social History Narrative    [place] up [place] [address] [place] [address]/[facility]. [place] /[employer] on scholarship... 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 3 Kids (1 dtr/2 sons); wife  . 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 Non smoker/non drinker     Additional psychosocial needs reviewed per nursing assessment.       Prior to Admission Medications     (Not in a hospital admission)        Review of Systems - Negative     /76  Pulse 64  Temp 98.2  F (36.8  C)  Resp 16  SpO2 97%    Objective findings: General: The patient is alert and in no acute distress      Integument: Nails bilateral feet are elongated and slightly thickened.Skin bilateral feet warm supple and intact.  There are small hyperkeratotic nucleated lesions on the plantar aspect both feet.      Vascular: DP and PT pulses +2/4 bilateral feet.  Capillary refill less than 2 seconds bilateral feet.      Neurologic: Negative clonus, negative Babinski bilateral feet.      Musculoskeletal: Range of motion within normal limits bilateral feet.  Muscle power +5/5 bilaterally in all compartments.  There is pain on palpation of the subsecond, third and fourth metatarsal his both feet.  .      Assessment:   Onychomycosis, onychauxis, keratoma, plantarflexed metatarsals        Plan: I trimmed dystrophic nails 1 through 5 both feet.  I also debrided keratomas on the plantar aspect both feet.  I have recommended orthotics to treat the patient's painful plantarflexed " metatarsals.

## 2021-06-19 NOTE — LETTER
Letter by Miguelito Hall MD at      Author: Miguelito Hall MD Service: -- Author Type: --    Filed:  Encounter Date: 8/28/2019 Status: (Other)         Gagan Nieto  [address] [place] [address]             August 28, 2019         Dear Mr. Nieto,    Below are the results from your recent visit:    Resulted Orders   Lipid Cascade   Result Value Ref Range    Cholesterol 179 <=199 mg/dL    Triglycerides 69 <=149 mg/dL    HDL Cholesterol 56 >=40 mg/dL    LDL Calculated 109 <=129 mg/dL    Patient Fasting > 8hrs? Yes    Glucose   Result Value Ref Range    Glucose 86 70 - 125 mg/dL    Patient Fasting > 8hrs? Yes     Narrative    Fasting Glucose reference range is 70-99 mg/dL per  American Diabetes Association (ADA) guidelines.       Labs look okay, excellent    Please call with questions or contact us using [email or web address].    Sincerely,        Electronically signed by Miguelito Hall MD

## 2021-06-20 NOTE — LETTER
Letter by Miguelito Hall MD at      Author: Miguelito Hall MD Service: -- Author Type: --    Filed:  Encounter Date: 7/21/2020 Status: (Other)         Gagan Nieto  [address]  [place] [address]     July 21, 2020         Dear Mr. Nieto,    Below are the results from your recent visit:    Resulted Orders   Electrocardiogram Perform and Read   Result Value Ref Range    SYSTOLIC BLOOD PRESSURE      DIASTOLIC BLOOD PRESSURE      VENTRICULAR RATE 56 BPM    ATRIAL RATE 56 BPM    P-R INTERVAL 348 ms    QRS DURATION 96 ms    Q-T INTERVAL 424 ms    QTC CALCULATION (BEZET) 409 ms    P Axis 34 degrees    R AXIS -22 degrees    T AXIS 6 degrees    MUSE DIAGNOSIS       Sinus bradycardia with sinus arrhythmia with 1st degree A-V block  Otherwise normal ECG  When compared with ECG of 12-APR-2017 09:29,  No significant change was found  Confirmed by ADALGISA DE LA GARZA, LES LOC:JN ([id number]) on 7/21/2020 4:07:47 PM         Labs and EKG look ok, excellent    Please call with questions or contact us using [employer].    Sincerely,        Electronically signed by Miguelito Hall MD

## 2021-06-20 NOTE — LETTER
Letter by Miguelito Hall MD at      Author: Miguelito Hall MD Service: -- Author Type: --    Filed:  Encounter Date: 9/4/2020 Status: (Other)         Gagan Willsjodee  [address] [place] [address]     September 4, 2020     Dear Fr. Nieto,    Below are the results from your recent visit:    Resulted Orders   Urinalysis-UC if Indicated   Result Value Ref Range    Color, UA Yellow Colorless, Yellow, Straw, Light Yellow    Clarity, UA Clear Clear    Glucose, UA Negative Negative    Bilirubin, UA Negative Negative    Ketones, UA Negative Negative    Specific Gravity, UA 1.025 1.005 - 1.030    Blood, UA Negative Negative    pH, UA 5.5 5.0 - 8.0    Protein, UA Negative Negative mg/dL    Urobilinogen, UA 0.2 E.U./dL 0.2 E.U./dL, 1.0 E.U./dL    Nitrite, UA Negative Negative    Leukocytes, UA Negative Negative    Narrative    Microscopic not indicated  UC not indicated   White Blood Count (WBC)   Result Value Ref Range    WBC 4.6 4.0 - 11.0 thou/uL   C-Reactive Protein   Result Value Ref Range    CRP 0.3 0.0 - 0.8 mg/dL     All of your labs look okay, excellent.  If you are pain continues after completion of the antibiotic please let me know.    Please call with questions or contact us using [email or web address].    Sincerely,      Electronically signed by Miguelito Hall MD

## 2021-06-21 NOTE — PROGRESS NOTES
Subjective findings: The patient return to the clinic today complaining of painful calluses and long thick nails both feet.    Objective findings:Nails bilateral feet are elongated and slightly thickened.Skin bilateral feet warm supple and intact.  There are small hyperkeratotic nucleated lesions on the plantar aspect of the left foot.   DP and PT pulses +2/4 bilateral feet.  Capillary refill less than 2 seconds bilateral feet.  Negative clonus, negative Babinski bilateral feet.  Range of motion within normal limits bilateral feet.  Muscle power +5/5 bilaterally in all compartments.  There is pain on palpation of the subsecond, third and fourth metatarsal his both feet.  .    Assessment:   Onychomycosis, onychauxis, keratoma        Plan: I trimmed dystrophic nails 1 through 5 both feet.  I also debrided keratomas on the plantar aspect left foot.

## 2021-06-21 NOTE — LETTER
Letter by Dimitri Estrada MD at      Author: Dimitri Estrada MD Service: -- Author Type: --    Filed:  Encounter Date: 11/20/2020 Status: (Other)         11/20/20      Gagan Nieto  [address] [place] [address]    Dear Gagan,    As a valued [facility] patient, your healthcare needs are our priority. Our clinic records indicate we have attempted to contact you to schedule a venaseal procedure with Dr. Dimitri Estrada, but we have not heard back from you. If you wish to schedule your appointment please contact our office at your convenience. If you have already made an appointment, please disregard this letter. We can be reached at: [phone]    Sincerely,    [facility]

## 2021-06-22 NOTE — PROGRESS NOTES
Gagan IGNA Nieto  1940      Assessment and Plan:  1 spells of weakness 2 of them were postprandial 1 not workup so far negative including CTA brain vessels and Holter monitor will continue to monitor if he has more episodes we will need to get cardiology involved  Reassurance for now  2 chronic Ambien use I like to get him off this is going to cut down to 2.5 mg at bedtime he was taking 5 I gave him a new prescription for 5 mg tablets to break in half  3 controlled substance agreement signed for Ambien  4 he will follow-up with Dr. Maulik Hanson for ongoing primary care after my departure in March from the clinic      Chief Complaint: Follow-up dizzy spells    Visit diagnoses:    1. Dizzy spells     2. Insomnia  zolpidem (AMBIEN) 5 MG tablet   3. Recurrent Major Depression In Full Remission     4. Insomnia, unspecified type     5. Other hyperlipidemia     6. Controlled substance agreement signed       Patient Active Problem List   Diagnosis     Internal Hemorrhoids     Recurrent Major Depression In Full Remission     Adult Sleep Apnea     Vitamin D Deficiency     Other hyperlipidemia     Obesity     Tinnitus     Insomnia     Gastroesophageal reflux disease without esophagitis     Uses hearing aid     Status post right knee replacement     Primary osteoarthritis of knee     Posterior vitreous detachment, both eyes     Elevated TSH     Controlled substance agreement signed     Past Medical History:   Diagnosis Date     Benign Adenomatous Polyp Of The Large Intestine     Created by Conversion      Calculus of gallbladder 9/15/2015     GERD (gastroesophageal reflux disease)      Hematuria      Hyperlipidemia      Insomnia      Internal hemorrhoids      Nephrolithiasis     Created by Conversion [facility] Annotation: Feb 13 2008 11:26EMILE - Puja Hinsonil: on HCTZ      Obesity      Osteoarthritis      Phimosis     Created by Conversion      Recurrent major depression in full remission (H)      Sleep apnea 2001     CPAP     Tinnitus      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     Created by Conversion      Uses hearing aid      Varicose Veins     Created by Conversion      Vitamin D deficiency      Past Surgical History:   Procedure Laterality Date     CIRCUMCISION       INGUINAL HERNIA REPAIR Bilateral      LAPAROSCOPIC CHOLECYSTECTOMY N/A 10/8/2015    Procedure: CHOLECYSTECTOMY LAPAROSCOPIC;  Surgeon: Dimitri Estrada MD;  Location: [facility];  Service:      IL TOTAL KNEE ARTHROPLASTY Right 2016    Procedure: RIGHT KNEE TOTAL ARTHROPLASTY;  Surgeon: Callum Hou MD;  Location: [facility];  Service: Orthopedics     Family History   Problem Relation Age of Onset     Colon cancer Mother      Colon cancer Brother      Colon cancer Brother      Dementia Sister      Dementia Sister      Social History     Socioeconomic History     Marital status:      Spouse name: Nela/     Number of children: 3     Years of education: Not on file     Highest education level: Not on file   Social Needs     Financial resource strain: Not on file     Food insecurity - worry: Not on file     Food insecurity - inability: Not on file     Transportation needs - medical: Not on file     Transportation needs - non-medical: Not on file   Occupational History     Occupation: [employer]      Comment: ordained ; 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 [place] /[facility] on scholarship... 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 3 Kids (1 dtr/2 sons); wife  . He continued  "as deacon and then ordained into [facility]  into the [facility] order, [employer]- 2000. 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 Non smoker/non drinker         Meds:  Current Outpatient Medications   Medication Sig Dispense Refill     amoxicillin (AMOXIL) 500 MG capsule Take 500 mg by mouth as needed Take 4 capsules by mouth one hour prior to dental appoinment as directed..       fluticasone (FLONASE) 50 mcg/actuation nasal spray 1 spray into each nostril as needed. 48 g 3     OREGANO OIL ORAL Take by mouth.       PARoxetine (PAXIL) 20 MG tablet Take 1 tablet (20 mg total) by mouth daily. 90 tablet 3     zolpidem (AMBIEN) 5 MG tablet Take 0.5 tablets (2.5 mg total) by mouth at bedtime as needed for sleep (minimize use). 30 tablet 0     carboxymethylcellulose (REFRESH LIQUIGEL) 1 % ophthalmic solution Administer 1 drop to both eyes 2 (two) times a day.       cholecalciferol, vitamin D3, (CHOLECALCIFEROL) 1,000 unit tablet Take 5 tablets by mouth daily.       clobetasol (TEMOVATE) 0.05 % external solution   3     glycerin, adult, Supp rectal suppository Apply 1 suppository around the anus daily as needed (constipation). Indications: Bowel Evacuation       ketoconazole (NIZORAL) 2 % shampoo   3     polyethylene glycol (GLYCOLAX) 17 gram/dose powder Take 17 g by mouth as needed.       psyllium (FIBER, PSYLLIUM HUSK,) 0.52 gram capsule Take 0.52 g by mouth 2 (two) times a day.        sodium chloride (OCEAN) 0.65 % nasal spray 1 spray into each nostril as needed for congestion.       tamsulosin (FLOMAX) 0.4 mg Cp24 Take 0.4 mg by mouth as needed.       No current facility-administered medications for this visit.        No Known Allergies    ROS: complete review of symptoms otherwise negative except as noted below    S: Father Bruce is doing fairly well he worries about things for sure and he is not had any more spells and that is reassuring " "he is very reassured by the CT scan of his brain and Holter monitor to the episodes were after he ate to the may be some postprandial near syncope but one episode was not in any case he is okay now no new medications he wants to get down on the Ambien which is a very good idea that may contribute to neurologic symptoms as we discussed take only 2.5 mg or one half of the 5 mg at bedtime     O:   Vitals:    12/13/18 1027   BP: 120/60   Patient Site: Left Arm   Patient Position: Sitting   Cuff Size: Adult Large   Pulse: (!) 59   SpO2: 98%   Weight: (!) 250 lb 3.2 oz (113.5 kg)   Height: 5' 7\" (1.702 m)       Physical Exam:  General-  VS- see above  HEENT- neg   Neck- no adenopathy/thyromegaly/bruits  Chest- clear   Cor- reg no murmurs/gallops/ectopics  Extremities: no edema, good distal pulses  Neuro- Cr. NN-  intact, alert,   : -      HOLTER MONITOR  CONCLUSION:  Mild nocturnal bradycardia noted with short pauses associated with  nonconducted atrial premature beats.  First degree AV block noted throughout the  monitoring period.           Jean Pierre Ann MD    HEAD CTA:   1.  No branch vessel occlusion, high-grade stenosis, aneurysm, or high flow vascular malformation involving proximal [place] vessels.     NECK CTA:  1.  No significant stenosis in the neck vessels based on NASCET criteria.  2.  No evidence for dissection      Jean Pierre Ann MD                  "

## 2021-06-22 NOTE — PROGRESS NOTES
Gagan FUENTES Kaiasuryajodee  1940      Assessment and Plan:  1. 2 discrete spells of dizziness with listing to left with nausea/without pain; no pain; one post prandial the other not  Plan: start with CTA brain, then holter ; no loss of consciousness, gets warning and resolved post few minutes  2. BPH- d/c flomax entirely which he has not been using anyhow, apparently   3 chronic anxiety/insomnia- decrease ambien 2.5 mg and d/c entirely if able  4. RTC 1-2 weeks to review test      Chief Complaint: spells    Visit diagnoses:    1. Near syncope  HM2(CBC w/o Differential)    Basic Metabolic Panel    CTA Head and Neck   2. Benign prostatic hyperplasia with urinary frequency  Urinalysis-UC if Indicated       Meds:  Current Outpatient Medications   Medication Sig Dispense Refill     carboxymethylcellulose (REFRESH LIQUIGEL) 1 % ophthalmic solution Administer 1 drop to both eyes 2 (two) times a day.       cholecalciferol, vitamin D3, (CHOLECALCIFEROL) 1,000 unit tablet Take 5 tablets by mouth daily.       fluticasone (FLONASE) 50 mcg/actuation nasal spray 1 spray into each nostril as needed. 48 g 3     PARoxetine (PAXIL) 20 MG tablet Take 1 tablet (20 mg total) by mouth daily. 90 tablet 3     polyethylene glycol (GLYCOLAX) 17 gram/dose powder Take 17 g by mouth as needed.       sodium chloride (OCEAN) 0.65 % nasal spray 1 spray into each nostril as needed for congestion.       zolpidem (AMBIEN) 10 mg tablet Take 1 tablet (10 mg total) by mouth at bedtime as needed for sleep. 30 tablet 1     cholecalciferol, vitamin D3, 1,000 unit tablet Take 2 tablets by mouth daily.        clobetasol (TEMOVATE) 0.05 % external solution   3     glycerin, adult, Supp rectal suppository Apply 1 suppository around the anus daily as needed (constipation). Indications: Bowel Evacuation       ketoconazole (NIZORAL) 2 % shampoo   3     psyllium (FIBER, PSYLLIUM HUSK,) 0.52 gram capsule Take 0.52 g by mouth 2 (two) times a day.        tamsulosin  (FLOMAX) 0.4 mg Cp24 Take 0.4 mg by mouth as needed.       zolpidem (AMBIEN) 10 mg tablet TAKE 1 TABLET AT BEDTIME AS NEEDED FOR SLEEP 30 tablet 1     No current facility-administered medications for this visit.        No Known Allergies    ROS: complete review of symptoms otherwise negative except as noted below    S: Short interval follow-up because of at least 2 spells.  He was walking in his apartment and felt dizzy felt somewhat nauseated no sweating no pain listed to the left he had a very similar episode again 1 of the episodes was postprandial on one time he was sweating no vomiting no chest or arm pain or shortness of breath  No new medications but he does take Ambien 5 mg every night and would like to get him off of that he cuts a 10 mg pill in half and I suggest he cut it in a quarter and then hopefully we can get him off it entirely.  Flomax is on his list but he tells me he has not been using it that certainly could contribute to dizziness       O:   Vitals:    12/05/18 0954   BP: 108/68   Patient Site: Left Arm   Patient Position: Sitting   Cuff Size: Adult Large   Pulse: (!) 59   SpO2: 96%   Weight: (!) 251 lb 9.6 oz (114.1 kg)       Physical Exam:  General-  VS- see above  HEENT- neg   Neck- no adenopathy/thyromegaly/bruits  Chest- clear   Cor- reg no murmurs/gallops/ectopics  Extremities: no edema, good distal pulses  Neuro- Cr. NN-  intact, alert, anxious man seems to need more than the usual amount of instructions on the plan etc.; no evident dementia I suspect he is just anxious          Jean Pierre Ann MD        Recent Results (from the past 240 hour(s))   HM2(CBC w/o Differential)   Result Value Ref Range    WBC 4.2 4.0 - 11.0 thou/uL    RBC 4.88 4.40 - 6.20 mill/uL    Hemoglobin 15.0 14.0 - 18.0 g/dL    Hematocrit 44.1 40.0 - 54.0 %    MCV 91 80 - 100 fL    MCH 30.7 27.0 - 34.0 pg    MCHC 33.9 32.0 - 36.0 g/dL    RDW 12.8 11.0 - 14.5 %    Platelets 164 140 - 440 thou/uL    MPV 7.4 7.0 - 10.0  fL   Urinalysis-UC if Indicated   Result Value Ref Range    Color, UA Yellow Colorless, Yellow, Straw, Light Yellow    Clarity, UA Clear Clear    Glucose, UA Negative Negative    Bilirubin, UA Negative Negative    Ketones, UA Negative Negative    Specific Gravity, UA 1.025 1.005 - 1.030    Blood, UA Negative Negative    pH, UA 6.5 5.0 - 8.0    Protein, UA Negative Negative mg/dL    Urobilinogen, UA 0.2 E.U./dL 0.2 E.U./dL, 1.0 E.U./dL    Nitrite, UA Negative Negative    Leukocytes, UA Negative Negative

## 2021-06-24 NOTE — TELEPHONE ENCOUNTER
Tell Fr. Barrera No problem on missing today.  I'd like him to see Dr Grace Hanson and he should make appt to see her in the near future. I have no capacity for routine appts anymore these next 2 weeks  and only are seeing my patients who are sick.   Help him get into see Dr Hanson- he has seen her before and Iiked her as I recall

## 2021-06-24 NOTE — TELEPHONE ENCOUNTER
PATIENT CALLED BACK - PT WILL SEE DR CRYSTAL CAMPBELL AT THE [facility] - HE LIVES ONLY ABOUT 3-4 BLOCKS FROM THERE -    NO NEED TO CALL HIM BACK

## 2021-06-24 NOTE — PROGRESS NOTES
"  Office Visit - New Patient   Gagan Nieto   78 y.o.  male    Date of visit: 2019  Physician: Miguelito Hall MD     Assessment and Plan   1. TERE on CPAP 8cm  Continue CPAP    2. Recurrent Major Depression In Full Remission  Doing well on paroxetine    3. Insomnia, unspecified type  Very low-dose Ambien 1-3 mg at night okay to continue discussed risk benefit    4. Morbid obesity (H)  The following high BMI interventions were performed this visit: encouragement to exercise and lifestyle education regarding diet    Return in about 6 months (around 2019) for recheck.     Chief Complaint   Chief Complaint   Patient presents with     [facility]     new ptreyna pt         Patient Profile   Social History     Social History Narrative    Grew up [place] [address] [place] [address]/[facility]. [facility] on scholarship... 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 Six grand kids. Non smoker/non drinker.          Past Medical History   Patient Active Problem List   Diagnosis     Recurrent Major Depression In Full Remission     TERE on CPAP 8cm     Obesity     Insomnia     Gastroesophageal reflux disease without esophagitis     Uses hearing aid     Status post right knee replacement     Posterior vitreous detachment, both eyes     Controlled substance agreement signed     Obesity (BMI 35.0-39.9) with comorbidity (H)       Past Surgical History  He has a past surgical history that includes Circumcision; Inguinal hernia repair (Bilateral); Laparoscopic cholecystectomy (N/A, 10/8/2015); and pr total knee arthroplasty (Right, 2016).     History of Present Illness   This 78 y.o. old  " comes in to establish care.  His medical history is reviewed, electronic medical records obtained reflectance no.  Generally no concerns.  Reviewed his history of insomnia, anxiety, CPAP, hearing issues, vision issues, needs cataract surgery.  No acute issues    Review of Systems: A comprehensive review of systems was negative except as noted.     Medications and Allergies   Current Outpatient Medications   Medication Sig Dispense Refill     carboxymethylcellulose (REFRESH LIQUIGEL) 1 % ophthalmic solution Administer 1 drop to both eyes 2 (two) times a day.       cholecalciferol, vitamin D3, (CHOLECALCIFEROL) 1,000 unit tablet Take 5 tablets by mouth daily.       fluticasone (FLONASE) 50 mcg/actuation nasal spray 1 spray into each nostril as needed. 48 g 3     OREGANO OIL ORAL Take by mouth.       PARoxetine (PAXIL) 20 MG tablet Take 1 tablet (20 mg total) by mouth daily. 90 tablet 3     polyethylene glycol (GLYCOLAX) 17 gram/dose powder Take 17 g by mouth as needed.       psyllium (FIBER, PSYLLIUM HUSK,) 0.52 gram capsule Take 0.52 g by mouth 2 (two) times a day.        zolpidem (AMBIEN) 5 MG tablet Take 0.5 tablets (2.5 mg total) by mouth at bedtime as needed for sleep (minimize use). 30 tablet 0     amoxicillin (AMOXIL) 500 MG capsule Take 500 mg by mouth as needed Take 4 capsules by mouth one hour prior to dental appoinment as directed..       sodium chloride (OCEAN) 0.65 % nasal spray 1 spray into each nostril as needed for congestion.       No current facility-administered medications for this visit.      No Known Allergies     Family and Social History   Family History   Problem Relation Age of Onset     Colon cancer Mother      Colon cancer Brother      Colon cancer Brother      Dementia Sister      Dementia Sister      No Medical Problems Daughter      No Medical Problems Son      No Medical Problems Son         Social History     Tobacco Use     Smoking status: Never Smoker     Smokeless tobacco: Never  Used   Substance Use Topics     Alcohol use: No     Drug use: No        Physical Exam   General Appearance:   No acute distress    /68 (Patient Site: Right Arm, Patient Position: Sitting, Cuff Size: Adult Regular)   Pulse 80   Resp 16   Wt (!) 250 lb 12 oz (113.7 kg)   SpO2 98%   BMI 39.27 kg/m      EYES: Eyelids, conjunctiva, and sclera were normal. Pupils were normal. Cornea, iris, and lens were normal bilaterally.  HEAD, EARS, NOSE, MOUTH, AND THROAT: Head and face were normal. Hearing was normal to voice and the ears were normal to external exam. Nose appearance was normal and there was no discharge. Oropharynx was normal.  NECK: Neck appearance was normal. There were no neck masses and the thyroid was not enlarged.  RESPIRATORY: Breathing pattern was normal and the chest moved symmetrically.  Percussion/auscultatory percussion was normal.  Lung sounds were normal and there were no abnormal sounds.  CARDIOVASCULAR: Heart rate and rhythm were normal.  S1 and S2 were normal and there were no extra sounds or murmurs. Peripheral pulses in arms and legs were normal.  Jugular venous pressure was normal.  There was no peripheral edema.  GASTROINTESTINAL: The abdomen was normal in contour.  Bowel sounds were present.  Percussion detected no organ enlargement or tenderness.  Palpation detected no tenderness, mass, or enlarged organs.   MUSCULOSKELETAL: Skeletal configuration was normal and muscle mass was normal for age. Joint appearance was overall normal.  LYMPHATIC: There were no enlarged nodes.  SKIN/HAIR/NAILS: Skin color was normal.  There were no skin lesions.  Hair and nails were normal.  NEUROLOGIC: The patient was alert and oriented to person, place, time, and circumstance. Speech was normal. Cranial nerves were normal. Motor strength was normal for age. The patient was normally coordinated.  PSYCHIATRIC:  Mood and affect were normal and the patient had normal recent and remote memory. The patient's  judgment and insight were normal.       Additional Information   Review and/or order of clinical lab tests:  Review and/or order of radiology tests:  Review and/or order of medicine tests:  Discussion of test results with performing physician:  Decision to obtain old records and/or obtain history from someone other than the patient:  Review and summarization of old records and/or obtaining history from someone other than the patient and.or discussion of case with another health care provider:  Independent visualization of image, tracing or specimen itself:    Time: total time spent with the patient was 45 minutes of which >50% was spent in counseling and coordination of care     Miguelito Hall MD  Internal Medicine  Contact me at [phone]

## 2021-06-24 NOTE — TELEPHONE ENCOUNTER
New Appointment Needed  What is the reason for the visit:    Same Date/Next Day Appt Request  What is the reason for your visit?: Office Visit   Provider Preference: PCP only  How soon do you need to be seen?: Before March, patient had an appointment today but had an  to attend that took longer then expected. He is wondering if there is anyway Dr. Ann can squeeze him before he leaves.   Waitlist offered?: No  Okay to leave a detailed message:  Yes

## 2021-06-24 NOTE — TELEPHONE ENCOUNTER
CALLED FATHER TONY AND LEFT DETAILED MSG WHAT DR KING SAID AND THEN ASKED HIM TO CALL ME BACK FOR FUTURE APPT.      DR PRINCE TAKES 1 NEW/EST PER DAY AND NEXT OPENING IS 3/21/19 OR HE COULD SEE DR GONZALEZ OR DR CARPENTER IF NEEDING TO BE SEEN SOONER.    PLEASE TRANSFER PT -0660 SAI TO SCHEDULE APPT-THANKS!

## 2021-06-29 NOTE — PROGRESS NOTES
Progress Notes by Dimitri Estrada MD at 10/27/2020 10:40 AM     Author: Dimitri Estrada MD Service: -- Author Type: Physician    Filed: 10/27/2020 10:57 AM Encounter Date: 10/27/2020 Status: Signed    : Dimitri Estrada MD (Physician)       Follow Up: Varicose Veins/ Venous Insufficiency    Gagan Nieto is a 80 y.o.  male here for followup. He has worn stockings now for 3 months. I saw them previously in July 2020.  Continued progression of disease and symptoms and issues; reviewed ultrasound results. Patient has ongoing symptoms with pain and swelling needing intervention with pain meds secondary to interfering with daily activities and work. This now inhibits daily chores and activities.     Allergies:Mold    Past Medical History:   Diagnosis Date   ? Benign Adenomatous Polyp Of The Large Intestine     Created by Conversion    ? Calculus of gallbladder 9/15/2015   ? GERD (gastroesophageal reflux disease)    ? Hematuria    ? Hyperlipidemia    ? Insomnia    ? Internal hemorrhoids    ? Nephrolithiasis     Created by Conversion [facility] Annotation: Feb 13 2008 11:26AM - Earle Hinson: on HCTZ    ? Obesity    ? Osteoarthritis    ? Phimosis     Created by Conversion    ? Recurrent major depression in full remission (H)    ? Sleep apnea 2001    CPAP   ? Tinnitus    ? 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 Uses hearing aid    ? Varicose Veins     Created by Conversion    ? Vitamin D deficiency        Past Surgical History:   Procedure Laterality Date   ? CIRCUMCISION     ? INGUINAL HERNIA REPAIR Bilateral    ? LAPAROSCOPIC CHOLECYSTECTOMY N/A 10/8/2015    Procedure: CHOLECYSTECTOMY LAPAROSCOPIC;  Surgeon: Dimitri Estrada MD;  Location: [facility] OR;  Service:    ? MO TOTAL KNEE ARTHROPLASTY Right 8/31/2016    Procedure: RIGHT KNEE TOTAL ARTHROPLASTY;  Surgeon: Callum Hou MD;  Location: [facility] OR;  Service: Orthopedics       CURRENT  MEDS:  Current Outpatient Medications on File Prior to Visit   Medication Sig Dispense Refill   ? amoxicillin (AMOXIL) 500 MG capsule Take 500 mg by mouth as needed Take 4 capsules by mouth one hour prior to dental appoinment as directed..     ? carboxymethylcellulose (REFRESH LIQUIGEL) 1 % ophthalmic solution Administer 1 drop to both eyes 2 (two) times a day.     ? cholecalciferol, vitamin D3, (CHOLECALCIFEROL) 1,000 unit tablet Take 5 tablets by mouth daily.     ? fluticasone propionate (FLONASE) 50 mcg/actuation nasal spray 1 spray into each nostril as needed. 48 g 3   ? OREGANO OIL ORAL Take by mouth.     ? PARoxetine (PAXIL) 20 MG tablet Take 1 tablet (20 mg total) by mouth daily. 90 tablet 3   ? polyethylene glycol (GLYCOLAX) 17 gram/dose powder Take 17 g by mouth as needed.     ? psyllium (FIBER, PSYLLIUM HUSK,) 0.52 gram capsule Take 0.52 g by mouth 2 (two) times a day.      ? saw palmetto 160 mg cap Take by mouth.       No current facility-administered medications on file prior to visit.        Family History   Problem Relation Age of Onset   ? Colon cancer Mother    ? Colon cancer Brother    ? Colon cancer Brother    ? Dementia Sister    ? Dementia Sister    ? No Medical Problems Daughter    ? No Medical Problems Son    ? No Medical Problems Son         reports that he has never smoked. He has never used smokeless tobacco. He reports that he does not drink alcohol or use drugs.    Review of Systems:  Negative except symptoms pain and swelling which have progressed on conservative therapy.  Patient has symptomatic veins and changes of bilateral legs. These have progressed to the point of causing symptoms on a daily basis. This causes issues with daily activities and chores such as washing dishes, vacuuming, mowing lawn, outdoor upkeep and standing for long lengths of time. He has worn compression now for greater than 3 months, worked on an exercise and weight loss program and continues to have symptoms.      OBJECTIVE:  Vitals:    10/27/20 1025   BP: 122/76   Patient Site: Left Arm   Patient Position: Sitting   Cuff Size: Adult Regular   Pulse: 60   Temp: 97.6  F (36.4  C)   TempSrc: Oral     There is no height or weight on file to calculate BMI.    EXAM:  GENERAL: This is a well-developed 80 y.o. male who appears his stated age  HEAD: normocephalic  HEENT: Pupils equal and reactive bilaterally  CARDIAC: RRR without murmur  CHEST/LUNG:  Clear to auscultation  ABDOMEN: Soft, nontender, nondistended, no masses    NEUROLOGIC: Focally intact, nonfocal  VASCULAR: Pulses intact, symmetrical upper and lower extremities. Varicose veins, Spider veins and Chronic venous stasis changes, bilateral                Imaging:    US Venous Insufficiency Legs Bilateral (Order [id number])  Imaging  Date: 10/27/2020 Department: [facility] Released By: Cheryl Woody Authorizing: Dimitri Estrada MD   Study Result    BILATERAL Venous Insufficiency Ultrasound     BILATERAL Lower Extremity          Indication:  Symptomatic varicose veins/pain/swelling     Previous: None     Patient History: Swelling and Stasis     Presenting Symptoms:  Swelling, Varicose Veins, Pain and Stasis     Technique:   Supine and Upright Ultrasound of the Deep and Superficial Veins with Valsalva and Compression Augmentation Maneuvers. Duplex Imaging is performed utilizing gray-scale, Two-dimensional images, color-flow imaging, Doppler waveform analysis, and Spectral doppler imaging done with provacative maneuvers.      Incompetency Criteria:  Deep vein reflux reported when greater than 1000 msec flow reversal. Superficial vein reflux reported when equal to or greater than 600 msec flow reversal.  vein reflux reported as greater than 350 msec flow reversal.      Right  Leg Deep Veins    CFV SFJ PFV PROX SFV   PROX SFV MID SFV DIST POP V. PERON.   V. PTV'S   Compressibility  (FC,PC,NC) FC FC FC FC FC FC FC FC FC    Spontaneous +   + + + + +   +   Phasic  +   + + + + +   +   Augmentation +   + + + + +   +   Reflux +   - - - - -   -         Right Leg Saphenous Veins  Location SFJ PROX THIGH KNEE MID CALF SPJ PROX CALF MID CALF   RT GSV (mm) 6 4 4 3         Reflux - - - -         RT SSV (mm)         3 3 3   Reflux         - - -      Left Leg Deep Veins    CFV SFJ PFV PROX SFV PROX SFV MID SFV DIST POP V. PERON. V. PTV'S   Compressibility  (FC,PC,NC) FC FC FC FC FC FC FC FC FC   Spontaneous +   + + + + +   +   Phasic  +   + + + + +   +   Augmentation +   + + + + +   +   Reflux -   - - - - -   -         Lt Leg Saphenous Veins   Location SFJ PROX THIGH KNEE MID CALF SPJ PROX CALF MID CALF   LT GSV (mm) 16 11 10 6         Reflux + + + +         LT SSV (mm)         2 2 2   Reflux         - - -      Compression Study:  Normal     Impression:       Right Deep Vein Findings: No deep vein thrombosis.     Left Deep Vein Findings: No deep vein thrombosis.     Superficial Vein Findings:      Right Greater Saphenous vein: Patent and competent.     Right Small Saphenous Vein: Patent and competent.     Left Greater Saphenous Vein: Incompetent from the saphenofemoral junction to the mid calf.     Left Small Saphenous Vein: Patent and competent.     Perforating and Accessory Veins: N/A.     Reference:     Compressibility: FC= Fully compressible, PC= Partially compressible, NC= Non-compressible, NV= Not Visualized     Venous Doppler: (+) = Present  (0) = Absent  (-) = Decreased/Unable to Evaluate, (NV) = Not Visualized     Reflux: (+) Incompetent  (-) Competent, (NV) = Not Visualized     Interpretation criteria:      Duration of Retrograde flow (milliseconds)  Category Deep Veins Superficial Veins  veins   Competent < 1000ms < 600ms < 350ms   Incompetent > 1000ms > 600ms > 350ms             Assessment/Plan:    She has  incompetency and insuffiencey of the Left  Greater  saphenous vein.  Left greater saphenous vein measures 16 mm at the  junction. Deep systems are intact. No DVTs. Great candidate for endovenous  closure. We spent counseling time more than 50% of visit: 20 minutes today and discussed the procedure. The risks of anesthesia, infection, bleeding, clotting, DVTs, numbess at the insertion site dermatome and  the process and procedure were discussed. Answered all questions today. Will submit this to [employer] insurance if needed for pre approval.Will set this up when approved. Discussed need to have a  and procedure woul take around 30 minutes with total time 2-3 hours. Also understands a small screening ultrasound 2-3 days out to rule out clot formation at the closed vessel.    DIAGNOSIS: Venous insufficiency of the  left greater saphenous vein      PROCEDURE: Endovenous seal, glue of the left greater saphenous vein    Dimitri Estrada MD  [facility].

## 2021-06-29 NOTE — PROGRESS NOTES
Progress Notes by Dimitri Estrada MD at 7/29/2020  8:00 AM     Author: Dimitri Estrada MD Service: -- Author Type: Physician    Filed: 8/12/2020 11:30 PM Encounter Date: 7/29/2020 Status: Signed    : Dimitri Estrada MD (Physician)           [facility] Vein Consult      Assessment:     1. varicose veins, bilateral   2. spider veins, bilateral     Plan:     1. Treatment options of conservative therapy of stockings use, exercise, weight loss, elevating legs when possible.    2. Script for compression stockings 20-30 mm hg  3. Ultrasound to evaluate legs for incompetency of both deep and superficial system .   4. Surgical treatment, discussed briefly  5. Follow up: 3 months.   6. Call for any questions concerns or issues    Subjective:      Gagan Nieto is a 80 y.o. male  who was referred by Miguelito Hall MD  for evaluation of varicose veins. Symptoms include pain, aching, fatigue, burning, edema and dermatitis. Patient has history of leg selling, pain and vein issues that have progressed. Pain and symptoms have affected daily living and work activities needing medications. Here for evaluation today. no stocking or compression devic use    Allergies:Mold    Past Medical History:   Diagnosis Date   ? Benign Adenomatous Polyp Of The Large Intestine     Created by Conversion    ? Calculus of gallbladder 9/15/2015   ? GERD (gastroesophageal reflux disease)    ? Hematuria    ? Hyperlipidemia    ? Insomnia    ? Internal hemorrhoids    ? Nephrolithiasis     Created by Conversion [facility] Annotation: Feb 13 2008 11:26AM - Earle Hinson: on HCTZ    ? Obesity    ? Osteoarthritis    ? Phimosis     Created by Conversion    ? Recurrent major depression in full remission (H)    ? Sleep apnea 2001    CPAP   ? Tinnitus    ? 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 Uses hearing aid    ? Varicose Veins     Created by Conversion    ? Vitamin D deficiency        Past  Surgical History:   Procedure Laterality Date   ? CIRCUMCISION     ? INGUINAL HERNIA REPAIR Bilateral    ? LAPAROSCOPIC CHOLECYSTECTOMY N/A 10/8/2015    Procedure: CHOLECYSTECTOMY LAPAROSCOPIC;  Surgeon: Dimitri Estrada MD;  Location: [facility];  Service:    ? [place] TOTAL KNEE ARTHROPLASTY Right 8/31/2016    Procedure: RIGHT KNEE TOTAL ARTHROPLASTY;  Surgeon: Callum Hou MD;  Location: [facility];  Service: Orthopedics       Current Outpatient Medications   Medication Sig Note   ? amoxicillin (AMOXIL) 500 MG capsule Take 500 mg by mouth as needed Take 4 capsules by mouth one hour prior to dental appoinment as directed..    ? carboxymethylcellulose (REFRESH LIQUIGEL) 1 % ophthalmic solution Administer 1 drop to both eyes 2 (two) times a day. 12/13/2018: Not taking   ? cholecalciferol, vitamin D3, (CHOLECALCIFEROL) 1,000 unit tablet Take 5 tablets by mouth daily. 12/5/2018: Takes 3 daily   ? fluticasone propionate (FLONASE) 50 mcg/actuation nasal spray 1 spray into each nostril as needed.    ? OREGANO OIL ORAL Take by mouth.    ? PARoxetine (PAXIL) 20 MG tablet Take 1 tablet (20 mg total) by mouth daily.    ? polyethylene glycol (GLYCOLAX) 17 gram/dose powder Take 17 g by mouth as needed. 12/13/2018: Not taking   ? psyllium (FIBER, PSYLLIUM HUSK,) 0.52 gram capsule Take 0.52 g by mouth 2 (two) times a day.  12/5/2018: Not taking   ? saw palmetto 160 mg cap Take by mouth.        Family History   Problem Relation Age of Onset   ? Colon cancer Mother    ? Colon cancer Brother    ? Colon cancer Brother    ? Dementia Sister    ? Dementia Sister    ? No Medical Problems Daughter    ? No Medical Problems Son    ? No Medical Problems Son         reports that he has never smoked. He has never used smokeless tobacco. He reports that he does not drink alcohol or use drugs.      Review of Systems:  Pertinent items are noted in HPI.  A 12 point comprehensive review of systems was negative except as noted.    Patient has symptomatic veins and changes of bilateral legs. These have progressed to the point of causing symptoms on a daily basis. This causes issues with daily activities and chores such as washing dishes, mowing lawn, outdoor upkeep and standing for long lengths of time       Objective:     Vitals:    07/29/20 0811   BP: 124/82   Patient Site: Left Arm   Patient Position: Sitting   Cuff Size: Adult Regular   Pulse: 64   Temp: 98.9  F (37.2  C)   TempSrc: Oral   Weight: (!) 244 lb (110.7 kg)     Body mass index is 38.22 kg/m .    EXAM:  GENERAL: This is a well-developed 80 y.o. male who appears his stated age  HEAD: normocephalic  HEENT: Pupils equal and reactive bilaterally  MOUTH: mucus membranes intact. Normal dentation  CARDIAC: RRR without murmur  CHEST/LUNG:  Clear to auscultation bilaterally  ABDOMEN: Soft, nontender, nondistended, no masses noted   NEUROLOGIC: Focally intact, nonfocal, alert and oriented x 3  INTEGUMENT: No open lesions or ulcers  VASCULAR: Pulses intact, symmetrical upper and lower extremities. There areskin changes consistent with chronic venous insufficiency. Varicose veins present in bilateral greater saphenous distribution. Spider veins present bilateral     .                Imaging:    pending    Dimitri Estrada MD  General Surgery [phone]  Vascular Surgery [phone]

## 2021-07-03 NOTE — ADDENDUM NOTE
Addendum Note by Sampson Ann MD at 12/5/2018  9:50 AM     Author: Sampson Ann MD Service: -- Author Type: Physician    Filed: 12/6/2018 11:39 AM Encounter Date: 12/5/2018 Status: Signed    : Sampson Ann MD (Physician)    Addended by: SAMPSON ANN on: 12/6/2018 11:39 AM        Modules accepted: Orders

## 2021-07-23 ENCOUNTER — OFFICE VISIT (OUTPATIENT)
Dept: OTOLARYNGOLOGY | Facility: CLINIC | Age: 81
End: 2021-07-23
Payer: MEDICARE

## 2021-07-23 DIAGNOSIS — K21.9 LARYNGOPHARYNGEAL REFLUX (LPR): ICD-10-CM

## 2021-07-23 DIAGNOSIS — J37.0 CHRONIC LARYNGITIS: Primary | ICD-10-CM

## 2021-07-23 DIAGNOSIS — H72.92 PERFORATION OF TYMPANIC MEMBRANE, LEFT: ICD-10-CM

## 2021-07-23 DIAGNOSIS — J38.7 PRESBYLARYNX: ICD-10-CM

## 2021-07-23 PROCEDURE — 31575 DIAGNOSTIC LARYNGOSCOPY: CPT | Performed by: OTOLARYNGOLOGY

## 2021-07-23 PROCEDURE — 99213 OFFICE O/P EST LOW 20 MIN: CPT | Mod: 25 | Performed by: OTOLARYNGOLOGY

## 2021-07-23 NOTE — LETTER
7/23/2021         RE: Gagan Nieto  [address]  [place] [address]        Dear Colleague,    Thank you for referring your patient, Gagan Nieto, to the [facility]. Please see a copy of my visit note below.    HPI: This patient is an 80yo M who presents for evaluation of the throat and his ear. His a previous patient of Dr. Olson with perforations and has had otorrhea in the past. Recently, he had some discomfort in the left and was worried about another infection, but the symptoms have gone away. Secondly, there has been a globus sensation for several months now, accompanied with some gradual voice change. Denies fevers, otalgia, weight loss, odynophagia, dysphagia, hemoptysis, and shortness of breath. Does not complain of sour taste, heartburn, or burping. Is not taking reflux medication.    Past medical history, surgical history, social history, family history, medications, and allergies have been reviewed with the patient and are documented above.    Review of Systems: a 10-system review was performed. Pertinent positives are noted in the HPI and on a separate scanned document in the chart.    PHYSICAL EXAMINATION:  GEN: no acute distress, normocephalic. Overweight.  EYES: extraocular movements are intact, pupils are equal and round. Sclera clear.   EARS: auricles are normally formed. The external auditory canals are clear with minimal cerumen. Tympanic membrane on the left notable for a 15% dry perforation. The right TM actually appears intact, but has a central retraction pocket. The depths are not fully seen, but there is no keratin debris present.  No signs of infection.  NOSE: anterior nares are patent. There are no masses or lesions. The septum is non-obstructing.  OC/OP: clear, dentition is in good repair. The tongue and palate are fully mobile and symmetric. No masses or lesions. Cobblestoning of the posterior pharyngeal wall.  HP/L (scope): nasopharynx,  base of tongue, vallecula, epiglottis, and pyriform sinuses are clear. The bilateral vocal folds are mobile and without lesion; mild bilateral presbylarynx present. There is interarytenoid pachydermia and some hyperemia of the laryngeal surface of the epiglottis c/w LPR.  NECK: soft and supple. No lymphadenopathy or masses. Airway is midline.  NEURO: CN VII and XII symmetric. alert and oriented. No spontaneous nystagmus. Gait is normal.  PULM: breathing comfortably on room air, normal chest expansion with respiration  CARDS: no cyanosis or clubbing, normal carotid pulses    FLEXIBLE LARYNGOSCOPY: Scope inserted bilaterally to examine nasal tissue, nasopharynx, posterior oropharynx, hypopharynx, and larynx. See exam notes for exam finding details. Patient tolerated the procedure well.    MEDICAL DECISION-MAKING: This patient is an 82yo M with  1. 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 Discussed diet and lifestyle changes in addition to risks and benefits of H2 blocker vs PPI therapy. Can follow-up with PCP or GI for further management moving forward.  2. Left TM perforation, stable and clean. No signs of infection.        Again, thank you for allowing me to participate in the care of your patient.        Sincerely,        Dina Posey MD

## 2021-07-23 NOTE — PROGRESS NOTES
HPI: This patient is an 82yo M who presents for evaluation of the throat and his ear. His a previous patient of Dr. Farley's with perforations and has had otorrhea in the past. Recently, he had some discomfort in the left and was worried about another infection, but the symptoms have gone away. Secondly, there has been a globus sensation for several months now, accompanied with some gradual voice change. Denies fevers, otalgia, weight loss, odynophagia, dysphagia, hemoptysis, and shortness of breath. Does not complain of sour taste, heartburn, or burping. Is not taking reflux medication.    Past medical history, surgical history, social history, family history, medications, and allergies have been reviewed with the patient and are documented above.    Review of Systems: a 10-system review was performed. Pertinent positives are noted in the HPI and on a separate scanned document in the chart.    PHYSICAL EXAMINATION:  GEN: no acute distress, normocephalic. Overweight.  EYES: extraocular movements are intact, pupils are equal and round. Sclera clear.   EARS: auricles are normally formed. The external auditory canals are clear with minimal cerumen. Tympanic membrane on the left notable for a 15% dry perforation. The right TM actually appears intact, but has a central retraction pocket. The depths are not fully seen, but there is no keratin debris present.  No signs of infection.  NOSE: anterior nares are patent. There are no masses or lesions. The septum is non-obstructing.  OC/OP: clear, dentition is in good repair. The tongue and palate are fully mobile and symmetric. No masses or lesions. Cobblestoning of the posterior pharyngeal wall.  HP/L (scope): nasopharynx, base of tongue, vallecula, epiglottis, and pyriform sinuses are clear. The bilateral vocal folds are mobile and without lesion; mild bilateral presbylarynx present. There is interarytenoid pachydermia and some hyperemia of the laryngeal surface of the  epiglottis c/w LPR.  NECK: soft and supple. No lymphadenopathy or masses. Airway is midline.  NEURO: CN VII and XII symmetric. alert and oriented. No spontaneous nystagmus. Gait is normal.  PULM: breathing comfortably on room air, normal chest expansion with respiration  CARDS: no cyanosis or clubbing, normal carotid pulses    FLEXIBLE LARYNGOSCOPY: Scope inserted bilaterally to examine nasal tissue, nasopharynx, posterior oropharynx, hypopharynx, and larynx. See exam notes for exam finding details. Patient tolerated the procedure well.    MEDICAL DECISION-MAKING: This patient is an 80yo M with  1. 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 Discussed diet and lifestyle changes in addition to risks and benefits of H2 blocker vs PPI therapy. Can follow-up with PCP or GI for further management moving forward.  2. Left TM perforation, stable and clean. No signs of infection.

## 2021-08-09 ENCOUNTER — OFFICE VISIT (OUTPATIENT)
Dept: INTERNAL MEDICINE | Facility: CLINIC | Age: 81
End: 2021-08-09
Payer: MEDICARE

## 2021-08-09 VITALS
DIASTOLIC BLOOD PRESSURE: 72 MMHG | WEIGHT: 246.6 LBS | SYSTOLIC BLOOD PRESSURE: 114 MMHG | OXYGEN SATURATION: 96 % | HEART RATE: 62 BPM | HEIGHT: 67 IN | BODY MASS INDEX: 38.71 KG/M2

## 2021-08-09 DIAGNOSIS — I83.12 VARICOSE VEINS OF LEFT LOWER EXTREMITY WITH INFLAMMATION: ICD-10-CM

## 2021-08-09 DIAGNOSIS — E55.9 VITAMIN D DEFICIENCY: ICD-10-CM

## 2021-08-09 DIAGNOSIS — K21.9 GASTROESOPHAGEAL REFLUX DISEASE WITHOUT ESOPHAGITIS: ICD-10-CM

## 2021-08-09 DIAGNOSIS — R35.0 BENIGN PROSTATIC HYPERPLASIA WITH URINARY FREQUENCY: ICD-10-CM

## 2021-08-09 DIAGNOSIS — G47.33 OSA ON CPAP: ICD-10-CM

## 2021-08-09 DIAGNOSIS — F51.01 PRIMARY INSOMNIA: ICD-10-CM

## 2021-08-09 DIAGNOSIS — H90.6 MIXED CONDUCTIVE AND SENSORINEURAL HEARING LOSS OF BOTH EARS: ICD-10-CM

## 2021-08-09 DIAGNOSIS — E66.01 MORBID OBESITY (H): ICD-10-CM

## 2021-08-09 DIAGNOSIS — Z00.00 ENCOUNTER FOR MEDICARE ANNUAL WELLNESS EXAM: Primary | ICD-10-CM

## 2021-08-09 DIAGNOSIS — Z13.220 SCREENING FOR HYPERLIPIDEMIA: ICD-10-CM

## 2021-08-09 DIAGNOSIS — H43.813 POSTERIOR VITREOUS DETACHMENT, BOTH EYES: ICD-10-CM

## 2021-08-09 DIAGNOSIS — N40.1 BENIGN PROSTATIC HYPERPLASIA WITH URINARY FREQUENCY: ICD-10-CM

## 2021-08-09 DIAGNOSIS — F33.42 RECURRENT MAJOR DEPRESSION IN FULL REMISSION (H): ICD-10-CM

## 2021-08-09 DIAGNOSIS — Z96.651 STATUS POST RIGHT KNEE REPLACEMENT: ICD-10-CM

## 2021-08-09 LAB
ALBUMIN SERPL-MCNC: 3.9 G/DL (ref 3.5–5)
ALP SERPL-CCNC: 59 U/L (ref 45–120)
ALT SERPL W P-5'-P-CCNC: 25 U/L (ref 0–45)
ANION GAP SERPL CALCULATED.3IONS-SCNC: 9 MMOL/L (ref 5–18)
AST SERPL W P-5'-P-CCNC: 26 U/L (ref 0–40)
BILIRUB SERPL-MCNC: 0.7 MG/DL (ref 0–1)
BUN SERPL-MCNC: 16 MG/DL (ref 8–28)
CALCIUM SERPL-MCNC: 10 MG/DL (ref 8.5–10.5)
CHLORIDE BLD-SCNC: 107 MMOL/L (ref 98–107)
CHOLEST SERPL-MCNC: 184 MG/DL
CO2 SERPL-SCNC: 27 MMOL/L (ref 22–31)
CREAT SERPL-MCNC: 0.97 MG/DL (ref 0.7–1.3)
ERYTHROCYTE [DISTWIDTH] IN BLOOD BY AUTOMATED COUNT: 13.7 % (ref 10–15)
FASTING STATUS PATIENT QL REPORTED: YES
GFR SERPL CREATININE-BSD FRML MDRD: 72 ML/MIN/1.73M2
GLUCOSE BLD-MCNC: 92 MG/DL (ref 70–125)
HCT VFR BLD AUTO: 46.1 % (ref 40–53)
HDLC SERPL-MCNC: 61 MG/DL
HGB BLD-MCNC: 14.9 G/DL (ref 13.3–17.7)
LDLC SERPL CALC-MCNC: 110 MG/DL
MCH RBC QN AUTO: 30 PG (ref 26.5–33)
MCHC RBC AUTO-ENTMCNC: 32.3 G/DL (ref 31.5–36.5)
MCV RBC AUTO: 93 FL (ref 78–100)
PLATELET # BLD AUTO: 145 10E3/UL (ref 150–450)
POTASSIUM BLD-SCNC: 4.7 MMOL/L (ref 3.5–5)
PROT SERPL-MCNC: 6.6 G/DL (ref 6–8)
RBC # BLD AUTO: 4.96 10E6/UL (ref 4.4–5.9)
SODIUM SERPL-SCNC: 143 MMOL/L (ref 136–145)
TRIGL SERPL-MCNC: 67 MG/DL
WBC # BLD AUTO: 5 10E3/UL (ref 4–11)

## 2021-08-09 PROCEDURE — 80061 LIPID PANEL: CPT | Performed by: INTERNAL MEDICINE

## 2021-08-09 PROCEDURE — 93010 ELECTROCARDIOGRAM REPORT: CPT | Mod: OFF | Performed by: INTERNAL MEDICINE

## 2021-08-09 PROCEDURE — 93005 ELECTROCARDIOGRAM TRACING: CPT | Performed by: INTERNAL MEDICINE

## 2021-08-09 PROCEDURE — G0439 PPPS, SUBSEQ VISIT: HCPCS | Performed by: INTERNAL MEDICINE

## 2021-08-09 PROCEDURE — 82306 VITAMIN D 25 HYDROXY: CPT | Performed by: INTERNAL MEDICINE

## 2021-08-09 PROCEDURE — 80053 COMPREHEN METABOLIC PANEL: CPT | Performed by: INTERNAL MEDICINE

## 2021-08-09 PROCEDURE — 85027 COMPLETE CBC AUTOMATED: CPT | Performed by: INTERNAL MEDICINE

## 2021-08-09 PROCEDURE — 36415 COLL VENOUS BLD VENIPUNCTURE: CPT | Performed by: INTERNAL MEDICINE

## 2021-08-09 PROCEDURE — 99214 OFFICE O/P EST MOD 30 MIN: CPT | Mod: 25 | Performed by: INTERNAL MEDICINE

## 2021-08-09 RX ORDER — PHENOL 1.4 %
10 AEROSOL, SPRAY (ML) MUCOUS MEMBRANE
Start: 2021-08-09

## 2021-08-09 RX ORDER — CHOLECALCIFEROL (VITAMIN D3) 50 MCG
2 TABLET ORAL DAILY
Start: 2021-08-09

## 2021-08-09 RX ORDER — AZITHROMYCIN 500 MG/1
500 TABLET, FILM COATED ORAL ONCE
Qty: 1 TABLET | Refills: 11 | Status: SHIPPED | OUTPATIENT
Start: 2021-08-09 | End: 2021-08-09

## 2021-08-09 RX ORDER — PAROXETINE 20 MG/1
20 TABLET, FILM COATED ORAL EVERY MORNING
Qty: 90 TABLET | Refills: 3 | Status: SHIPPED | OUTPATIENT
Start: 2021-08-09 | End: 2022-08-03

## 2021-08-09 ASSESSMENT — MIFFLIN-ST. JEOR: SCORE: 1772.2

## 2021-08-09 ASSESSMENT — ACTIVITIES OF DAILY LIVING (ADL): CURRENT_FUNCTION: NO ASSISTANCE NEEDED

## 2021-08-09 NOTE — PROGRESS NOTES
"SUBJECTIVE:   Gagan Nieto is a 81 year old male who presents for Preventive Visit.  Father Bruce comes in for annual wellness visit. Overall doing okay. His mood is generally been stable. He does a lot of funerals and can sometimes a little bit anxious and have a hard time sleeping. Melatonin seems to help. Also on paroxetine long-term. He uses CPAP at night. Eats a lot of bread but otherwise eats a healthy diet. Has gained some weight. Urinary symptoms stable on saw palmetto.    Patient has been advised of split billing requirements and indicates understanding: Yes   Are you in the first 12 months of your Medicare coverage?  No    Healthy Habits:     In general, how would you rate your overall health?  Good    Frequency of exercise:  4-5 days/week    Duration of exercise:  15-30 minutes    Do you usually eat at least 4 servings of fruit and vegetables a day, include whole grains    & fiber and avoid regularly eating high fat or \"junk\" foods?  No    Taking medications regularly:  Yes    Medication side effects:  None    Ability to successfully perform activities of daily living:  No assistance needed    Home Safety:  No safety concerns identified    Hearing Impairment:  No hearing concerns    In the past 6 months, have you been bothered by leaking of urine?  No    In general, how would you rate your overall mental or emotional health?  Good      PHQ-2 Total Score: 0    Additional concerns today:  No    Do you feel safe in your environment? Yes    Have you ever done Advance Care Planning? (For example, a Health Directive, POLST, or a discussion with a medical provider or your loved ones about your wishes): Yes, advance care planning is on file.       Fall risk  Fallen 2 or more times in the past year?: No  Any fall with injury in the past year?: No    Cognitive Screening clock:  Abnormal - 1      Word recall - 2    Do you have sleep apnea, excessive snoring or daytime drowsiness?: no    Reviewed and updated as " "needed this visit by clinical staff  Tobacco  Allergies  Meds              Reviewed and updated as needed this visit by Provider                Social History     Tobacco Use     Smoking status: Never Smoker     Smokeless tobacco: Never Used   Substance Use Topics     Alcohol use: No         Alcohol Use 8/9/2021   Prescreen: >3 drinks/day or >7 drinks/week? No               Current providers sharing in care for this patient include:   Patient Care Team:  Miguelito Hall MD as PCP - Kandi Ng APRN CNP as Nurse Practitioner  Jefferson Mancia MD as MD (Colon and Rectal Surgery)  Dimitri Estrada MD as Assigned Surgical Provider  Miguelito Hall MD as Assigned PCP    The following health maintenance items are reviewed in Epic and correct as of today:  Health Maintenance Due   Topic Date Due     ANNUAL REVIEW OF  ORDERS  Never done     DEPRESSION ACTION PLAN  Never done     MEDICARE ANNUAL WELLNESS VISIT  07/21/2021               Review of Systems  Constitutional, HEENT, cardiovascular, pulmonary, GI, , musculoskeletal, neuro, skin, endocrine and psych systems are negative, except as otherwise noted.    OBJECTIVE:   /72 (BP Location: Left arm, Patient Position: Sitting, Cuff Size: Adult Regular)   Pulse 62   Ht 1.702 m (5' 7\")   Wt 111.9 kg (246 lb 9.6 oz)   SpO2 96%   BMI 38.62 kg/m   Estimated body mass index is 38.62 kg/m  as calculated from the following:    Height as of this encounter: 1.702 m (5' 7\").    Weight as of this encounter: 111.9 kg (246 lb 9.6 oz).  Physical Exam  EYES: Eyelids, conjunctiva, and sclera were normal. Pupils were normal. Cornea, iris, and lens were normal bilaterally.  HEAD, EARS, NOSE, MOUTH, AND THROAT: Head and face were normal. Hearing was normal to voice and the ears were normal to external exam. Nose appearance was normal and there was no discharge. Oropharynx was normal.  NECK: Neck appearance was normal. There were no neck masses " and the thyroid was not enlarged.  RESPIRATORY: Breathing pattern was normal and the chest moved symmetrically.  Percussion/auscultatory percussion was normal.  Lung sounds were normal and there were no abnormal sounds.  CARDIOVASCULAR: Heart rate and rhythm were normal.  S1 and S2 were normal and there were no extra sounds or murmurs. Peripheral pulses in arms and legs were normal.  Jugular venous pressure was normal.  There was no peripheral edema significant varicose veins of the left leg with tenderness.  GASTROINTESTINAL: The abdomen was normal in contour.  Bowel sounds were present.  Percussion detected no organ enlargement or tenderness.  Palpation detected no tenderness, mass, or enlarged organs.   MUSCULOSKELETAL: Skeletal configuration was normal and muscle mass was normal for age. Joint appearance was overall normal.  LYMPHATIC: There were no enlarged nodes.  SKIN/HAIR/NAILS: Skin color was normal.  There were no skin lesions.  Hair and nails were normal.  NEUROLOGIC: The patient was alert and oriented to person, place, time, and circumstance. Speech was normal. Cranial nerves were normal. Motor strength was normal for age. The patient was normally coordinated.  PSYCHIATRIC:  Mood and affect were normal and the patient had normal recent and remote memory. The patient's judgment and insight were normal.        ASSESSMENT / PLAN:   1. Encounter for Medicare annual wellness exam  - EKG 12-lead, tracing only    2. Varicose veins of left lower extremity with inflammation  He will be having surgery in September    3. 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 Gastroesophageal reflux disease without esophagitis  Continue with current plan, stable    5. TERE  "on CPAP    6. Vitamin D deficiency  - vitamin D3 (CHOLECALCIFEROL) 50 mcg (2000 units) tablet; Take 2 tablets (100 mcg) by mouth daily  - Vitamin D Deficiency; Future  - Vitamin D Deficiency    7. Hard of Hearing - Hearing Aids    8. Primary insomnia  - Melatonin 10 MG TABS tablet; Take 1 tablet (10 mg) by mouth nightly as needed for sleep    9. Posterior vitreous detachment, both eyes  Also with ophthalmology    10. Benign prostatic hyperplasia with urinary frequency  - saw palmetto (SERENOA REPENS) 80 MG capsule; Take 1 capsule (80 mg) by mouth daily    11. Status post right knee replacement  - azithromycin (ZITHROMAX) 500 MG tablet; Take 1 tablet (500 mg) by mouth once for 1 dose 30-60 min before dental procedure  Dispense: 1 tablet; Refill: 11    12. Screening for hyperlipidemia  - Lipid panel reflex to direct LDL Fasting; Future  - Lipid panel reflex to direct LDL Fasting    13. Morbid obesity (H)  Estimated body mass index is 38.62 kg/m  as calculated from the following:    Height as of this encounter: 1.702 m (5' 7\").    Weight as of this encounter: 111.9 kg (246 lb 9.6 oz).    Weight management plan: Discussed healthy diet and exercise guidelines    He reports that he has never smoked. He has never used smokeless tobacco.      Appropriate preventive services were discussed with this patient, including applicable screening as appropriate for cardiovascular disease, diabetes, osteopenia/osteoporosis, and glaucoma.  As appropriate for age/gender, discussed screening for colorectal cancer, prostate cancer, breast cancer, and cervical cancer. Checklist reviewing preventive services available has been given to the patient.    Reviewed patients plan of care and provided an AVS. The Basic Care Plan (routine screening as documented in Health Maintenance) for Gagan meets the Care Plan requirement. This Care Plan has been established and reviewed with the Patient.    Counseling Resources:  ATP IV Guidelines  Pooled " Cohorts Equation Calculator  Breast Cancer Risk Calculator  Breast Cancer: Medication to Reduce Risk  FRAX Risk Assessment  ICSI Preventive Guidelines  Dietary Guidelines for Americans, 2010  [employer]'s MyPlate  ASA Prophylaxis  Lung CA Screening    Miguelito Hall MD  [facility]    Identified Health Risks:

## 2021-08-09 NOTE — LETTER
August 10, 2021      Gagan Willsjodee  [address]  [place] [address]        Dear ,    We are writing to inform you of your test results.    Labs all look okay, excellent    Resulted Orders   CBC with platelets   Result Value Ref Range    WBC Count 5.0 4.0 - 11.0 10e3/uL    RBC Count 4.96 4.40 - 5.90 10e6/uL    Hemoglobin 14.9 13.3 - 17.7 g/dL    Hematocrit 46.1 40.0 - 53.0 %    MCV 93 78 - 100 fL    MCH 30.0 26.5 - 33.0 pg    MCHC 32.3 31.5 - 36.5 g/dL    RDW 13.7 10.0 - 15.0 %    Platelet Count 145 (L) 150 - 450 10e3/uL   Vitamin D Deficiency   Result Value Ref Range    Vitamin D, Total (25-Hydroxy) 29 (L) 30 - 80 ug/L    Narrative    Deficiency <10.0 ug/L  Insufficiency 10.0-29.9 ug/L  Sufficiency 30.0-80.0 ug/L  Toxicity (possible) >100.0 ug/L    Lipid panel reflex to direct LDL Fasting   Result Value Ref Range    Cholesterol 184 <=199 mg/dL    Triglycerides 67 <=149 mg/dL    Direct Measure HDL 61 >=40 mg/dL      Comment:      HDL Cholesterol Reference Range:     0-2 years:   No reference ranges established for patients under 2 years old  at [facility] for lipid analytes.    2-8 years:  Greater than 45 mg/dL     18 years and older:   Female: Greater than or equal to 50 mg/dL   Male:   Greater than or equal to 40 mg/dL    LDL Cholesterol Calculated 110 <=129 mg/dL    Patient Fasting > 8hrs? Yes    Comprehensive metabolic panel   Result Value Ref Range    Sodium 143 136 - 145 mmol/L    Potassium 4.7 3.5 - 5.0 mmol/L    Chloride 107 98 - 107 mmol/L    Carbon Dioxide (CO2) 27 22 - 31 mmol/L    Anion Gap 9 5 - 18 mmol/L    Urea Nitrogen 16 8 - 28 mg/dL    Creatinine 0.97 0.70 - 1.30 mg/dL    Calcium 10.0 8.5 - 10.5 mg/dL    Glucose 92 70 - 125 mg/dL    Alkaline Phosphatase 59 45 - 120 U/L    AST 26 0 - 40 U/L    ALT 25 0 - 45 U/L    Protein Total 6.6 6.0 - 8.0 g/dL    Albumin 3.9 3.5 - 5.0 g/dL    Bilirubin Total 0.7 0.0 - 1.0 mg/dL    GFR Estimate 72 >60 mL/min/1.73m2      Comment:       As of July 11, 2021, eGFR is calculated by the CKD-EPI creatinine equation, without race adjustment. eGFR can be influenced by muscle mass, exercise, and diet. The reported eGFR is an estimation only and is only applicable if the renal function is stable.       If you have any questions or concerns, please call the clinic at the number listed above.       Sincerely,      Miguelito Hall MD

## 2021-08-09 NOTE — LETTER
August 16, 2021      Gagan Nieto  [address]  [place] [address]        Dear ,    We are writing to inform you of your test results.    Your EKG looks okay, excellent, and your labs are generally stable, excellent    Resulted Orders   EKG 12-lead, tracing only   Result Value Ref Range    Systolic Blood Pressure  mmHg    Diastolic Blood Pressure  mmHg    Ventricular Rate 57 BPM    Atrial Rate 57 BPM    KY Interval 378 ms    QRS Duration 96 ms     ms    QTc 404 ms    P Axis 27 degrees    R AXIS -26 degrees    T Axis -4 degrees    Interpretation ECG       Sinus bradycardia with 1st degree A-V block  Otherwise normal ECG  When compared with ECG of 21-JUL-2020 11:53,  No significant change was found  Confirmed by SHAE WANG MD LOC: ([id number]) on 8/11/2021 3:30:01 PM     CBC with platelets   Result Value Ref Range    WBC Count 5.0 4.0 - 11.0 10e3/uL    RBC Count 4.96 4.40 - 5.90 10e6/uL    Hemoglobin 14.9 13.3 - 17.7 g/dL    Hematocrit 46.1 40.0 - 53.0 %    MCV 93 78 - 100 fL    MCH 30.0 26.5 - 33.0 pg    MCHC 32.3 31.5 - 36.5 g/dL    RDW 13.7 10.0 - 15.0 %    Platelet Count 145 (L) 150 - 450 10e3/uL   Vitamin D Deficiency   Result Value Ref Range    Vitamin D, Total (25-Hydroxy) 29 (L) 30 - 80 ug/L    Narrative    Deficiency <10.0 ug/L  Insufficiency 10.0-29.9 ug/L  Sufficiency 30.0-80.0 ug/L  Toxicity (possible) >100.0 ug/L    Lipid panel reflex to direct LDL Fasting   Result Value Ref Range    Cholesterol 184 <=199 mg/dL    Triglycerides 67 <=149 mg/dL    Direct Measure HDL 61 >=40 mg/dL      Comment:      HDL Cholesterol Reference Range:     0-2 years:   No reference ranges established for patients under 2 years old  at [facility] for lipid analytes.    2-8 years:  Greater than 45 mg/dL     18 years and older:   Female: Greater than or equal to 50 mg/dL   Male:   Greater than or equal to 40 mg/dL    LDL Cholesterol Calculated 110 <=129 mg/dL    Patient Fasting > 8hrs? Yes     Comprehensive metabolic panel   Result Value Ref Range    Sodium 143 136 - 145 mmol/L    Potassium 4.7 3.5 - 5.0 mmol/L    Chloride 107 98 - 107 mmol/L    Carbon Dioxide (CO2) 27 22 - 31 mmol/L    Anion Gap 9 5 - 18 mmol/L    Urea Nitrogen 16 8 - 28 mg/dL    Creatinine 0.97 0.70 - 1.30 mg/dL    Calcium 10.0 8.5 - 10.5 mg/dL    Glucose 92 70 - 125 mg/dL    Alkaline Phosphatase 59 45 - 120 U/L    AST 26 0 - 40 U/L    ALT 25 0 - 45 U/L    Protein Total 6.6 6.0 - 8.0 g/dL    Albumin 3.9 3.5 - 5.0 g/dL    Bilirubin Total 0.7 0.0 - 1.0 mg/dL    GFR Estimate 72 >60 mL/min/1.73m2      Comment:      As of July 11, 2021, eGFR is calculated by the CKD-EPI creatinine equation, without race adjustment. eGFR can be influenced by muscle mass, exercise, and diet. The reported eGFR is an estimation only and is only applicable if the renal function is stable.       If you have any questions or concerns, please call the clinic at the number listed above.       Sincerely,      Miguelito Hall MD

## 2021-08-10 LAB — DEPRECATED CALCIDIOL+CALCIFEROL SERPL-MC: 29 UG/L (ref 30–80)

## 2021-08-11 LAB
ATRIAL RATE - MUSE: 57 BPM
DIASTOLIC BLOOD PRESSURE - MUSE: NORMAL MMHG
INTERPRETATION ECG - MUSE: NORMAL
P AXIS - MUSE: 27 DEGREES
PR INTERVAL - MUSE: 378 MS
QRS DURATION - MUSE: 96 MS
QT - MUSE: 416 MS
QTC - MUSE: 404 MS
R AXIS - MUSE: -26 DEGREES
SYSTOLIC BLOOD PRESSURE - MUSE: NORMAL MMHG
T AXIS - MUSE: -4 DEGREES
VENTRICULAR RATE- MUSE: 57 BPM

## 2021-10-18 ENCOUNTER — TELEPHONE (OUTPATIENT)
Dept: INTERNAL MEDICINE | Facility: CLINIC | Age: 81
End: 2021-10-18

## 2021-11-03 ENCOUNTER — ALLIED HEALTH/NURSE VISIT (OUTPATIENT)
Dept: FAMILY MEDICINE | Facility: CLINIC | Age: 81
End: 2021-11-03
Payer: MEDICARE

## 2021-11-03 DIAGNOSIS — Z23 IMMUNIZATION DUE: Primary | ICD-10-CM

## 2021-11-03 PROCEDURE — 0004A COVID-19,PF,PFIZER (12+ YRS): CPT

## 2021-11-03 PROCEDURE — 90662 IIV NO PRSV INCREASED AG IM: CPT

## 2021-11-03 PROCEDURE — 99207 PR NO CHARGE NURSE ONLY: CPT

## 2021-11-03 PROCEDURE — 91300 COVID-19,PF,PFIZER (12+ YRS): CPT

## 2021-11-03 PROCEDURE — G0008 ADMIN INFLUENZA VIRUS VAC: HCPCS

## 2022-08-03 ENCOUNTER — OFFICE VISIT (OUTPATIENT)
Dept: INTERNAL MEDICINE | Facility: CLINIC | Age: 82
End: 2022-08-03
Payer: MEDICARE

## 2022-08-03 VITALS
OXYGEN SATURATION: 96 % | HEIGHT: 67 IN | HEART RATE: 50 BPM | BODY MASS INDEX: 37.45 KG/M2 | DIASTOLIC BLOOD PRESSURE: 62 MMHG | WEIGHT: 238.6 LBS | SYSTOLIC BLOOD PRESSURE: 104 MMHG

## 2022-08-03 DIAGNOSIS — E03.9 HYPOTHYROIDISM, UNSPECIFIED TYPE: ICD-10-CM

## 2022-08-03 DIAGNOSIS — F33.42 RECURRENT MAJOR DEPRESSION IN FULL REMISSION (H): ICD-10-CM

## 2022-08-03 DIAGNOSIS — E66.01 MORBID OBESITY (H): ICD-10-CM

## 2022-08-03 DIAGNOSIS — Z13.220 SCREENING FOR HYPERLIPIDEMIA: ICD-10-CM

## 2022-08-03 DIAGNOSIS — N20.0 URIC ACID KIDNEY STONE: ICD-10-CM

## 2022-08-03 DIAGNOSIS — H43.813 POSTERIOR VITREOUS DETACHMENT, BOTH EYES: ICD-10-CM

## 2022-08-03 DIAGNOSIS — Z91.89 POTENTIAL FOR COGNITIVE IMPAIRMENT: ICD-10-CM

## 2022-08-03 DIAGNOSIS — H90.6 MIXED CONDUCTIVE AND SENSORINEURAL HEARING LOSS OF BOTH EARS: ICD-10-CM

## 2022-08-03 DIAGNOSIS — J30.2 SEASONAL ALLERGIC RHINITIS, UNSPECIFIED TRIGGER: ICD-10-CM

## 2022-08-03 DIAGNOSIS — Z12.5 SCREENING FOR PROSTATE CANCER: ICD-10-CM

## 2022-08-03 DIAGNOSIS — R35.0 BENIGN PROSTATIC HYPERPLASIA WITH URINARY FREQUENCY: ICD-10-CM

## 2022-08-03 DIAGNOSIS — Z00.00 ANNUAL PHYSICAL EXAM: Primary | ICD-10-CM

## 2022-08-03 DIAGNOSIS — H47.323 DRUSEN OF BOTH OPTIC DISCS: ICD-10-CM

## 2022-08-03 DIAGNOSIS — N40.1 BENIGN PROSTATIC HYPERPLASIA WITH URINARY FREQUENCY: ICD-10-CM

## 2022-08-03 DIAGNOSIS — F51.01 PRIMARY INSOMNIA: ICD-10-CM

## 2022-08-03 DIAGNOSIS — E55.9 VITAMIN D DEFICIENCY: ICD-10-CM

## 2022-08-03 DIAGNOSIS — K21.9 GASTROESOPHAGEAL REFLUX DISEASE WITHOUT ESOPHAGITIS: ICD-10-CM

## 2022-08-03 DIAGNOSIS — G47.33 OSA ON CPAP: ICD-10-CM

## 2022-08-03 LAB
ALBUMIN SERPL BCG-MCNC: 4.2 G/DL (ref 3.5–5.2)
ALBUMIN UR-MCNC: NEGATIVE MG/DL
ALP SERPL-CCNC: 57 U/L (ref 40–129)
ALT SERPL W P-5'-P-CCNC: 25 U/L (ref 10–50)
ANION GAP SERPL CALCULATED.3IONS-SCNC: 8 MMOL/L (ref 7–15)
APPEARANCE UR: CLEAR
AST SERPL W P-5'-P-CCNC: 33 U/L (ref 10–50)
BACTERIA #/AREA URNS HPF: ABNORMAL /HPF
BILIRUB SERPL-MCNC: 0.5 MG/DL
BILIRUB UR QL STRIP: NEGATIVE
BUN SERPL-MCNC: 18.4 MG/DL (ref 8–23)
CALCIUM SERPL-MCNC: 9.6 MG/DL (ref 8.8–10.2)
CHLORIDE SERPL-SCNC: 106 MMOL/L (ref 98–107)
CHOLEST SERPL-MCNC: 187 MG/DL
COLOR UR AUTO: YELLOW
CREAT SERPL-MCNC: 0.99 MG/DL (ref 0.67–1.17)
DEPRECATED HCO3 PLAS-SCNC: 27 MMOL/L (ref 22–29)
ERYTHROCYTE [DISTWIDTH] IN BLOOD BY AUTOMATED COUNT: 13.3 % (ref 10–15)
GFR SERPL CREATININE-BSD FRML MDRD: 75 ML/MIN/1.73M2
GLUCOSE SERPL-MCNC: 89 MG/DL (ref 70–99)
GLUCOSE UR STRIP-MCNC: NEGATIVE MG/DL
HCT VFR BLD AUTO: 45.2 % (ref 40–53)
HDLC SERPL-MCNC: 55 MG/DL
HGB BLD-MCNC: 14.8 G/DL (ref 13.3–17.7)
HGB UR QL STRIP: ABNORMAL
KETONES UR STRIP-MCNC: NEGATIVE MG/DL
LDLC SERPL CALC-MCNC: 122 MG/DL
LEUKOCYTE ESTERASE UR QL STRIP: NEGATIVE
MCH RBC QN AUTO: 29.7 PG (ref 26.5–33)
MCHC RBC AUTO-ENTMCNC: 32.7 G/DL (ref 31.5–36.5)
MCV RBC AUTO: 91 FL (ref 78–100)
NITRATE UR QL: NEGATIVE
NONHDLC SERPL-MCNC: 132 MG/DL
PH UR STRIP: 5.5 [PH] (ref 5–8)
PLATELET # BLD AUTO: 168 10E3/UL (ref 150–450)
POTASSIUM SERPL-SCNC: 4.7 MMOL/L (ref 3.4–5.3)
PROT SERPL-MCNC: 6.7 G/DL (ref 6.4–8.3)
PSA SERPL-MCNC: 5.97 NG/ML
RBC # BLD AUTO: 4.98 10E6/UL (ref 4.4–5.9)
RBC #/AREA URNS AUTO: ABNORMAL /HPF
SODIUM SERPL-SCNC: 141 MMOL/L (ref 136–145)
SP GR UR STRIP: 1.02 (ref 1–1.03)
SQUAMOUS #/AREA URNS AUTO: ABNORMAL /LPF
T4 FREE SERPL-MCNC: 0.82 NG/DL (ref 0.9–1.7)
TRIGL SERPL-MCNC: 48 MG/DL
TSH SERPL DL<=0.005 MIU/L-ACNC: 7.57 UIU/ML (ref 0.3–4.2)
URATE SERPL-MCNC: 5.9 MG/DL (ref 3.4–7)
UROBILINOGEN UR STRIP-ACNC: 0.2 E.U./DL
VIT B12 SERPL-MCNC: 565 PG/ML (ref 232–1245)
WBC # BLD AUTO: 5.9 10E3/UL (ref 4–11)
WBC #/AREA URNS AUTO: ABNORMAL /HPF

## 2022-08-03 PROCEDURE — 80053 COMPREHEN METABOLIC PANEL: CPT | Performed by: INTERNAL MEDICINE

## 2022-08-03 PROCEDURE — 84550 ASSAY OF BLOOD/URIC ACID: CPT | Performed by: INTERNAL MEDICINE

## 2022-08-03 PROCEDURE — 36415 COLL VENOUS BLD VENIPUNCTURE: CPT | Performed by: INTERNAL MEDICINE

## 2022-08-03 PROCEDURE — 82607 VITAMIN B-12: CPT | Performed by: INTERNAL MEDICINE

## 2022-08-03 PROCEDURE — G0439 PPPS, SUBSEQ VISIT: HCPCS | Performed by: INTERNAL MEDICINE

## 2022-08-03 PROCEDURE — 80061 LIPID PANEL: CPT | Performed by: INTERNAL MEDICINE

## 2022-08-03 PROCEDURE — 84439 ASSAY OF FREE THYROXINE: CPT | Performed by: INTERNAL MEDICINE

## 2022-08-03 PROCEDURE — 99214 OFFICE O/P EST MOD 30 MIN: CPT | Mod: 25 | Performed by: INTERNAL MEDICINE

## 2022-08-03 PROCEDURE — G0103 PSA SCREENING: HCPCS | Performed by: INTERNAL MEDICINE

## 2022-08-03 PROCEDURE — 82306 VITAMIN D 25 HYDROXY: CPT | Performed by: INTERNAL MEDICINE

## 2022-08-03 PROCEDURE — 84443 ASSAY THYROID STIM HORMONE: CPT | Performed by: INTERNAL MEDICINE

## 2022-08-03 PROCEDURE — 81001 URINALYSIS AUTO W/SCOPE: CPT | Performed by: INTERNAL MEDICINE

## 2022-08-03 PROCEDURE — 85027 COMPLETE CBC AUTOMATED: CPT | Performed by: INTERNAL MEDICINE

## 2022-08-03 RX ORDER — PAROXETINE 20 MG/1
20 TABLET, FILM COATED ORAL EVERY MORNING
Qty: 90 TABLET | Refills: 3 | Status: SHIPPED | OUTPATIENT
Start: 2022-08-03 | End: 2023-08-03

## 2022-08-03 RX ORDER — FLUTICASONE PROPIONATE 50 MCG
1 SPRAY, SUSPENSION (ML) NASAL 2 TIMES DAILY
Qty: 16 G | Refills: 3 | Status: SHIPPED | OUTPATIENT
Start: 2022-08-03 | End: 2022-08-11

## 2022-08-03 ASSESSMENT — ENCOUNTER SYMPTOMS
DYSURIA: 0
SHORTNESS OF BREATH: 0
CHILLS: 0
JOINT SWELLING: 0
COUGH: 0
FREQUENCY: 0
HEMATOCHEZIA: 0
ABDOMINAL PAIN: 0
WEAKNESS: 0
HEMATURIA: 0
PALPITATIONS: 0
NERVOUS/ANXIOUS: 0
HEADACHES: 0
HEARTBURN: 0
DIARRHEA: 0
ARTHRALGIAS: 0
PARESTHESIAS: 0
SORE THROAT: 0
EYE PAIN: 0
DIZZINESS: 0
MYALGIAS: 0
NAUSEA: 0
FEVER: 0
CONSTIPATION: 0

## 2022-08-03 ASSESSMENT — PATIENT HEALTH QUESTIONNAIRE - PHQ9
10. IF YOU CHECKED OFF ANY PROBLEMS, HOW DIFFICULT HAVE THESE PROBLEMS MADE IT FOR YOU TO DO YOUR WORK, TAKE CARE OF THINGS AT HOME, OR GET ALONG WITH OTHER PEOPLE: NOT DIFFICULT AT ALL
SUM OF ALL RESPONSES TO PHQ QUESTIONS 1-9: 0

## 2022-08-03 ASSESSMENT — ACTIVITIES OF DAILY LIVING (ADL): CURRENT_FUNCTION: NO ASSISTANCE NEEDED

## 2022-08-03 NOTE — PROGRESS NOTES
"SUBJECTIVE:   Gagan Nieto is a 82 year old male who presents for Preventive Visit.  This 82-year-old  comes in for annual wellness visit and follow-up.  Overall he is doing well.  He has been doing about 4 funerals a week.  Not sleeping great but it stable.  He is using CPAP.  Urination is stable.  Hearing is stable, hearing aids seem to be functioning.  Mood is stable with paroxetine.  Follows with ophthalmology, Dr. Mariel Maza, [facility]    Patient has been advised of split billing requirements and indicates understanding: Yes  Are you in the first 12 months of your Medicare coverage?  No    Healthy Habits:     In general, how would you rate your overall health?  Excellent    Frequency of exercise:  6-7 days/week    Duration of exercise:  45-60 minutes    Do you usually eat at least 4 servings of fruit and vegetables a day, include whole grains    & fiber and avoid regularly eating high fat or \"junk\" foods?  Yes    Taking medications regularly:  Yes    Medication side effects:  Not applicable    Ability to successfully perform activities of daily living:  No assistance needed    Home Safety:  No safety concerns identified    Hearing Impairment:  Difficulty understanding soft or whispered speech    In the past 6 months, have you been bothered by leaking of urine?  No    In general, how would you rate your overall mental or emotional health?  Good      PHQ-2 Total Score: 0    Additional concerns today:  No    Do you feel safe in your environment? Yes    Have you ever done Advance Care Planning? (For example, a Health Directive, POLST, or a discussion with a medical provider or your loved ones about your wishes): Yes, advance care planning is on file.       Fall risk  Fallen 2 or more times in the past year?: No  Any fall with injury in the past year?: No    Cognitive Screening   1) Repeat 3 items (Leader, Season, Table)    2) Clock draw: ABNORMAL unable to place hands   3) 3 item " "recall: Recalls 2 objects   Results: ABNORMAL clock, 1-2 items recalled: PROBABLE COGNITIVE IMPAIRMENT, **INFORM PROVIDER**    Mini-CogTM Copyright DERIK Ervin. Licensed by the author for use in [facility]; reprinted with permission ([email or web address]@[email or web address]). All rights reserved.          Reviewed and updated as needed this visit by clinical staff   Tobacco  Allergies  Meds                Reviewed and updated as needed this visit by Provider                   Social History     Tobacco Use     Smoking status: Never Smoker     Smokeless tobacco: Never Used   Substance Use Topics     Alcohol use: No         Alcohol Use 8/3/2022   Prescreen: >3 drinks/day or >7 drinks/week? No               Current providers sharing in care for this patient include:   Patient Care Team:  Miguelito Hall MD as PCP - General  Kandi Lofton APRN CNP as Nurse Practitioner  Jefferson Mancia MD as MD (Colon and Rectal Surgery)  Miguelito Hall MD as Assigned PCP  Dina Posey MD as Assigned Surgical Provider    The following health maintenance items are reviewed in Epic and correct as of today:  Health Maintenance Due   Topic Date Due     DTAP/TDAP/TD IMMUNIZATION (4 - Td or Tdap) 01/11/2022     COVID-19 Vaccine (4 - Booster for Pfizer series) 03/03/2022     MEDICARE ANNUAL WELLNESS VISIT  08/09/2022     ANNUAL REVIEW OF HM ORDERS  08/09/2022       Review of Systems  Constitutional, HEENT, cardiovascular, pulmonary, GI, , musculoskeletal, neuro, skin, endocrine and psych systems are negative, except as otherwise noted.    OBJECTIVE:   /62 (BP Location: Left arm, Patient Position: Sitting, Cuff Size: Adult Regular)   Pulse 50   Ht 1.702 m (5' 7\")   Wt 108.2 kg (238 lb 9.6 oz)   SpO2 96%   BMI 37.37 kg/m   Estimated body mass index is 37.37 kg/m  as calculated from the following:    Height as of this encounter: 1.702 m (5' 7\").    Weight as of this encounter: 108.2 kg (238 lb 9.6 oz).  Physical " Exam  EYES: Eyelids, conjunctiva, and sclera were normal. Pupils were normal. Cornea, iris, and lens were normal bilaterally.  HEAD, EARS, NOSE, MOUTH, AND THROAT: Head and face were normal. Hearing was normal to voice and the ears were normal to external exam.   NECK: Neck appearance was normal. There were no neck masses and the thyroid was not enlarged.  RESPIRATORY: Breathing pattern was normal and the chest moved symmetrically.  Percussion/auscultatory percussion was normal.  Lung sounds were normal and there were no abnormal sounds.  CARDIOVASCULAR: Heart rate and rhythm were normal.  S1 and S2 were normal and there were no extra sounds or murmurs. Peripheral pulses in arms and legs were normal.  Jugular venous pressure was normal.  There was no peripheral edema.  GASTROINTESTINAL: The abdomen was normal in contour.  Bowel sounds were present.  Percussion detected no organ enlargement or tenderness.  Palpation detected no tenderness, mass, or enlarged organs.   MUSCULOSKELETAL: Skeletal configuration was normal and muscle mass was normal for age. Joint appearance was overall normal.  LYMPHATIC: There were no enlarged nodes.  SKIN/HAIR/NAILS: Skin color was normal.  There were no skin lesions.  Hair and nails were normal.  NEUROLOGIC: The patient was alert and oriented to person, place, time, and circumstance. Speech was normal. Cranial nerves were normal. Motor strength was normal for age. The patient was normally coordinated.  PSYCHIATRIC:  Mood and affect were normal and the patient had normal recent and remote memory. The patient's judgment and insight were normal.    ASSESSMENT / PLAN:   1. Annual physical exam  This 82-year-old man is seen for annual wellness visit.  Issues as discussed below    2. 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 Primary insomnia  Stable    4. Benign prostatic hyperplasia with urinary frequency  Continue saw palmetto  - Comprehensive metabolic panel; Future  - Comprehensive metabolic panel    5. Hard of Hearing - Hearing Aids    6. Gastroesophageal reflux disease without esophagitis  Stable  - CBC with platelets; Future  - CBC with platelets    7. Vitamin D deficiency  - Vitamin D Deficiency; Future  - Vitamin D Deficiency    8. Posterior vitreous detachment, both eyes  9. Drusen of both optic discs  Follows with Dr. Mariel Maza, [place] [facility]    10. Uric acid kidney stone  - UA reflex to Microscopic and Culture; Future  - Uric acid; Future  - UA reflex to Microscopic and Culture  - Uric acid  - Urine Microscopic    11. TERE on CPAP    13. Seasonal allergic rhinitis, unspecified trigger  - fluticasone (FLONASE) 50 MCG/ACT nasal spray; Spray 1 spray into both nostrils 2 times daily  Dispense: 16 g; Refill: 3    14. Screening for hyperlipidemia  - Lipid panel reflex to direct LDL Fasting; Future  - Lipid panel reflex to direct LDL Fasting    15. Screening for prostate cancer  - Prostate Specific Antigen Screen; Future  - Prostate Specific Antigen Screen    16. Potential for cognitive impairment  Patient with a abnormal clock face and poor word recall.  He is highly functional and independent and has no concerns about dementia.  Looking at his clock is unusual but as he explains it to me it sounds like he was unable to hear the instructions as he is quite hard of hearing.  He also gets quite anxious.  - Vitamin B12; Future  - Vitamin B12    12. Morbid obesity (H)  COUNSELING:    Estimated body mass index is 37.37 kg/m  as calculated from the following:    Height as of this encounter: 1.702 m (5' 7\").    Weight as of this encounter: 108.2 kg (238 lb 9.6 oz).    Weight management plan: Discussed healthy diet and exercise guidelines    He reports that he has never smoked. He has never used smokeless " tobacco.      Appropriate preventive services were discussed with this patient, including applicable screening as appropriate for cardiovascular disease, diabetes, osteopenia/osteoporosis, and glaucoma.  As appropriate for age/gender, discussed screening for colorectal cancer, prostate cancer, breast cancer, and cervical cancer. Checklist reviewing preventive services available has been given to the patient.    Reviewed patients plan of care and provided an AVS. The Basic Care Plan (routine screening as documented in Health Maintenance) for Gagan meets the Care Plan requirement. This Care Plan has been established and reviewed with the Patient.    Counseling Resources:  ATP IV Guidelines  Pooled Cohorts Equation Calculator  Breast Cancer Risk Calculator  Breast Cancer: Medication to Reduce Risk  FRAX Risk Assessment  ICSI Preventive Guidelines  Dietary Guidelines for Americans, 2010  [employer] MyPlate  ASA Prophylaxis  Lung CA Screening    Miguelito Hall MD  [facility]    Identified Health Risks:  Answers for HPI/ROS submitted by the patient on 8/3/2022  If you checked off any problems, how difficult have these problems made it for you to do your work, take care of things at home, or get along with other people?: Not difficult at all  PHQ9 TOTAL SCORE: 0

## 2022-08-04 ENCOUNTER — TELEPHONE (OUTPATIENT)
Dept: INTERNAL MEDICINE | Facility: CLINIC | Age: 82
End: 2022-08-04

## 2022-08-04 LAB — DEPRECATED CALCIDIOL+CALCIFEROL SERPL-MC: 36 UG/L (ref 20–75)

## 2022-08-04 RX ORDER — LEVOTHYROXINE SODIUM 25 UG/1
25 TABLET ORAL DAILY
Qty: 30 TABLET | Refills: 1 | Status: SHIPPED | OUTPATIENT
Start: 2022-08-04 | End: 2022-08-11

## 2022-08-04 NOTE — TELEPHONE ENCOUNTER
Attempted to call pt, no answer (1/3). Left message requesting pt to call back clinic. See recent PCP note for this encounter for information pt needs to have regarding his 8/3/22 lab results. Please also assist pt in scheduling a 6 week follow-up appointment with Dr. Hall.               ----- Message from Miguelito Hall MD sent at 8/4/2022  1:34 PM CDT -----  Please call Father Bruce and let him know that his thyroid is a little bit off.  I would like him to start a medication called levothyroxine 25 mcg daily to be taken 30 minutes before breakfast.  He should make an appointment to see me in 6 weeks to reassess.

## 2022-08-05 NOTE — TELEPHONE ENCOUNTER
Spoke with patient and relayed information below from Dr Hall. Patient verbalized understanding of instructions and has no further questions at this time. Appointment scheduled for 9/12 for follow up

## 2022-08-11 DIAGNOSIS — E03.9 HYPOTHYROIDISM, UNSPECIFIED TYPE: ICD-10-CM

## 2022-08-11 DIAGNOSIS — J30.2 SEASONAL ALLERGIC RHINITIS, UNSPECIFIED TRIGGER: ICD-10-CM

## 2022-08-11 RX ORDER — FLUTICASONE PROPIONATE 50 MCG
1 SPRAY, SUSPENSION (ML) NASAL 2 TIMES DAILY
Qty: 24 G | Refills: 3 | Status: SHIPPED | OUTPATIENT
Start: 2022-08-11

## 2022-08-11 RX ORDER — LEVOTHYROXINE SODIUM 25 UG/1
25 TABLET ORAL DAILY
Qty: 90 TABLET | Refills: 1 | Status: SHIPPED | OUTPATIENT
Start: 2022-08-11 | End: 2022-09-12

## 2022-08-23 ENCOUNTER — TRANSFERRED RECORDS (OUTPATIENT)
Dept: HEALTH INFORMATION MANAGEMENT | Facility: CLINIC | Age: 82
End: 2022-08-23

## 2022-08-30 ENCOUNTER — TRANSFERRED RECORDS (OUTPATIENT)
Dept: HEALTH INFORMATION MANAGEMENT | Facility: CLINIC | Age: 82
End: 2022-08-30

## 2022-09-06 ENCOUNTER — TRANSFERRED RECORDS (OUTPATIENT)
Dept: HEALTH INFORMATION MANAGEMENT | Facility: CLINIC | Age: 82
End: 2022-09-06

## 2022-09-12 ENCOUNTER — OFFICE VISIT (OUTPATIENT)
Dept: INTERNAL MEDICINE | Facility: CLINIC | Age: 82
End: 2022-09-12
Payer: MEDICARE

## 2022-09-12 VITALS
HEART RATE: 60 BPM | OXYGEN SATURATION: 97 % | WEIGHT: 238 LBS | SYSTOLIC BLOOD PRESSURE: 130 MMHG | DIASTOLIC BLOOD PRESSURE: 72 MMHG | BODY MASS INDEX: 37.28 KG/M2

## 2022-09-12 DIAGNOSIS — E66.01 MORBID OBESITY (H): ICD-10-CM

## 2022-09-12 DIAGNOSIS — F33.42 RECURRENT MAJOR DEPRESSION IN FULL REMISSION (H): ICD-10-CM

## 2022-09-12 DIAGNOSIS — E03.9 HYPOTHYROIDISM, UNSPECIFIED TYPE: Primary | ICD-10-CM

## 2022-09-12 DIAGNOSIS — E78.2 MIXED HYPERLIPIDEMIA: ICD-10-CM

## 2022-09-12 PROCEDURE — 84443 ASSAY THYROID STIM HORMONE: CPT | Performed by: INTERNAL MEDICINE

## 2022-09-12 PROCEDURE — 36415 COLL VENOUS BLD VENIPUNCTURE: CPT | Performed by: INTERNAL MEDICINE

## 2022-09-12 PROCEDURE — 90471 IMMUNIZATION ADMIN: CPT | Performed by: INTERNAL MEDICINE

## 2022-09-12 PROCEDURE — 90662 IIV NO PRSV INCREASED AG IM: CPT | Performed by: INTERNAL MEDICINE

## 2022-09-12 PROCEDURE — 86376 MICROSOMAL ANTIBODY EACH: CPT | Performed by: INTERNAL MEDICINE

## 2022-09-12 PROCEDURE — 84439 ASSAY OF FREE THYROXINE: CPT | Performed by: INTERNAL MEDICINE

## 2022-09-12 PROCEDURE — 99214 OFFICE O/P EST MOD 30 MIN: CPT | Mod: 25 | Performed by: INTERNAL MEDICINE

## 2022-09-12 RX ORDER — ROSUVASTATIN CALCIUM 5 MG/1
5 TABLET, COATED ORAL DAILY
Qty: 90 TABLET | Refills: 4 | Status: SHIPPED | OUTPATIENT
Start: 2022-09-12 | End: 2023-04-03

## 2022-09-12 RX ORDER — LEVOTHYROXINE SODIUM 25 UG/1
50 TABLET ORAL DAILY
Start: 2022-09-12 | End: 2022-10-18

## 2022-09-12 NOTE — PROGRESS NOTES
Office Visit - Follow Up   Gagan Nieto   82 year old male    Date of Visit: 9/12/2022    Chief Complaint   Patient presents with     Follow Up        Assessment and Plan   1. Hypothyroidism, unspecified type  Increase levothyroxine check TSH and TPO antibodies, follow-up in 1 month for continued dose titration  - levothyroxine (SYNTHROID/LEVOTHROID) 25 MCG tablet; Take 2 tablets (50 mcg) by mouth daily  - TSH with free T4 reflex; Future  - Thyroid peroxidase antibody; Future    2. Mixed hyperlipidemia  After discussion he is in agreement to start rosuvastatin  - rosuvastatin (CRESTOR) 5 MG tablet; Take 1 tablet (5 mg) by mouth daily  Dispense: 90 tablet; Refill: 4    3. Recurrent major depression in full remission (H)  Stable continue paroxetine    4. Morbid obesity (H)  The following high BMI interventions were performed this visit: encouragement to exercise and lifestyle education regarding diet    Return in about 4 weeks (around 10/10/2022) for Follow up.     History of Present Illness   This 82 year old man comes in for follow-up.  Overall he is doing well.  He has started levothyroxine and taking it appropriately.  He also noted his elevated cholesterol and we discussed at length.  He likes Italian food.  Otherwise feeling well, he did fall and break his hand but is following with [facility] orthopedics and this is healing nicely    Review of Systems: A comprehensive review of systems was negative except as noted.     Medications, Allergies and Problem List   Reviewed, reconciled and updated  Post Discharge Medication Reconciliation Status:      Physical Exam   General Appearance:   No acute distress    /72 (BP Location: Left arm, Patient Position: Sitting, Cuff Size: Adult Regular)   Pulse 60   Wt 108 kg (238 lb)   SpO2 97%   BMI 37.28 kg/m      He has a cast on his right hand     Additional Information   Current Outpatient Medications   Medication Sig Dispense Refill      carboxymethylcellulose (REFRESH LIQUIGEL) 1 % ophthalmic solution Place 1 drop into both eyes 3 times daily.       fluticasone (FLONASE) 50 MCG/ACT nasal spray Spray 1 spray into both nostrils 2 times daily 24 g 3     levothyroxine (SYNTHROID/LEVOTHROID) 25 MCG tablet Take 2 tablets (50 mcg) by mouth daily       Melatonin 10 MG TABS tablet Take 1 tablet (10 mg) by mouth nightly as needed for sleep       PARoxetine (PAXIL) 20 MG tablet Take 1 tablet (20 mg) by mouth every morning 90 tablet 3     Polyethylene Glycol 3350 (MIRALAX PO)        rosuvastatin (CRESTOR) 5 MG tablet Take 1 tablet (5 mg) by mouth daily 90 tablet 4     saw palmetto (SERENOA REPENS) 80 MG capsule Take 1 capsule (80 mg) by mouth daily       vitamin D3 (CHOLECALCIFEROL) 50 mcg (2000 units) tablet Take 2 tablets (100 mcg) by mouth daily       Allergies   Allergen Reactions     Seasonal Allergies      Social History     Tobacco Use     Smoking status: Never Smoker     Smokeless tobacco: Never Used   Substance Use Topics     Alcohol use: No     Drug use: No       Time:      Miguelito Hall MD

## 2022-09-12 NOTE — LETTER
September 13, 2022      Father Bruce Nieto  [address]  [place] [address]        Dear ,    We are writing to inform you of your test results.    Thyroid level was improving, excellent.  Continue with change of levothyroxine to 50 mcg daily and reassess later this fall as we discussed    Resulted Orders   TSH with free T4 reflex   Result Value Ref Range    TSH 5.01 (H) 0.30 - 4.20 uIU/mL   Thyroid peroxidase antibody   Result Value Ref Range    Thyroid Peroxidase Antibody <10 <35 IU/mL   T4 free   Result Value Ref Range    Free T4 0.99 0.90 - 1.70 ng/dL       If you have any questions or concerns, please call the clinic at the number listed above.       Sincerely,      Miguelito Hall MD

## 2022-09-13 LAB
T4 FREE SERPL-MCNC: 0.99 NG/DL (ref 0.9–1.7)
THYROPEROXIDASE AB SERPL-ACNC: <10 IU/ML
TSH SERPL DL<=0.005 MIU/L-ACNC: 5.01 UIU/ML (ref 0.3–4.2)

## 2022-09-27 ENCOUNTER — TRANSFERRED RECORDS (OUTPATIENT)
Dept: HEALTH INFORMATION MANAGEMENT | Facility: CLINIC | Age: 82
End: 2022-09-27

## 2022-10-14 ENCOUNTER — TRANSFERRED RECORDS (OUTPATIENT)
Dept: HEALTH INFORMATION MANAGEMENT | Facility: CLINIC | Age: 82
End: 2022-10-14

## 2022-10-18 ENCOUNTER — TRANSFERRED RECORDS (OUTPATIENT)
Dept: HEALTH INFORMATION MANAGEMENT | Facility: CLINIC | Age: 82
End: 2022-10-18

## 2022-10-18 DIAGNOSIS — E03.9 HYPOTHYROIDISM, UNSPECIFIED TYPE: ICD-10-CM

## 2022-10-18 RX ORDER — LEVOTHYROXINE SODIUM 25 UG/1
25 TABLET ORAL DAILY
Qty: 90 TABLET | Refills: 3 | Status: SHIPPED | OUTPATIENT
Start: 2022-10-18 | End: 2022-10-20

## 2022-10-18 NOTE — TELEPHONE ENCOUNTER
Medication Question or Refill        What medication are you calling about (include dose and sig)?:   levothyroxine (SYNTHROID/LEVOTHROID) 25 MCG tablet   9/12/2022  No   Sig - Route: Take 2 tablets (50 mcg) by mouth daily - Oral         Controlled Substance Agreement on file:   CSA -- Patient Level:     [Media Unavailable] Controlled Substance Agreement - Opioid - Scan on 12/7/2018   [Media Unavailable] Controlled Substance Agreement - Opioid - Scan on 1/20/2017: ZOLPIDEM       Who prescribed the medication?: Dr Hall changed his script and he is leaving out of town on Friday and needs done prior to that please.      Do you need a refill? Yes:     Do you have any questions or concerns?  No    Preferred Pharmacy:   [facility]/pharmacy #[id number] - [place], [place] - [address] [place] [address]  [address] [place] [address]  Phone: [phone] Fax: [phone]      Okay to leave a detailed message?: Yes at Home number on file [phone] (home)

## 2022-10-19 ENCOUNTER — TELEPHONE (OUTPATIENT)
Dept: INTERNAL MEDICINE | Facility: CLINIC | Age: 82
End: 2022-10-19

## 2022-10-19 NOTE — TELEPHONE ENCOUNTER
General Call      Reason for Call:  Pt stating he has been taking 2 tabs of the 25mg Levothyroxine   The Rx that was recently sent to pharmacy states 1 tab daily  Pt stating PCP asked his to take 2 tabs daily    Please advise  Pt will be leaving town on 10/21/2022    [facility] [address]    What are your questions or concerns:  n/a    Date of last appointment with provider: n/a    Okay to leave a detailed message?: Yes at Home number on file [phone] (home)

## 2022-10-20 ENCOUNTER — NURSE TRIAGE (OUTPATIENT)
Dept: NURSING | Facility: CLINIC | Age: 82
End: 2022-10-20

## 2022-10-20 DIAGNOSIS — E03.9 HYPOTHYROIDISM, UNSPECIFIED TYPE: ICD-10-CM

## 2022-10-20 RX ORDER — LEVOTHYROXINE SODIUM 50 UG/1
50 TABLET ORAL DAILY
Qty: 90 TABLET | Refills: 0 | Status: SHIPPED | OUTPATIENT
Start: 2022-10-20 | End: 2022-11-04

## 2022-10-20 NOTE — TELEPHONE ENCOUNTER
Per chart review patient's last office visit with Dr. Hall was 9/12/22:     1. Hypothyroidism, unspecified type  Increase levothyroxine check TSH and TPO antibodies, follow-up in 1 month for continued dose titration  - levothyroxine (SYNTHROID/LEVOTHROID) 25 MCG tablet; Take 2 tablets (50 mcg) by mouth daily  - TSH with free T4 reflex; Future  - Thyroid peroxidase antibody; Future      Per lab letter sent within this visit encounter from Dr. Hall:     Dear ,    We are writing to inform you of your test results.    Thyroid level was improving, excellent.  Continue with change of levothyroxine to 50 mcg daily and reassess later this fall as we discussed      However, medication was last signed on 10/18/22 by Dr. Hall for 25 mcg daily. It appears that this medication may have been signed in error, pt contacted the clinic regarding this discrepancy yesterday 10/19/22.       50 mcg levothyroxine dose is pended for covering provider review. Pt is leaving to go out of town and is hoping to have the correct prescription sent in.

## 2022-10-20 NOTE — TELEPHONE ENCOUNTER
Patient calling to get his Levothyroxine RX changed to 50mcg instead of 25mcg.    He was advised per Dr. Hall to change to the 50mcg , but new RX was never sent to [facility] He states.    Please advise.    Brynn Lunsford RN   [facility] Nurse Advisor

## 2022-11-03 ENCOUNTER — OFFICE VISIT (OUTPATIENT)
Dept: INTERNAL MEDICINE | Facility: CLINIC | Age: 82
End: 2022-11-03
Payer: MEDICARE

## 2022-11-03 VITALS
WEIGHT: 245.5 LBS | TEMPERATURE: 97.6 F | BODY MASS INDEX: 38.53 KG/M2 | SYSTOLIC BLOOD PRESSURE: 117 MMHG | DIASTOLIC BLOOD PRESSURE: 73 MMHG | OXYGEN SATURATION: 97 % | HEIGHT: 67 IN | HEART RATE: 55 BPM

## 2022-11-03 DIAGNOSIS — E03.9 HYPOTHYROIDISM, UNSPECIFIED TYPE: ICD-10-CM

## 2022-11-03 DIAGNOSIS — J34.89 NASAL SORE: ICD-10-CM

## 2022-11-03 DIAGNOSIS — Z23 HIGH PRIORITY FOR 2019-NCOV VACCINE: Primary | ICD-10-CM

## 2022-11-03 DIAGNOSIS — E78.2 MIXED HYPERLIPIDEMIA: ICD-10-CM

## 2022-11-03 LAB — TSH SERPL DL<=0.005 MIU/L-ACNC: 3.95 UIU/ML (ref 0.3–4.2)

## 2022-11-03 PROCEDURE — 0124A COVID-19,PF,PFIZER BOOSTER BIVALENT: CPT | Performed by: INTERNAL MEDICINE

## 2022-11-03 PROCEDURE — 84443 ASSAY THYROID STIM HORMONE: CPT | Performed by: INTERNAL MEDICINE

## 2022-11-03 PROCEDURE — 99214 OFFICE O/P EST MOD 30 MIN: CPT | Mod: 25 | Performed by: INTERNAL MEDICINE

## 2022-11-03 PROCEDURE — 91312 COVID-19,PF,PFIZER BOOSTER BIVALENT: CPT | Performed by: INTERNAL MEDICINE

## 2022-11-03 PROCEDURE — 36415 COLL VENOUS BLD VENIPUNCTURE: CPT | Performed by: INTERNAL MEDICINE

## 2022-11-03 NOTE — PROGRESS NOTES
"  Office Visit - Follow Up   Gagan Nieto   82 year old male    Date of Visit: 11/3/2022    Chief Complaint   Patient presents with     Recheck Medication     RECHECK     PT REQUEST COVID BOOSTER SHOT     Imm/Inj     COVID-19 VACCINE        Assessment and Plan   1. High priority for 2019-nCoV vaccine  - COVID-19,PF,PFIZER BOOSTER BIVALENT 12+Yrs    2. Hypothyroidism, unspecified type  Continue levothyroxine adjust dose pending TSH result  - TSH with free T4 reflex; Future  - TSH with free T4 reflex    3. Mixed hyperlipidemia  Continue rosuvastatin    4. Nasal sore  Seems to be healing, discussed with him that nonhealing sores need to be evaluated by dermatology with concern for a skin cancer    Return in about 2 months (around 1/3/2023) for Follow up.     History of Present Illness   This 82 year old  comes in for follow-up.  We have been titrating levothyroxine and he is tolerating this.  We recently added rosuvastatin and he is having no issues with this either.  He had a sore on his nose and he is thinks this is likely from his glasses and CPAP machine and he has been putting a Band-Aid with a little bit of salve on this and it seems to be getting better.       Physical Exam   General Appearance:   No acute distress    /73 (BP Location: Left arm, Patient Position: Sitting, Cuff Size: Adult Large)   Pulse 55   Temp 97.6  F (36.4  C) (Tympanic)   Ht 1.689 m (5' 6.5\")   Wt 111.4 kg (245 lb 8 oz)   SpO2 97%   BMI 39.03 kg/m           Additional Information   Current Outpatient Medications   Medication Sig Dispense Refill     carboxymethylcellulose (REFRESH LIQUIGEL) 1 % ophthalmic solution Place 1 drop into both eyes 3 times daily.       fluticasone (FLONASE) 50 MCG/ACT nasal spray Spray 1 spray into both nostrils 2 times daily 24 g 3     levothyroxine (SYNTHROID/LEVOTHROID) 50 MCG tablet Take 1 tablet (50 mcg) by mouth daily 90 tablet 0     Melatonin 10 MG TABS tablet Take 1 tablet (10 " mg) by mouth nightly as needed for sleep       PARoxetine (PAXIL) 20 MG tablet Take 1 tablet (20 mg) by mouth every morning 90 tablet 3     Polyethylene Glycol 3350 (MIRALAX PO)        rosuvastatin (CRESTOR) 5 MG tablet Take 1 tablet (5 mg) by mouth daily 90 tablet 4     saw palmetto (SERENOA REPENS) 80 MG capsule Take 1 capsule (80 mg) by mouth daily       vitamin D3 (CHOLECALCIFEROL) 50 mcg (2000 units) tablet Take 2 tablets (100 mcg) by mouth daily       Allergies   Allergen Reactions     Seasonal Allergies        Time:      Miguelito Hall MD

## 2022-11-04 RX ORDER — LEVOTHYROXINE SODIUM 75 UG/1
75 TABLET ORAL DAILY
Qty: 90 TABLET | Refills: 3 | Status: SHIPPED | OUTPATIENT
Start: 2022-11-04 | End: 2023-04-03

## 2022-11-08 ENCOUNTER — TRANSFERRED RECORDS (OUTPATIENT)
Dept: HEALTH INFORMATION MANAGEMENT | Facility: CLINIC | Age: 82
End: 2022-11-08

## 2022-11-08 ENCOUNTER — TELEPHONE (OUTPATIENT)
Dept: INTERNAL MEDICINE | Facility: CLINIC | Age: 82
End: 2022-11-08

## 2022-11-08 NOTE — TELEPHONE ENCOUNTER
Attempted to call pt, no answer (1/3). Left message requesting pt to call back clinic. See recent PCP note for this encounter.

## 2022-11-08 NOTE — TELEPHONE ENCOUNTER
Attempted to call pt, no answer (2/3). Left message requesting pt to call back clinic. See recent PCP note for this encounter.

## 2022-11-08 NOTE — TELEPHONE ENCOUNTER
----- Message from Miguelito Hall MD sent at 11/4/2022  8:49 AM CDT -----  Please call father Bruce and let him know that his TSH is improving but I would like him to increase levothyroxine to 75 mcg and come back in in 2 months for a recheck.  I believe he might already have this appointment scheduled

## 2022-11-08 NOTE — TELEPHONE ENCOUNTER
Spoke with patient and confirmed with him that he is aware of the new prescription and that he is aware of the result note.     Pt will call back if he has further questions / concerns filling the new prescription.

## 2022-11-09 ENCOUNTER — TRANSCRIBE ORDERS (OUTPATIENT)
Dept: OTHER | Age: 82
End: 2022-11-09

## 2022-11-09 DIAGNOSIS — I89.0 LYMPHEDEMA: ICD-10-CM

## 2022-11-09 DIAGNOSIS — I87.2 CHRONIC VENOUS INSUFFICIENCY: Primary | ICD-10-CM

## 2022-12-06 ENCOUNTER — TRANSFERRED RECORDS (OUTPATIENT)
Dept: HEALTH INFORMATION MANAGEMENT | Facility: CLINIC | Age: 82
End: 2022-12-06

## 2022-12-16 ENCOUNTER — TRANSFERRED RECORDS (OUTPATIENT)
Dept: HEALTH INFORMATION MANAGEMENT | Facility: CLINIC | Age: 82
End: 2022-12-16

## 2022-12-20 ENCOUNTER — TRANSFERRED RECORDS (OUTPATIENT)
Dept: HEALTH INFORMATION MANAGEMENT | Facility: CLINIC | Age: 82
End: 2022-12-20

## 2022-12-28 ENCOUNTER — HOSPITAL ENCOUNTER (OUTPATIENT)
Dept: PHYSICAL THERAPY | Facility: REHABILITATION | Age: 82
Discharge: HOME | End: 2022-12-28
Attending: STUDENT IN AN ORGANIZED HEALTH CARE EDUCATION/TRAINING PROGRAM
Payer: MEDICARE

## 2022-12-28 DIAGNOSIS — I89.0 SECONDARY LYMPHEDEMA: Primary | ICD-10-CM

## 2022-12-28 PROCEDURE — 97140 MANUAL THERAPY 1/> REGIONS: CPT | Mod: GP | Performed by: PHYSICAL THERAPIST

## 2022-12-28 PROCEDURE — 97535 SELF CARE MNGMENT TRAINING: CPT | Mod: GP | Performed by: PHYSICAL THERAPIST

## 2022-12-28 PROCEDURE — 97161 PT EVAL LOW COMPLEX 20 MIN: CPT | Mod: GP | Performed by: PHYSICAL THERAPIST

## 2022-12-28 NOTE — PROGRESS NOTES
[facility]        OUTPATIENT PHYSICAL THERAPY EDEMA EVALUATION  PLAN OF TREATMENT FOR OUTPATIENT REHABILITATION  (COMPLETE FOR INITIAL CLAIMS ONLY)  Patient's Last Name, First Name, Gagan Montejo                           Provider s Name:   [facility] Medical Record No.  [id number]     Start of Care Date:  12/28/22   Onset Date:  11/09/22   Type:  PT   Medical Diagnosis:  Chronic venous insufficiency, Lymphedema   Therapy Diagnosis:  Secondary Lymphedema Visits from SOC:  1                                     __________________________________________________________________________________   Plan of Treatment/Functional Goals:    Manual lymph drainage, Gradient compression bandaging, Fit for compression garment, Exercises, Precautions to prevent infection / exacerbation, Education, Manual therapy, Skin care / precautions, Home management program development        GOALS  1. Goal description: Pt will demonstrate a reduction in B LE volume of 5-10% to allow him to be fitted for new compression garments       Target date: 03/27/23  2. Goal description: Pt will be indep with home management of swelling including exercise, skin care and compression.       Target date: 03/27/23              Treatment Frequency: (P) Other (see comments)   Treatment duration: (P) 2x/week, up to 10 visits, up to 90 days    Rachel Fontanez, PT                                    I CERTIFY THE NEED FOR THESE SERVICES FURNISHED UNDER        THIS PLAN OF TREATMENT AND WHILE UNDER MY CARE .             Physician Signature               Date    X_____________________________________________________                    Certification date from: 12/28/22       Certification date to: 03/27/23           Referring physician: Aurora Blood   Initial Assessment  See Epic Evaluation- Start of care:  12/28/22 12/28/22 1600   Quick Adds   Quick Adds Certification   Rehab Discipline   Discipline PT   Type of Visit   Type of visit Initial Edema Evaluation   General Information   Start of care 12/28/22   Referring physician Aurora Blood   Orders Evaluate and treat as indicated   Order date 11/09/22   Medical diagnosis Chronic venous insufficiency, Lymphedema   Onset of illness / date of surgery 11/09/22   Edema onset 11/09/22   Affected body parts LLE;RLE   Edema etiology Chronic Venous Insufficiency   Fall Risk Screen   Fall screen completed by PT   Have you fallen 2 or more times in the past year? No   Have you fallen and had an injury in the past year? Yes   Is patient a fall risk? No   Abuse Screen (yes response referral indicated)   Feels Unsafe at Home or Work/School no   Feels Threatened by Someone no   Does Anyone Try to Keep You From Having Contact with Others or Doing Things Outside Your Home? no   Physical Signs of Abuse Present no   Patient needs abuse support services and resources No   System Outcome Measures   Outcome Measures   (declined to complete)   Subjective Report   Patient report of symptoms Pt is an 82 year-old man with chief complaint B LE swelling. PMH significant for CVI. He initially had swelling in the left leg with varicose veins present. He is working with the vein clinic for both legs. He reports that both legs seem heavy. He does walking every day. Sometimes he can walk faster and sometimes it is slow. He will have swelling into the ankles, but not so much on the foot. He has knee high Velcro compression that is 18 months old for the left leg and a thigh high sock that is 6 months old for the right leg. He obtained them from [employer]. He feels that the compression is tolerable, but the sock is hard to don.   Patient / Family Goals   Patient / family goals statement Not sure   Pain   Patient currently in pain No   Edema Exam / Assessment   Skin  condition Pitting;Dryness   Pitting 1+   Pitting location B lower legs   Capillary refill Symmetrical   Dorsal pedal pulse Symmetrical   Stemmer sign Positive   Stemmer sign comments B feet   Girth Measurements   Girth Measurements Refer to separate girth measurement flowsheet   Bed Mobility   Bed mobility indep   Transfers   Transfers UE support sit<>stand   Gait / Locomotion   Gait / Locomotion Indep   Planned Edema Interventions   Planned edema interventions Manual lymph drainage;Gradient compression bandaging;Fit for compression garment;Exercises;Precautions to prevent infection / exacerbation;Education;Manual therapy;Skin care / precautions;Home management program development   Clinical Impression   Criteria for skilled therapeutic intervention met Yes   Therapy diagnosis Secondary Lymphedema   Influenced by the following impairments / conditions Stage 1   Functional limitations due to impairments / conditions Difficulty fitting into clothes; heaviness in the legs with walking   Clinical Presentation Stable/Uncomplicated   Clinical Decision Making (Complexity) Low complexity   Treatment Frequency Other (see comments)   Treatment duration 2x/week, up to 10 visits, up to 90 days   Patient / family and/or staff in agreement with plan of care Yes   Risks and benefits of therapy have been explained Yes   Clinical impression comments Pt is an 82 year-old man with chief complaint B LE swelling. demonstrates stage 1 B LE (L>R) secondary lymphedema d/t CVI. He has been wearing Velcro compression on the left leg and a thigh-high sock on the right leg, but needs to obtain new Velcro wraps for B legs below knee. He has pitting edema without significant volume (L>R), though would benefit from short course of therapy to decrease leg volume before being fitted for new velcro wraps.   Goals   Edema Eval Goals 1;2   Goal 1   Goal identifier Volume   Goal description Pt will demonstrate a reduction in B LE volume of 5-10% to  allow him to be fitted for new compression garments   Target date 03/27/23   Goal 2   Goal identifier Home management   Goal description Pt will be indep with home management of swelling including exercise, skin care and compression.   Target date 03/27/23   Total Evaluation Time   PT Eval, Low Complexity Minutes (68009) 21   Certification   Certification date from 12/28/22   Certification date to 03/27/23   Medical Diagnosis Chronic venous insufficiency, Lymphedema   Certification I certify the need for these services furnished under this plan of treatment and while under my care.  (Physician co-signature of this document indicates review and certification of the therapy plan).        Rachel Fontanez, PT, DPT, CLT  12/28/2022

## 2023-01-03 ENCOUNTER — OFFICE VISIT (OUTPATIENT)
Dept: INTERNAL MEDICINE | Facility: CLINIC | Age: 83
End: 2023-01-03
Payer: MEDICARE

## 2023-01-03 VITALS
HEIGHT: 67 IN | RESPIRATION RATE: 17 BRPM | HEART RATE: 53 BPM | DIASTOLIC BLOOD PRESSURE: 58 MMHG | SYSTOLIC BLOOD PRESSURE: 110 MMHG | WEIGHT: 238.8 LBS | OXYGEN SATURATION: 96 % | TEMPERATURE: 97.7 F | BODY MASS INDEX: 37.48 KG/M2

## 2023-01-03 DIAGNOSIS — E03.9 HYPOTHYROIDISM, UNSPECIFIED TYPE: Primary | ICD-10-CM

## 2023-01-03 DIAGNOSIS — E66.01 MORBID OBESITY (H): ICD-10-CM

## 2023-01-03 DIAGNOSIS — F33.42 RECURRENT MAJOR DEPRESSION IN FULL REMISSION (H): ICD-10-CM

## 2023-01-03 DIAGNOSIS — E78.2 MIXED HYPERLIPIDEMIA: ICD-10-CM

## 2023-01-03 LAB
CHOLEST SERPL-MCNC: 170 MG/DL
HDLC SERPL-MCNC: 65 MG/DL
LDLC SERPL CALC-MCNC: 93 MG/DL
NONHDLC SERPL-MCNC: 105 MG/DL
TRIGL SERPL-MCNC: 62 MG/DL
TSH SERPL DL<=0.005 MIU/L-ACNC: 2.39 UIU/ML (ref 0.3–4.2)

## 2023-01-03 PROCEDURE — 99214 OFFICE O/P EST MOD 30 MIN: CPT | Performed by: INTERNAL MEDICINE

## 2023-01-03 PROCEDURE — 80061 LIPID PANEL: CPT | Performed by: INTERNAL MEDICINE

## 2023-01-03 PROCEDURE — 84443 ASSAY THYROID STIM HORMONE: CPT | Performed by: INTERNAL MEDICINE

## 2023-01-03 PROCEDURE — 36415 COLL VENOUS BLD VENIPUNCTURE: CPT | Performed by: INTERNAL MEDICINE

## 2023-01-03 ASSESSMENT — PATIENT HEALTH QUESTIONNAIRE - PHQ9: SUM OF ALL RESPONSES TO PHQ QUESTIONS 1-9: 0

## 2023-01-03 NOTE — PROGRESS NOTES
"  Office Visit - Follow Up   Gagan Nieto   82 year old male    Date of Visit: 1/3/2023    Chief Complaint   Patient presents with     Recheck Medication     2 mo recheck- blood test and checking medication.        Assessment and Plan   1. Hypothyroidism, unspecified type  Continue levothyroxine check with  - TSH with free T4 reflex; Future  - TSH with free T4 reflex    2. Mixed hyperlipidemia  Continue rosuvastatin check with  - Lipid panel reflex to direct LDL Fasting; Future  - Lipid panel reflex to direct LDL Fasting    3. Recurrent major depression in full remission (H)  Continue Paxil with, stable    4. Morbid obesity (H)  The following high BMI interventions were performed this visit: encouragement to exercise and lifestyle education regarding diet    Return in about 3 months (around 4/3/2023) for Follow up.     History of Present Illness   This 82 year old  comes in for follow-up.  Overall doing well.  Tolerating medications.  Has been seen by [facility] orthopedics regarding some low back pain on the left which is improving with acetaminophen and he will be starting physical therapy.       Physical Exam   General Appearance:   No acute distress    /58   Pulse 53   Temp 97.7  F (36.5  C) (Tympanic)   Resp 17   Ht 1.689 m (5' 6.5\")   Wt 108.3 kg (238 lb 12.8 oz)   SpO2 96%   BMI 37.97 kg/m           Additional Information   Current Outpatient Medications   Medication Sig Dispense Refill     carboxymethylcellulose (REFRESH LIQUIGEL) 1 % ophthalmic solution Place 1 drop into both eyes 3 times daily.       fluticasone (FLONASE) 50 MCG/ACT nasal spray Spray 1 spray into both nostrils 2 times daily 24 g 3     levothyroxine (SYNTHROID/LEVOTHROID) 75 MCG tablet Take 1 tablet (75 mcg) by mouth daily 90 tablet 3     Melatonin 10 MG TABS tablet Take 1 tablet (10 mg) by mouth nightly as needed for sleep       PARoxetine (PAXIL) 20 MG tablet Take 1 tablet (20 mg) by mouth every morning 90 tablet 3 "     Polyethylene Glycol 3350 (MIRALAX PO)        rosuvastatin (CRESTOR) 5 MG tablet Take 1 tablet (5 mg) by mouth daily 90 tablet 4     saw palmetto (SERENOA REPENS) 80 MG capsule Take 1 capsule (80 mg) by mouth daily       vitamin D3 (CHOLECALCIFEROL) 50 mcg (2000 units) tablet Take 2 tablets (100 mcg) by mouth daily       Allergies   Allergen Reactions     Seasonal Allergies        Time:      Miguelito Hall MD    Answers for HPI/ROS submitted by the patient on 1/3/2023  What is the reason for your visit today? : 2 month recheck  How many servings of fruits and vegetables do you eat daily?: 4 or more  On average, how many sweetened beverages do you drink each day (Examples: soda, juice, sweet tea, etc.  Do NOT count diet or artificially sweetened beverages)?: 1  How many minutes a day do you exercise enough to make your heart beat faster?: 30 to 60  How many days a week do you exercise enough to make your heart beat faster?: 7  How many days per week do you miss taking your medication?: 0

## 2023-01-10 DIAGNOSIS — E03.9 HYPOTHYROIDISM, UNSPECIFIED TYPE: ICD-10-CM

## 2023-01-11 RX ORDER — LEVOTHYROXINE SODIUM 50 UG/1
TABLET ORAL
Qty: 90 TABLET | Refills: 0 | OUTPATIENT
Start: 2023-01-11

## 2023-01-11 NOTE — TELEPHONE ENCOUNTER
"Refused:  Increased to 75mg on 11/4/22:      Last Written Prescription Date:  10/20/22  Last Fill Quantity: 90,  # refills: 0  Last office visit provider:   1/3/23    Requested Prescriptions   Pending Prescriptions Disp Refills     levothyroxine (SYNTHROID/LEVOTHROID) 50 MCG tablet [Pharmacy Med Name: LEVOTHYROXINE 50 MCG TABLET] 90 tablet 0     Sig: TAKE 1 TABLET BY MOUTH EVERY DAY       Thyroid Protocol Passed - 1/11/2023  1:01 PM        Passed - Patient is 12 years or older        Passed - Recent (12 mo) or future (30 days) visit within the authorizing provider's specialty     Patient has had an office visit with the authorizing provider or a provider within the authorizing providers department within the previous 12 mos or has a future within next 30 days. See \"Patient Info\" tab in inbasket, or \"Choose Columns\" in Meds & Orders section of the refill encounter.              Passed - Medication is active on med list        Passed - Normal TSH on file in past 12 months     Recent Labs   Lab Test 01/03/23  1322   TSH 2.39                   FRANCISCO VOSS RN 01/11/23 1:01 PM  "

## 2023-01-13 NOTE — TELEPHONE ENCOUNTER
Diagnosis, Referred by & from: Hemorrhoids   Appt date: 3/16/2023   NOTES STATUS DETAILS   OFFICE NOTE from referring provider N/A    OFFICE NOTE from other specialist Care Everywhere / [facility]:  1/19/23 - URO OV with Dr. Cele Kiser - [place]:  1/3/23, 11/3/22 - PCC OV with Dr. Hall    [facility]:  9/14/15 - GI OV with Heather Morales NP  8/3/15 - URO OV with Dr. Lane  2/18/15 - CR OV with Kandi Lofton NP   DISCHARGE SUMMARY from hospital N/A    DISCHARGE REPORT from the ER N/A    OPERATIVE REPORT [facility]:  10/8/15 - OP Note for LAPAROSCOPIC CHOLECYSTECTOMY with Dr. Estrada   MEDICATION LIST Internal    LABS     BIOPSIES/PATHOLOGY RELATED TO DIAGNOSIS [facility]:  8/17/20 - Colon Biopsy (Case: [id number])  10/8/15 - Gallbladder Biopsy (Case: [id number])  2/11/13 - Colon Biopsy (Case: [id number])  * Additional in Epic   DIAGNOSTIC PROCEDURES     COLONOSCOPY Received [facility]:  8/17/20 - Colonoscopy  3/27/17 - Colonoscopy   UPPER ENDOSCOPY (EGD) Received [facility]:  6/22/15 - EGD   IMAGING (DISC & REPORT)      XRAY [facility]:  9/3/20 - XR Pelvis/Hip   ULTRASOUND  (ENDOANAL/ENDORECTAL) [facility]:  9/11/15 - US Abdomen

## 2023-02-06 ENCOUNTER — TELEPHONE (OUTPATIENT)
Dept: INTERNAL MEDICINE | Facility: CLINIC | Age: 83
End: 2023-02-06

## 2023-02-06 ENCOUNTER — HOSPITAL ENCOUNTER (OUTPATIENT)
Dept: PHYSICAL THERAPY | Facility: REHABILITATION | Age: 83
Discharge: HOME | End: 2023-02-06
Payer: MEDICARE

## 2023-02-06 DIAGNOSIS — I87.8 VENOUS STASIS: Primary | ICD-10-CM

## 2023-02-06 DIAGNOSIS — I89.0 SECONDARY LYMPHEDEMA: Primary | ICD-10-CM

## 2023-02-06 PROCEDURE — 97140 MANUAL THERAPY 1/> REGIONS: CPT | Mod: GP | Performed by: PHYSICAL THERAPIST

## 2023-02-06 NOTE — TELEPHONE ENCOUNTER
----- Message from Rachel Fontanez PT sent at 2/6/2023 10:07 AM CST -----  Regarding: Compression order  Dr. Hall,  I am seeing Gagan Nieto for LE lymphedema/swelling and would like him to be fitted for velcro compression garments. If you agree, please submit an order for the following    B LE knee high velcro compression garments, 20-30 mmHg    Thank you in advance,  Rachel Fontanez PT, DPT, CLT   2/6/2023

## 2023-02-13 ENCOUNTER — HOSPITAL ENCOUNTER (OUTPATIENT)
Dept: PHYSICAL THERAPY | Facility: REHABILITATION | Age: 83
Discharge: HOME | End: 2023-02-13
Payer: MEDICARE

## 2023-02-13 ENCOUNTER — TELEPHONE (OUTPATIENT)
Dept: PHYSICAL THERAPY | Facility: REHABILITATION | Age: 83
End: 2023-02-13

## 2023-02-13 DIAGNOSIS — I89.0 SECONDARY LYMPHEDEMA: Primary | ICD-10-CM

## 2023-02-13 PROCEDURE — 97140 MANUAL THERAPY 1/> REGIONS: CPT | Mod: GP | Performed by: PHYSICAL THERAPIST

## 2023-02-17 ENCOUNTER — HOSPITAL ENCOUNTER (OUTPATIENT)
Dept: PHYSICAL THERAPY | Facility: REHABILITATION | Age: 83
Discharge: HOME | End: 2023-02-17
Payer: MEDICARE

## 2023-02-17 DIAGNOSIS — I89.0 SECONDARY LYMPHEDEMA: Primary | ICD-10-CM

## 2023-02-17 PROCEDURE — 97140 MANUAL THERAPY 1/> REGIONS: CPT | Mod: GP | Performed by: PHYSICAL THERAPIST

## 2023-02-20 ENCOUNTER — HOSPITAL ENCOUNTER (OUTPATIENT)
Dept: PHYSICAL THERAPY | Facility: REHABILITATION | Age: 83
Discharge: HOME | End: 2023-02-20
Payer: MEDICARE

## 2023-02-20 ENCOUNTER — TELEPHONE (OUTPATIENT)
Dept: INTERNAL MEDICINE | Facility: CLINIC | Age: 83
End: 2023-02-20

## 2023-02-20 DIAGNOSIS — E78.2 MIXED HYPERLIPIDEMIA: Primary | ICD-10-CM

## 2023-02-20 DIAGNOSIS — I89.0 SECONDARY LYMPHEDEMA: Primary | ICD-10-CM

## 2023-02-20 PROCEDURE — 97140 MANUAL THERAPY 1/> REGIONS: CPT | Mod: GP | Performed by: PHYSICAL THERAPIST

## 2023-02-20 NOTE — TELEPHONE ENCOUNTER
General Call      Reason for Call:  Pt calling stating he now takes 10mg of the Ruvostatin -  Rx at pharmacy still has it as 5mg    [facility] [address]    Please advise    What are your questions or concerns:  n/a    Date of last appointment with provider: n/a    Okay to leave a detailed message?: Yes at Home number on file [phone] (home)

## 2023-02-22 RX ORDER — ROSUVASTATIN CALCIUM 10 MG/1
10 TABLET, COATED ORAL DAILY
Qty: 90 TABLET | Refills: 3 | Status: SHIPPED | OUTPATIENT
Start: 2023-02-22 | End: 2023-04-03

## 2023-02-22 NOTE — TELEPHONE ENCOUNTER
Rx pended for 10 mg tablets instead of 5 mg. Please approve request, patient already inform [facility]'ll [facility].

## 2023-02-24 ENCOUNTER — HOSPITAL ENCOUNTER (OUTPATIENT)
Dept: PHYSICAL THERAPY | Facility: REHABILITATION | Age: 83
Discharge: HOME | End: 2023-02-24
Payer: MEDICARE

## 2023-02-24 DIAGNOSIS — I89.0 SECONDARY LYMPHEDEMA: Primary | ICD-10-CM

## 2023-02-24 PROCEDURE — 97140 MANUAL THERAPY 1/> REGIONS: CPT | Mod: GP | Performed by: PHYSICAL THERAPIST

## 2023-03-10 ENCOUNTER — TELEPHONE (OUTPATIENT)
Dept: INTERNAL MEDICINE | Facility: CLINIC | Age: 83
End: 2023-03-10
Payer: MEDICARE

## 2023-03-10 NOTE — TELEPHONE ENCOUNTER
March 10, 2023    Home health orders was received via fax for Dr. Hall to sign.  Patient label was attached to paperwork and placed in provider's inbox to be signed.    Maria A Kitchen

## 2023-03-10 NOTE — TELEPHONE ENCOUNTER
March 10, 2023    Home health orders was picked up from outbox of Dr. Hall.  Paperwork has been reviewed and is complete.  Per initial initial request, this was sent via fax to [phone].     Maria A Kitchen

## 2023-03-16 ENCOUNTER — PRE VISIT (OUTPATIENT)
Dept: SURGERY | Facility: CLINIC | Age: 83
End: 2023-03-16

## 2023-03-16 ENCOUNTER — OFFICE VISIT (OUTPATIENT)
Dept: SURGERY | Facility: CLINIC | Age: 83
End: 2023-03-16
Payer: MEDICARE

## 2023-03-16 VITALS
HEIGHT: 67 IN | DIASTOLIC BLOOD PRESSURE: 72 MMHG | BODY MASS INDEX: 37.93 KG/M2 | HEART RATE: 73 BPM | OXYGEN SATURATION: 95 % | SYSTOLIC BLOOD PRESSURE: 111 MMHG | WEIGHT: 241.7 LBS

## 2023-03-16 DIAGNOSIS — K64.8 HEMORRHOID PROLAPSE: Primary | ICD-10-CM

## 2023-03-16 DIAGNOSIS — K59.09 OTHER CONSTIPATION: ICD-10-CM

## 2023-03-16 PROCEDURE — 46221 LIGATION OF HEMORRHOID(S): CPT | Performed by: NURSE PRACTITIONER

## 2023-03-16 PROCEDURE — 99203 OFFICE O/P NEW LOW 30 MIN: CPT | Mod: 25 | Performed by: NURSE PRACTITIONER

## 2023-03-16 ASSESSMENT — PAIN SCALES - GENERAL: PAINLEVEL: NO PAIN (0)

## 2023-03-16 NOTE — LETTER
"3/16/2023       RE: Gagan Nieto  [address]  [place] [address]     Dear Colleague,    Thank you for referring your patient, Gagan Nieto, to the [facility] [place] [facility]. Please see a copy of my visit note below.    Colon and Rectal Surgery Consult Clinic Note    Date: 3/16/2023     Referring provider:  Referred Self, MD  No address on file     RE: Gagan Nieto  : 1940  JIMMY: 3/16/2023    Gagan Nieto is a very pleasant 82 year old male who presents today for hemorrhoids.    HPI:  Has hemorrhoids that drop out and he has to manually reduce them. He has some occasional bleeding with these. Has some constipation. Has never had any anorectal surgeries.  Colonoscopy 2020 with one tubular adenoma.  Family history of mother and two brothers with colon cancer. Had genetic testing and this was negative.    Physical Examination: Exam was chaperoned by Andrés Barry, EMT-P   /72 (BP Location: Left arm, Patient Position: Sitting, Cuff Size: Adult Large)   Pulse 73   Ht 5' 7\"   Wt 241 lb 11.2 oz   SpO2 95%   BMI 37.86 kg/m    General: alert, oriented, in no acute distress, sitting comfortably  HEENT: mucous membranes moist  Perianal external examination:  Perianal skin: Intact with no excoriation or lichenification.  Lesions: No evidence of an external lesion, nodularity, or induration in the perianal region.  Eversion of buttocks: There was not evidence of an anal fissure. Details: N/A.  Skin tags or external hemorrhoids: Yes: some prolapsing tissue in the left posterior position.  Digital rectal examination: Was performed.   Sphincter tone: Good.  Palpable lesions: No.  Prostate: Normal.  Other: None.    Anoscopy: Was performed.   Hemorrhoids: Yes. Grade 2-3 internal hemorrhoids without active bleeding  Lesions: No    Procedures:  After discussing the risks and benefits, the patient " agreed to proceed with internal hemorrhoidal banding.    Prior to the start of the procedure and with procedural staff participation, I verbally confirmed the patient s identity using two indicators, relevant allergies, that the procedure was appropriate and matched the consent or emergent situation, and that the correct equipment/implants were available. Immediately prior to starting the procedure I conducted the Time Out with the procedural staff and re-confirmed the patient s name, procedure, and site/side. (The Joint Commission universal protocol was followed.)  Yes    Sedation (Moderate or Deep): None    A suction hemorrhoidal  was used to place a total of 1 band(s) in the left posterior position(s).    There was no significant bleeding. The patient tolerated the procedure well.    This procedure was performed under a collaborative privileging agreement with Dr. Armin Beaulieu, Chief Division of Colon and Rectal Surgery      Assessment/Plan: 82 year old male with hemorrhoid prolapse and constipation. Advised a daily fiber supplement and add Miralax in addition as needed for constipation. Discussed managing prolapsing hemorrhoids with hemorrhoidal banding. Discussed that several banding procedures may be needed and that this will not necessarily resolve the external hemorrhoidal skin tags. Patient states an understanding of this and states an understanding that there is a small risk of bleeding today and when the band falls off in 1-2 weeks. Also advised patient that there is a remote chance of infection. Recommended avoiding heavy lifting and remain off aspirin for two weeks. Patient verbalized an understanding of these risks and wished to proceed today. He tolerated banding. Will have him return to clinic anytime after 2 weeks for any persistent symptoms. Patient's questions were answered to his stated satisfaction and he is in agreement with this plan.       Medical history:  Past Medical History:    Diagnosis Date     Adenoma of large intestine     Recurrent.      Allergic rhinitis      Asymptomatic varicose veins     Created by Conversion      Benign neoplasm of colon     Created by Conversion      Calculus of gallbladder 9/15/2015     Calculus of kidney     Created by Conversion [facility] Annotation: Feb 13 2008 11:26AM - Puja Hinsonil: on HCTZ      Chronic constipation      GERD (gastroesophageal reflux disease)      Hearing loss of aging     Bilateral hearing aids.     Hematuria      Hyperlipidemia      Hyperlipidemia      Insomnia     Due to CPAP on Zolpidem     Insomnia      Internal hemorrhoids      Nephrolithiasis     Hence on HCTZ     Obesity      Obesity      TERE (obstructive sleep apnea) 5/8/2006    PSG at [place] 5/10/2006 AHI 16 supine predominat     Osteoarthritis      Perforation of tympanic membrane, unspecified     Created by Conversion      Recurrent major depression (H)     In remission. Chronically on Paroxetine     Recurrent major depression in full remission (H)      Redundant prepuce and phimosis     Created by Conversion      Sleep apnea 2001    CPAP     Tinnitus      Tympanic membrane perforation     Left TM - Old.     Uses hearing aid      UTI (lower urinary tract infection) 10/2012     Varicose veins     Left leg     Vitamin D deficiency      Vitamin D deficiency        Surgical history:  Past Surgical History:   Procedure Laterality Date     CIRCUMCISION       COLONOSCOPY      Multiple     ESOPHAGOGASTRODUODENOSCOPY  6/22/2015     HERNIORRHAPHY FEMORAL BILATERAL       INGUINAL HERNIA REPAIR Bilateral      LAPAROSCOPIC CHOLECYSTECTOMY N/A 10/8/2015    Procedure: CHOLECYSTECTOMY LAPAROSCOPIC;  Surgeon: Dimitri Estrada MD;  [place] [facility] [place]  Service:      [facility] TOTAL KNEE ARTHROPLASTY Right 8/31/2016    Procedure: RIGHT KNEE TOTAL ARTHROPLASTY;  Surgeon: Callum Hou MD;  Location: [facility] OR;  Service: Orthopedics       Problem list:    Patient Active Problem  List    Diagnosis Date Noted     Hypothyroidism, unspecified type 09/12/2022     Priority: Medium     Benign prostatic hyperplasia with urinary frequency 08/03/2022     Priority: Medium     Recurrent major depression in full remission (H) 08/09/2021     Priority: Medium     Formatting of this note might be different from the original.  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  Per old notes.  Previously on hydrochlorothiazide, now stopped       Vitamin D deficiency 10/21/2013     Priority: Medium     Primary insomnia 10/17/2013     Priority: Medium     Formatting of this note might be different from the original.  Created by Conversion       TERE on CPAP 10/17/2013     Priority: Medium     Formatting of this note might be different from the original.  uses C Pap machine.    Replacement Utility updated for latest IMO load         Medications:  Current Outpatient Medications   Medication Sig Dispense Refill     carboxymethylcellulose (REFRESH LIQUIGEL) 1 % ophthalmic solution Place 1 drop into both eyes 3 times daily.       fluticasone (FLONASE) 50 MCG/ACT nasal spray Spray 1 spray into both nostrils 2 times daily 24 g 3     levothyroxine (SYNTHROID/LEVOTHROID) 75 MCG tablet Take 1 tablet (75 mcg) by mouth daily 90 tablet 3     Melatonin 10 MG TABS tablet Take 1 tablet (10 mg) by mouth nightly as needed for sleep       PARoxetine (PAXIL) 20 MG tablet Take 1 tablet (20 mg) by mouth every morning 90 tablet 3     Polyethylene Glycol 3350 (MIRALAX PO)        rosuvastatin (CRESTOR)  "10 MG tablet Take 1 tablet (10 mg) by mouth daily 90 tablet 3     rosuvastatin (CRESTOR) 5 MG tablet Take 1 tablet (5 mg) by mouth daily 90 tablet 4     saw palmetto (SERENOA REPENS) 80 MG capsule Take 1 capsule (80 mg) by mouth daily       vitamin D3 (CHOLECALCIFEROL) 50 mcg (2000 units) tablet Take 2 tablets (100 mcg) by mouth daily         Allergies:  Allergies   Allergen Reactions     Seasonal Allergies        Family history:  Family History   Problem Relation Age of Onset     Cancer - colorectal Mother      Glaucoma Mother      Colon Cancer Mother      Alzheimer Disease Sister      Alzheimer Disease Sister      Dementia Sister      Dementia Sister      Cancer - colorectal Brother          in their 40's.     Cancer - colorectal Brother          in their 40's.     Colon Cancer Brother      Colon Cancer Brother      No Known Problems Daughter      No Known Problems Son      No Known Problems Son      Anesthesia Reaction No family hx of      Crohn's Disease No family hx of      Ulcerative Colitis No family hx of        Social history:  Social History     Tobacco Use     Smoking status: Never     Smokeless tobacco: Never   Substance Use Topics     Alcohol use: No    Marital status: .    Nursing Notes:   Andrés Barry, EMT  3/16/2023 10:28 AM  Signed  Chief Complaint   Patient presents with     New Patient     Hemorrhoids       Vitals:    23 1024   BP: 111/72   BP Location: Left arm   Patient Position: Sitting   Cuff Size: Adult Large   Pulse: 73   SpO2: 95%   Weight: 241 lb 11.2 oz   Height: 5' 7\"       Body mass index is 37.86 kg/m .     Andrés Barry, EMT- P         25 minutes spent on the date of encounter performing chart review, history and exam, documentation and further activities as noted above with an additional 2 minutes for anoscopy and 5 minutes for banding.     MELONY Miner, NP-C  Colon and Rectal Surgery   [facility]    This note was " created using speech recognition software and may contain unintended word substitutions.

## 2023-03-16 NOTE — PATIENT INSTRUCTIONS
Start a daily fiber supplement such as Citrucel or Metamucil powder. Start with once a day and slowly increase up to three times a day, if needed, over the next 4-6 weeks  Add Miralax in addition as needed.  Follow up in one month if hemorrhoids are still symptomatic

## 2023-03-16 NOTE — NURSING NOTE
"Chief Complaint   Patient presents with     New Patient     Hemorrhoids       Vitals:    03/16/23 1024   BP: 111/72   BP Location: Left arm   Patient Position: Sitting   Cuff Size: Adult Large   Pulse: 73   SpO2: 95%   Weight: 241 lb 11.2 oz   Height: 5' 7\"       Body mass index is 37.86 kg/m .     Andrés Barry, EMT- P    "

## 2023-03-16 NOTE — PROGRESS NOTES
"Colon and Rectal Surgery Consult Clinic Note    Date: 3/16/2023     Referring provider:  Referred Self, MD  No address on file     RE: Gagan Nieto  : 1940  JIMMY: 3/16/2023    Gagan Nieto is a very pleasant 82 year old male who presents today for hemorrhoids.    HPI:  Has hemorrhoids that drop out and he has to manually reduce them. He has some occasional bleeding with these. Has some constipation. Has never had any anorectal surgeries.  Colonoscopy 2020 with one tubular adenoma.  Family history of mother and two brothers with colon cancer. Had genetic testing and this was negative.    Physical Examination: Exam was chaperoned by Andrés Barry, EMT-P   /72 (BP Location: Left arm, Patient Position: Sitting, Cuff Size: Adult Large)   Pulse 73   Ht 5' 7\"   Wt 241 lb 11.2 oz   SpO2 95%   BMI 37.86 kg/m    General: alert, oriented, in no acute distress, sitting comfortably  HEENT: mucous membranes moist  Perianal external examination:  Perianal skin: Intact with no excoriation or lichenification.  Lesions: No evidence of an external lesion, nodularity, or induration in the perianal region.  Eversion of buttocks: There was not evidence of an anal fissure. Details: N/A.  Skin tags or external hemorrhoids: Yes: some prolapsing tissue in the left posterior position.  Digital rectal examination: Was performed.   Sphincter tone: Good.  Palpable lesions: No.  Prostate: Normal.  Other: None.    Anoscopy: Was performed.   Hemorrhoids: Yes. Grade 2-3 internal hemorrhoids without active bleeding  Lesions: No    Procedures:  After discussing the risks and benefits, the patient agreed to proceed with internal hemorrhoidal banding.    Prior to the start of the procedure and with procedural staff participation, I verbally confirmed the patient s identity using two indicators, relevant allergies, that the procedure was appropriate and matched the consent or emergent situation, and that the correct " equipment/implants were available. Immediately prior to starting the procedure I conducted the Time Out with the procedural staff and re-confirmed the patient s name, procedure, and site/side. (The Joint Commission universal protocol was followed.)  Yes    Sedation (Moderate or Deep): None    A suction hemorrhoidal  was used to place a total of 1 band(s) in the left posterior position(s).    There was no significant bleeding. The patient tolerated the procedure well.    This procedure was performed under a collaborative privileging agreement with Dr. Armin Beaulieu, Chief Division of Colon and Rectal Surgery      Assessment/Plan: 82 year old male with hemorrhoid prolapse and constipation. Advised a daily fiber supplement and add Miralax in addition as needed for constipation. Discussed managing prolapsing hemorrhoids with hemorrhoidal banding. Discussed that several banding procedures may be needed and that this will not necessarily resolve the external hemorrhoidal skin tags. Patient states an understanding of this and states an understanding that there is a small risk of bleeding today and when the band falls off in 1-2 weeks. Also advised patient that there is a remote chance of infection. Recommended avoiding heavy lifting and remain off aspirin for two weeks. Patient verbalized an understanding of these risks and wished to proceed today. He tolerated banding. Will have him return to clinic anytime after 2 weeks for any persistent symptoms. Patient's questions were answered to his stated satisfaction and he is in agreement with this plan.       Medical history:  Past Medical History:   Diagnosis Date     Adenoma of large intestine     Recurrent.      Allergic rhinitis      Asymptomatic varicose veins     Created by Conversion      Benign neoplasm of colon     Created by Conversion      Calculus of gallbladder 9/15/2015     Calculus of kidney     Created by Conversion [facility] Annotation: Feb 13 2008  11:26Puja Wayil: on HCTZ      Chronic constipation      GERD (gastroesophageal reflux disease)      Hearing loss of aging     Bilateral hearing aids.     Hematuria      Hyperlipidemia      Hyperlipidemia      Insomnia     Due to CPAP on Zolpidem     Insomnia      Internal hemorrhoids      Nephrolithiasis     Hence on HCTZ     Obesity      Obesity      TERE (obstructive sleep apnea) 5/8/2006    PSG at [place] 5/10/2006 AHI 16 supine predominat     Osteoarthritis      Perforation of tympanic membrane, unspecified     Created by Conversion      Recurrent major depression (H)     In remission. Chronically on Paroxetine     Recurrent major depression in full remission (H)      Redundant prepuce and phimosis     Created by Conversion      Sleep apnea 2001    CPAP     Tinnitus      Tympanic membrane perforation     Left TM - Old.     Uses hearing aid      UTI (lower urinary tract infection) 10/2012     Varicose veins     Left leg     Vitamin D deficiency      Vitamin D deficiency        Surgical history:  Past Surgical History:   Procedure Laterality Date     CIRCUMCISION       COLONOSCOPY      Multiple     ESOPHAGOGASTRODUODENOSCOPY  6/22/2015     HERNIORRHAPHY FEMORAL BILATERAL       INGUINAL HERNIA REPAIR Bilateral      LAPAROSCOPIC CHOLECYSTECTOMY N/A 10/8/2015    Procedure: CHOLECYSTECTOMY LAPAROSCOPIC;  Surgeon: Dimitri Estrada MD;  Location: [facility];  Service:      [facility] TOTAL KNEE ARTHROPLASTY Right 8/31/2016    Procedure: RIGHT KNEE TOTAL ARTHROPLASTY;  Surgeon: Callum Hou MD;  Location: [facility] OR;  Service: Orthopedics       Problem list:    Patient Active Problem List    Diagnosis Date Noted     Hypothyroidism, unspecified type 09/12/2022     Priority: Medium     Benign prostatic hyperplasia with urinary frequency 08/03/2022     Priority: Medium     Recurrent major depression in full remission (H) 08/09/2021     Priority: Medium     Formatting of this note might be different from the  original.  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  Per old notes.  Previously on hydrochlorothiazide, now stopped       Vitamin D deficiency 10/21/2013     Priority: Medium     Primary insomnia 10/17/2013     Priority: Medium     Formatting of this note might be different from the original.  Created by Conversion       TERE on CPAP 10/17/2013     Priority: Medium     Formatting of this note might be different from the original.  uses C Pap machine.    Replacement Utility updated for latest IMO load         Medications:  Current Outpatient Medications   Medication Sig Dispense Refill     carboxymethylcellulose (REFRESH LIQUIGEL) 1 % ophthalmic solution Place 1 drop into both eyes 3 times daily.       fluticasone (FLONASE) 50 MCG/ACT nasal spray Spray 1 spray into both nostrils 2 times daily 24 g 3     levothyroxine (SYNTHROID/LEVOTHROID) 75 MCG tablet Take 1 tablet (75 mcg) by mouth daily 90 tablet 3     Melatonin 10 MG TABS tablet Take 1 tablet (10 mg) by mouth nightly as needed for sleep       PARoxetine (PAXIL) 20 MG tablet Take 1 tablet (20 mg) by mouth every morning 90 tablet 3     Polyethylene Glycol 3350 (MIRALAX PO)        rosuvastatin (CRESTOR) 10 MG tablet Take 1 tablet (10 mg) by mouth daily 90 tablet 3     rosuvastatin (CRESTOR) 5 MG tablet Take 1 tablet (5 mg) by mouth daily 90 tablet 4     saw palmetto (SERENOA REPENS) 80 MG capsule Take 1 capsule (80 mg) by mouth daily       vitamin D3 (CHOLECALCIFEROL) 50 mcg (2000 units) tablet Take 2 tablets (100 mcg) by  "mouth daily         Allergies:  Allergies   Allergen Reactions     Seasonal Allergies        Family history:  Family History   Problem Relation Age of Onset     Cancer - colorectal Mother      Glaucoma Mother      Colon Cancer Mother      Alzheimer Disease Sister      Alzheimer Disease Sister      Dementia Sister      Dementia Sister      Cancer - colorectal Brother          in their 40's.     Cancer - colorectal Brother          in their 40's.     Colon Cancer Brother      Colon Cancer Brother      No Known Problems Daughter      No Known Problems Son      No Known Problems Son      Anesthesia Reaction No family hx of      Crohn's Disease No family hx of      Ulcerative Colitis No family hx of        Social history:  Social History     Tobacco Use     Smoking status: Never     Smokeless tobacco: Never   Substance Use Topics     Alcohol use: No    Marital status: .    Nursing Notes:   Andrés Barry, EMT  3/16/2023 10:28 AM  Signed  Chief Complaint   Patient presents with     New Patient     Hemorrhoids       Vitals:    23 1024   BP: 111/72   BP Location: Left arm   Patient Position: Sitting   Cuff Size: Adult Large   Pulse: 73   SpO2: 95%   Weight: 241 lb 11.2 oz   Height: 5' 7\"       Body mass index is 37.86 kg/m .     Andrés Barry, EMT- P         25 minutes spent on the date of encounter performing chart review, history and exam, documentation and further activities as noted above with an additional 2 minutes for anoscopy and 5 minutes for banding.     MELONY Miner, NP-C  Colon and Rectal Surgery   [facility]    This note was created using speech recognition software and may contain unintended word substitutions.    "

## 2023-03-20 NOTE — PROGRESS NOTES
[facility]    Outpatient Physical Therapy Discharge Note  Patient: Gagan Nieto  : 1940    Beginning/End Dates of Reporting Period:  22 to 23    Referring Provider: Aurora Herndon Diagnosis: Secondary lymphedema     Client Self Report: Pt reports that he hasn't gotten his velcro wraps yet.    Objective Measurements:  See last treatment session     Outcome Measures (most recent score):  Lymphedema Life Impact Scale (score range 0-72). A higher score indicates greater impairment.: 8    Goals:  See last treatment session     Plan:  Discharge from therapy.    Discharge:    Reason for Discharge: Pt called to report that his new velcro wraps fit well and he does not want to return for final visit to have them assessed.     Equipment Issued: N/A    Discharge Plan: Patient to continue home program.    Rachel Fontanez, PT, DPT, CLT  3/20/2023

## 2023-03-20 NOTE — ADDENDUM NOTE
Encounter addended by: Rachel Fontanez, PT on: 3/20/2023 9:36 AM   Actions taken: Episode resolved, Clinical Note Signed

## 2023-04-03 ENCOUNTER — OFFICE VISIT (OUTPATIENT)
Dept: INTERNAL MEDICINE | Facility: CLINIC | Age: 83
End: 2023-04-03
Payer: MEDICARE

## 2023-04-03 VITALS
RESPIRATION RATE: 14 BRPM | HEART RATE: 53 BPM | WEIGHT: 239.8 LBS | HEIGHT: 67 IN | BODY MASS INDEX: 37.64 KG/M2 | SYSTOLIC BLOOD PRESSURE: 120 MMHG | DIASTOLIC BLOOD PRESSURE: 70 MMHG | OXYGEN SATURATION: 89 % | TEMPERATURE: 97.9 F

## 2023-04-03 DIAGNOSIS — H72.93: Primary | ICD-10-CM

## 2023-04-03 DIAGNOSIS — E78.2 MIXED HYPERLIPIDEMIA: ICD-10-CM

## 2023-04-03 DIAGNOSIS — E03.9 HYPOTHYROIDISM, UNSPECIFIED TYPE: ICD-10-CM

## 2023-04-03 DIAGNOSIS — H66.93: Primary | ICD-10-CM

## 2023-04-03 PROBLEM — S90.229A SUBUNGUAL HEMATOMA OF TOE: Status: ACTIVE | Noted: 2023-03-02

## 2023-04-03 LAB
T4 FREE SERPL-MCNC: 0.97 NG/DL (ref 0.9–1.7)
TSH SERPL DL<=0.005 MIU/L-ACNC: 4.72 UIU/ML (ref 0.3–4.2)

## 2023-04-03 PROCEDURE — 99214 OFFICE O/P EST MOD 30 MIN: CPT | Performed by: INTERNAL MEDICINE

## 2023-04-03 PROCEDURE — 84443 ASSAY THYROID STIM HORMONE: CPT | Performed by: INTERNAL MEDICINE

## 2023-04-03 PROCEDURE — 36415 COLL VENOUS BLD VENIPUNCTURE: CPT | Performed by: INTERNAL MEDICINE

## 2023-04-03 PROCEDURE — 84439 ASSAY OF FREE THYROXINE: CPT | Performed by: INTERNAL MEDICINE

## 2023-04-03 RX ORDER — ROSUVASTATIN CALCIUM 20 MG/1
20 TABLET, COATED ORAL DAILY
Qty: 90 TABLET | Refills: 4 | Status: SHIPPED | OUTPATIENT
Start: 2023-04-03 | End: 2024-03-12

## 2023-04-03 RX ORDER — TRIAMCINOLONE ACETONIDE 1 MG/G
CREAM TOPICAL PRN
COMMUNITY
Start: 2023-03-07

## 2023-04-03 RX ORDER — HYDROCORTISONE 2.5 %
CREAM (GRAM) TOPICAL PRN
COMMUNITY
Start: 2023-03-13

## 2023-04-03 RX ORDER — AMOXICILLIN 500 MG/1
4 TABLET, FILM COATED ORAL PRN
COMMUNITY
Start: 2022-11-16

## 2023-04-03 RX ORDER — DESONIDE 0.5 MG/G
CREAM TOPICAL
COMMUNITY
Start: 2023-03-07 | End: 2024-03-12

## 2023-04-03 RX ORDER — LEVOTHYROXINE SODIUM 75 UG/1
75 TABLET ORAL DAILY
Qty: 90 TABLET | Refills: 4 | Status: SHIPPED | OUTPATIENT
Start: 2023-04-03 | End: 2023-04-04

## 2023-04-03 NOTE — PROGRESS NOTES
"  Office Visit - Follow Up   Gagan Nieto   82 year old male    Date of Visit: 4/3/2023    Chief Complaint   Patient presents with     Recheck Medication     RECHECK        Assessment and Plan   1. Chronic otitis media with posterior perforation, bilateral  Agree with ENT visit, discussed antibiotic drops given the drainage but he would prefer to see ENT and have those recommendations first which is very reasonable    2. Mixed hyperlipidemia  Increase rosuvastatin dose as below  - rosuvastatin (CRESTOR) 20 MG tablet; Take 1 tablet (20 mg) by mouth daily  Dispense: 90 tablet; Refill: 4    3. Hypothyroidism, unspecified type  Continue levothyroxine check thyroid studies  - levothyroxine (SYNTHROID/LEVOTHROID) 75 MCG tablet; Take 1 tablet (75 mcg) by mouth daily  Dispense: 90 tablet; Refill: 4  - TSH with free T4 reflex; Future  - TSH with free T4 reflex    Return in about 4 months (around 8/3/2023) for Routine preventive.     History of Present Illness   This 82 year old man comes in for follow-up.  Overall he is doing well.  Tolerating his medications.  Has had some drainage especially from the left ear.  Has an appointment with Dr. Sullivan with ENT this week.  Known chronic perforations.  Wears hearing aids.  No fever or chills.  No significant pain.       Physical Exam   General Appearance:   No acute distress    /70 (BP Location: Left arm, Patient Position: Sitting, Cuff Size: Adult Large)   Pulse 53   Temp 97.9  F (36.6  C) (Tympanic)   Resp 14   Ht 1.689 m (5' 6.5\")   Wt 108.8 kg (239 lb 12.8 oz)   SpO2 (!) 89%   BMI 38.12 kg/m      Bilateral perforations of the tympanic membranes     Additional Information   Current Outpatient Medications   Medication Sig Dispense Refill     amoxicillin (AMOXIL) 500 MG tablet TAKE 4 TABLETS BY MOUTH 1 HOUR BEFORE DENTAL APPT       carboxymethylcellulose (REFRESH LIQUIGEL) 1 % ophthalmic solution Place 1 drop into both eyes 3 times daily.       desonide " (DESOWEN) 0.05 % external cream        fluticasone (FLONASE) 50 MCG/ACT nasal spray Spray 1 spray into both nostrils 2 times daily 24 g 3     hydrocortisone 2.5 % cream PLEASE SEE ATTACHED FOR DETAILED DIRECTIONS       levothyroxine (SYNTHROID/LEVOTHROID) 75 MCG tablet Take 1 tablet (75 mcg) by mouth daily 90 tablet 4     Melatonin 10 MG TABS tablet Take 1 tablet (10 mg) by mouth nightly as needed for sleep       PARoxetine (PAXIL) 20 MG tablet Take 1 tablet (20 mg) by mouth every morning 90 tablet 3     Polyethylene Glycol 3350 (MIRALAX PO)        rosuvastatin (CRESTOR) 20 MG tablet Take 1 tablet (20 mg) by mouth daily 90 tablet 4     saw palmetto (SERENOA REPENS) 80 MG capsule Take 1 capsule (80 mg) by mouth daily       triamcinolone (KENALOG) 0.1 % external cream APPLY THREE TIME TO ECZEMA AFFECTED AREAS UNTIL CLEAR.       vitamin D3 (CHOLECALCIFEROL) 50 mcg (2000 units) tablet Take 2 tablets (100 mcg) by mouth daily       Allergies   Allergen Reactions     Seasonal Allergies        Time:      Miguelito Hall MD  Answers for HPI/ROS submitted by the patient on 4/3/2023  What is the reason for your visit today? : FOLLOW UP  How many servings of fruits and vegetables do you eat daily?: 2-3  On average, how many sweetened beverages do you drink each day (Examples: soda, juice, sweet tea, etc.  Do NOT count diet or artificially sweetened beverages)?: 0  How many minutes a day do you exercise enough to make your heart beat faster?: 30 to 60  How many days a week do you exercise enough to make your heart beat faster?: 5  How many days per week do you miss taking your medication?: 0

## 2023-04-04 RX ORDER — LEVOTHYROXINE SODIUM 100 UG/1
100 TABLET ORAL DAILY
Qty: 90 TABLET | Refills: 4 | Status: SHIPPED | OUTPATIENT
Start: 2023-04-04 | End: 2024-01-16

## 2023-06-05 ENCOUNTER — TELEPHONE (OUTPATIENT)
Dept: INTERNAL MEDICINE | Facility: CLINIC | Age: 83
End: 2023-06-05
Payer: MEDICARE

## 2023-06-05 NOTE — TELEPHONE ENCOUNTER
June 5, 2023    [facility] was received via fax for Dr. Hall to sign.  Patient label was attached to paperwork and placed in provider's inbox to be signed.    Mandy Sarkar

## 2023-06-09 NOTE — TELEPHONE ENCOUNTER
June 9, 2023    [facility] was picked up from outbox of Dr. Hall.  Paperwork has been reviewed and is complete.  Per initial initial request, this was sent via fax to [phone].     Mandy Sarkar

## 2023-06-15 ENCOUNTER — TELEPHONE (OUTPATIENT)
Dept: INTERNAL MEDICINE | Facility: CLINIC | Age: 83
End: 2023-06-15
Payer: MEDICARE

## 2023-06-15 NOTE — TELEPHONE ENCOUNTER
Ada 15, 2023    [facility] Order [id number] was received via fax for Dr. Hall to sign.  Patient label was attached to paperwork and placed in provider's inbox to be signed.    Maria A Kitchen

## 2023-06-23 NOTE — TELEPHONE ENCOUNTER
June 23, 2023    [facility] Orders was picked up from outbox of Dr. Hall.  Paperwork has been reviewed and is complete.  Per initial initial request, this was sent via fax to [phone].     Maria A Kitchen

## 2023-07-05 ENCOUNTER — PATIENT OUTREACH (OUTPATIENT)
Dept: CARE COORDINATION | Facility: CLINIC | Age: 83
End: 2023-07-05
Payer: MEDICARE

## 2023-07-19 ENCOUNTER — PATIENT OUTREACH (OUTPATIENT)
Dept: CARE COORDINATION | Facility: CLINIC | Age: 83
End: 2023-07-19
Payer: MEDICARE

## 2023-08-03 ENCOUNTER — OFFICE VISIT (OUTPATIENT)
Dept: INTERNAL MEDICINE | Facility: CLINIC | Age: 83
End: 2023-08-03
Payer: MEDICARE

## 2023-08-03 VITALS
HEART RATE: 51 BPM | BODY MASS INDEX: 37.67 KG/M2 | TEMPERATURE: 98.1 F | SYSTOLIC BLOOD PRESSURE: 124 MMHG | HEIGHT: 67 IN | OXYGEN SATURATION: 94 % | DIASTOLIC BLOOD PRESSURE: 72 MMHG | WEIGHT: 240 LBS

## 2023-08-03 DIAGNOSIS — N20.0 URIC ACID KIDNEY STONE: ICD-10-CM

## 2023-08-03 DIAGNOSIS — N40.1 BENIGN PROSTATIC HYPERPLASIA WITH URINARY FREQUENCY: ICD-10-CM

## 2023-08-03 DIAGNOSIS — H90.6 MIXED CONDUCTIVE AND SENSORINEURAL HEARING LOSS OF BOTH EARS: ICD-10-CM

## 2023-08-03 DIAGNOSIS — R35.0 BENIGN PROSTATIC HYPERPLASIA WITH URINARY FREQUENCY: ICD-10-CM

## 2023-08-03 DIAGNOSIS — E66.01 MORBID OBESITY (H): ICD-10-CM

## 2023-08-03 DIAGNOSIS — E03.9 HYPOTHYROIDISM, UNSPECIFIED TYPE: ICD-10-CM

## 2023-08-03 DIAGNOSIS — E78.2 MIXED HYPERLIPIDEMIA: ICD-10-CM

## 2023-08-03 DIAGNOSIS — F51.01 PRIMARY INSOMNIA: ICD-10-CM

## 2023-08-03 DIAGNOSIS — Z00.00 ANNUAL PHYSICAL EXAM: Primary | ICD-10-CM

## 2023-08-03 DIAGNOSIS — G47.33 OSA ON CPAP: ICD-10-CM

## 2023-08-03 DIAGNOSIS — K21.9 GASTROESOPHAGEAL REFLUX DISEASE WITHOUT ESOPHAGITIS: ICD-10-CM

## 2023-08-03 DIAGNOSIS — F33.42 RECURRENT MAJOR DEPRESSION IN FULL REMISSION (H): ICD-10-CM

## 2023-08-03 PROBLEM — S90.229A SUBUNGUAL HEMATOMA OF TOE: Status: RESOLVED | Noted: 2023-03-02 | Resolved: 2023-08-03

## 2023-08-03 LAB
ALBUMIN SERPL BCG-MCNC: 4.3 G/DL (ref 3.5–5.2)
ALP SERPL-CCNC: 60 U/L (ref 40–129)
ALT SERPL W P-5'-P-CCNC: 21 U/L (ref 0–70)
ANION GAP SERPL CALCULATED.3IONS-SCNC: 9 MMOL/L (ref 7–15)
AST SERPL W P-5'-P-CCNC: 29 U/L (ref 0–45)
BILIRUB SERPL-MCNC: 0.6 MG/DL
BUN SERPL-MCNC: 22.7 MG/DL (ref 8–23)
CALCIUM SERPL-MCNC: 9.6 MG/DL (ref 8.8–10.2)
CHLORIDE SERPL-SCNC: 106 MMOL/L (ref 98–107)
CHOLEST SERPL-MCNC: 120 MG/DL
CREAT SERPL-MCNC: 1.01 MG/DL (ref 0.67–1.17)
DEPRECATED HCO3 PLAS-SCNC: 27 MMOL/L (ref 22–29)
ERYTHROCYTE [DISTWIDTH] IN BLOOD BY AUTOMATED COUNT: 13.1 % (ref 10–15)
GFR SERPL CREATININE-BSD FRML MDRD: 73 ML/MIN/1.73M2
GLUCOSE SERPL-MCNC: 91 MG/DL (ref 70–99)
HCT VFR BLD AUTO: 44.4 % (ref 40–53)
HDLC SERPL-MCNC: 57 MG/DL
HGB BLD-MCNC: 14.4 G/DL (ref 13.3–17.7)
LDLC SERPL CALC-MCNC: 53 MG/DL
MCH RBC QN AUTO: 29.9 PG (ref 26.5–33)
MCHC RBC AUTO-ENTMCNC: 32.4 G/DL (ref 31.5–36.5)
MCV RBC AUTO: 92 FL (ref 78–100)
NONHDLC SERPL-MCNC: 63 MG/DL
PLATELET # BLD AUTO: 125 10E3/UL (ref 150–450)
POTASSIUM SERPL-SCNC: 4.7 MMOL/L (ref 3.4–5.3)
PROT SERPL-MCNC: 6.7 G/DL (ref 6.4–8.3)
RBC # BLD AUTO: 4.81 10E6/UL (ref 4.4–5.9)
SODIUM SERPL-SCNC: 142 MMOL/L (ref 136–145)
TRIGL SERPL-MCNC: 52 MG/DL
TSH SERPL DL<=0.005 MIU/L-ACNC: 1.76 UIU/ML (ref 0.3–4.2)
WBC # BLD AUTO: 6.2 10E3/UL (ref 4–11)

## 2023-08-03 PROCEDURE — 36415 COLL VENOUS BLD VENIPUNCTURE: CPT | Performed by: INTERNAL MEDICINE

## 2023-08-03 PROCEDURE — 80061 LIPID PANEL: CPT | Performed by: INTERNAL MEDICINE

## 2023-08-03 PROCEDURE — 84443 ASSAY THYROID STIM HORMONE: CPT | Performed by: INTERNAL MEDICINE

## 2023-08-03 PROCEDURE — 99214 OFFICE O/P EST MOD 30 MIN: CPT | Mod: 25 | Performed by: INTERNAL MEDICINE

## 2023-08-03 PROCEDURE — 85027 COMPLETE CBC AUTOMATED: CPT | Performed by: INTERNAL MEDICINE

## 2023-08-03 PROCEDURE — G0439 PPPS, SUBSEQ VISIT: HCPCS | Performed by: INTERNAL MEDICINE

## 2023-08-03 PROCEDURE — 80053 COMPREHEN METABOLIC PANEL: CPT | Performed by: INTERNAL MEDICINE

## 2023-08-03 RX ORDER — PAROXETINE 20 MG/1
20 TABLET, FILM COATED ORAL EVERY MORNING
Qty: 90 TABLET | Refills: 3 | Status: SHIPPED | OUTPATIENT
Start: 2023-08-03 | End: 2023-12-29

## 2023-08-03 NOTE — PATIENT INSTRUCTIONS
Patient Education   Personalized Prevention Plan  You are due for the preventive services outlined below.  Your care team is available to assist you in scheduling these services.  If you have already completed any of these items, please share that information with your care team to update in your medical record.  Health Maintenance Due   Topic Date Due     COVID-19 Vaccine (5 - Pfizer series) 03/03/2023     Annual Wellness Visit  08/03/2023

## 2023-08-03 NOTE — PROGRESS NOTES
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    Are you in the first 12 months of your Medicare coverage?  No    History of Present Illness       Hyperlipidemia:  He presents for follow up of hyperlipidemia.   He is taking medication to lower cholesterol. He is not having myalgia or other side effects to statin medications.    He eats 2-3 servings of fruits and vegetables daily.He consumes 0 sweetened beverage(s) daily.He exercises with enough effort to increase his heart rate 20 to 29 minutes per day.  He exercises with enough effort to increase his heart rate 3 or less days per week.   He is taking medications regularly.    Healthy Habits  Ability to successfully perform ADLs: Yes  Hearing impairment: Profound  Home Safety: Safe      8/3/2022    11:47 AM   PHQ-2 ( 1999 Pfizer)   Q1: Little interest or pleasure in doing things 0   Q2: Feeling down, depressed or hopeless 0   PHQ-2 Score 0   Q1: Little interest or pleasure in doing things Not at all   Q2: Feeling down, depressed or hopeless Not at all   PHQ-2 Score 0         8/3/2023    12:21 PM   PHQ   PHQ-9 Total Score 0   Q9: Thoughts of better off dead/self-harm past 2 weeks Not at all     Fall Risk:     Have you ever done Advance Care Planning? (For example, a Health Directive, POLST, or a discussion with a medical provider or your loved ones about your wishes): Yes, advance care planning is on file.       Fall risk  Fallen 2 or more times in the past year?: No  Any fall with injury in the past year?: No    Cognitive Screening   1) Repeat 3 items (Leader, Season, Table)    2) Clock draw: NORMAL  3) 3 item recall: Recalls 3 objects  Results: NORMAL clock, 1-2 items recalled: COGNITIVE IMPAIRMENT LESS LIKELY    Mini-CogTM Copyright [employer]. Licensed by the author for use in [facility]; reprinted with permission (robyn@.[facility] [place]). All rights reserved.      Do you have sleep apnea, excessive snoring or daytime drowsiness?  ": yes    Reviewed and updated as needed this visit by clinical staff   Tobacco  Allergies  Meds              Reviewed and updated as needed this visit by Provider                 Social History     Tobacco Use    Smoking status: Never    Smokeless tobacco: Never   Substance Use Topics    Alcohol use: No             8/3/2022    11:47 AM   Alcohol Use   Prescreen: >3 drinks/day or >7 drinks/week? No     Do you have a current opioid prescription? No  Do you use any other controlled substances or medications that are not prescribed by a provider? None    Current providers sharing in care for this patient include:   Patient Care Team:  Miguelito Hall MD as PCP - General  Kandi Lofton APRN CNP as Nurse Practitioner  Jefferson Mancia MD as MD (Colon and Rectal Surgery)  Miguelito Hall MD as Assigned PCP  Kandi Lofton APRN CNP as Assigned Surgical Provider    The following health maintenance items are reviewed in Epic and correct as of today:  Health Maintenance   Topic Date Due    COVID-19 Vaccine (5 - Pfizer series) 03/03/2023    MEDICARE ANNUAL WELLNESS VISIT  08/03/2023    ANNUAL REVIEW OF HM ORDERS  08/03/2023    INFLUENZA VACCINE (1) 09/01/2023    PHQ-9  02/03/2024    TSH W/FREE T4 REFLEX  04/03/2024    FALL RISK ASSESSMENT  08/03/2024    ADVANCE CARE PLANNING  08/03/2027    Pneumococcal Vaccine: 65+ Years  Completed    ZOSTER IMMUNIZATION  Completed    DEPRESSION ACTION PLAN  Addressed    IPV IMMUNIZATION  Aged Out    MENINGITIS IMMUNIZATION  Aged Out    DTAP/TDAP/TD IMMUNIZATION  Discontinued       Review of Systems  Constitutional, HEENT, cardiovascular, pulmonary, GI, , musculoskeletal, neuro, skin, endocrine and psych systems are negative, except as otherwise noted.    OBJECTIVE:   /72 (BP Location: Left arm, Patient Position: Sitting, Cuff Size: Adult Small)   Pulse 51   Temp 98.1  F (36.7  C) (Tympanic)   Ht 1.689 m (5' 6.5\")   Wt 108.9 kg (240 lb)   SpO2 " "94%   BMI 38.16 kg/m   Estimated body mass index is 38.16 kg/m  as calculated from the following:    Height as of this encounter: 1.689 m (5' 6.5\").    Weight as of this encounter: 108.9 kg (240 lb).  Physical Exam  EYES: Eyelids, conjunctiva, and sclera were normal. Pupils were normal. Cornea, iris, and lens were normal bilaterally.  HEAD, EARS, NOSE, MOUTH, AND THROAT: Head and face were normal. Hearing was normal to voice and the ears were normal to external exam. Nose appearance was normal and there was no discharge. Oropharynx was normal.  NECK: Neck appearance was normal. There were no neck masses and the thyroid was not enlarged.  RESPIRATORY: Breathing pattern was normal and the chest moved symmetrically.  Lung sounds were normal and there were no abnormal sounds.  CARDIOVASCULAR: Heart rate and rhythm were normal.  S1 and S2 were normal and there were no extra sounds or murmurs. Peripheral pulses in arms and legs were normal.  Jugular venous pressure was normal.  There was no peripheral edema.  GASTROINTESTINAL: The abdomen was normal in contour.    NEUROLOGIC: The patient was alert and oriented to person, place, time, and circumstance. Speech was normal. Cranial nerves were normal. Motor strength was normal for age.   PSYCHIATRIC:  Mood and affect were normal and the patient had normal recent and remote memory. The patient's judgment and insight were normal.    ASSESSMENT / PLAN:     1. Annual physical exam  This is an 83-year-old man with issues as discussed below    2. Hypothyroidism, unspecified type  Continue levothyroxine 100 mcg daily, adjust dose as needed  - TSH; Future  - TSH    3. Recurrent major depression in full remission (H)  Stable  - PARoxetine (PAXIL) 20 MG tablet; Take 1 tablet (20 mg) by mouth every morning  Dispense: 90 tablet; Refill: 3    4. Benign prostatic hyperplasia with urinary frequency  Uses saw palmetto stable    5. Gastroesophageal reflux disease without " "esophagitis  Stable    6. Hard of Hearing - Hearing Aids  Has hearing aids    7. TERE on CPAP    8. Primary insomnia  Stable    9. Uric acid kidney stone  Stable    10. Mixed hyperlipidemia  Recently started rosuvastatin tolerating  - CBC with platelets; Future  - Comprehensive metabolic panel; Future  - Lipid panel reflex to direct LDL Fasting; Future  - CBC with platelets  - Comprehensive metabolic panel  - Lipid panel reflex to direct LDL Fasting    11. Morbid obesity (H)  COUNSELING:  Reviewed preventive health counseling, as reflected in patient instructions      BMI:   Estimated body mass index is 38.16 kg/m  as calculated from the following:    Height as of this encounter: 1.689 m (5' 6.5\").    Weight as of this encounter: 108.9 kg (240 lb).   Weight management plan: Discussed healthy diet and exercise guidelines      He reports that he has never smoked. He has never used smokeless tobacco.      Appropriate preventive services were discussed with this patient, including applicable screening as appropriate for cardiovascular disease, diabetes, osteopenia/osteoporosis, and glaucoma.  As appropriate for age/gender, discussed screening for colorectal cancer, prostate cancer, breast cancer, and cervical cancer. Checklist reviewing preventive services available has been given to the patient.    Reviewed patients plan of care and provided an AVS. The Basic Care Plan (routine screening as documented in Health Maintenance) for Gagan meets the Care Plan requirement. This Care Plan has been established and reviewed with the Patient.          Miguelito Hall MD  [facility]    Identified Health Risks:  I have reviewed Opioid Use Disorder and Substance Use Disorder risk factors and made any needed referrals.   "

## 2023-08-03 NOTE — LETTER
August 4, 2023      Father Bruce FUENTES Gerson  [address]  [place] [address]        Dear ,    We are writing to inform you of your test results.    Your labs all look okay, which is excellent    Resulted Orders   CBC with platelets   Result Value Ref Range    WBC Count 6.2 4.0 - 11.0 10e3/uL    RBC Count 4.81 4.40 - 5.90 10e6/uL    Hemoglobin 14.4 13.3 - 17.7 g/dL    Hematocrit 44.4 40.0 - 53.0 %    MCV 92 78 - 100 fL    MCH 29.9 26.5 - 33.0 pg    MCHC 32.4 31.5 - 36.5 g/dL    RDW 13.1 10.0 - 15.0 %    Platelet Count 125 (L) 150 - 450 10e3/uL   Comprehensive metabolic panel   Result Value Ref Range    Sodium 142 136 - 145 mmol/L    Potassium 4.7 3.4 - 5.3 mmol/L    Chloride 106 98 - 107 mmol/L    Carbon Dioxide (CO2) 27 22 - 29 mmol/L    Anion Gap 9 7 - 15 mmol/L    Urea Nitrogen 22.7 8.0 - 23.0 mg/dL    Creatinine 1.01 0.67 - 1.17 mg/dL    Calcium 9.6 8.8 - 10.2 mg/dL    Glucose 91 70 - 99 mg/dL    Alkaline Phosphatase 60 40 - 129 U/L    AST 29 0 - 45 U/L      Comment:      Reference intervals for this test were updated on 6/12/2023 to more accurately reflect our healthy population. There may be differences in the flagging of prior results with similar values performed with this method. Interpretation of those prior results can be made in the context of the updated reference intervals.    ALT 21 0 - 70 U/L      Comment:      Reference intervals for this test were updated on 6/12/2023 to more accurately reflect our healthy population. There may be differences in the flagging of prior results with similar values performed with this method. Interpretation of those prior results can be made in the context of the updated reference intervals.      Protein Total 6.7 6.4 - 8.3 g/dL    Albumin 4.3 3.5 - 5.2 g/dL    Bilirubin Total 0.6 <=1.2 mg/dL    GFR Estimate 73 >60 mL/min/1.73m2   Lipid panel reflex to direct LDL Fasting   Result Value Ref Range    Cholesterol 120 <200 mg/dL    Triglycerides 52 <150  mg/dL    Direct Measure HDL 57 >=40 mg/dL    LDL Cholesterol Calculated 53 <=100 mg/dL    Non HDL Cholesterol 63 <130 mg/dL    Narrative    Cholesterol  Desirable:  <200 mg/dL    Triglycerides  Normal:  Less than 150 mg/dL  Borderline High:  150-199 mg/dL  High:  200-499 mg/dL  Very High:  Greater than or equal to 500 mg/dL    Direct Measure HDL  Female:  Greater than or equal to 50 mg/dL   Male:  Greater than or equal to 40 mg/dL    LDL Cholesterol  Desirable:  <100mg/dL  Above Desirable:  100-129 mg/dL   Borderline High:  130-159 mg/dL   High:  160-189 mg/dL   Very High:  >= 190 mg/dL    Non HDL Cholesterol  Desirable:  130 mg/dL  Above Desirable:  130-159 mg/dL  Borderline High:  160-189 mg/dL  High:  190-219 mg/dL  Very High:  Greater than or equal to 220 mg/dL   TSH   Result Value Ref Range    TSH 1.76 0.30 - 4.20 uIU/mL       If you have any questions or concerns, please call the clinic at the number listed above.       Sincerely,      Miguelito Hall MD

## 2023-09-14 ENCOUNTER — TELEPHONE (OUTPATIENT)
Dept: NURSING | Facility: CLINIC | Age: 83
End: 2023-09-14
Payer: MEDICARE

## 2023-09-14 NOTE — TELEPHONE ENCOUNTER
Pt reports having severe stomach pain over the last few days. Declined triage at this time stating he only wants to schedule an appointment with his PCP.           Bety Kaye RN on 9/14/2023 at 3:31 PM

## 2023-09-21 ENCOUNTER — TRANSFERRED RECORDS (OUTPATIENT)
Dept: HEALTH INFORMATION MANAGEMENT | Facility: CLINIC | Age: 83
End: 2023-09-21
Payer: MEDICARE

## 2023-09-25 ENCOUNTER — TRANSFERRED RECORDS (OUTPATIENT)
Dept: HEALTH INFORMATION MANAGEMENT | Facility: CLINIC | Age: 83
End: 2023-09-25

## 2023-09-26 ENCOUNTER — OFFICE VISIT (OUTPATIENT)
Dept: INTERNAL MEDICINE | Facility: CLINIC | Age: 83
End: 2023-09-26
Payer: MEDICARE

## 2023-09-26 VITALS
HEIGHT: 66 IN | OXYGEN SATURATION: 94 % | BODY MASS INDEX: 37.97 KG/M2 | SYSTOLIC BLOOD PRESSURE: 107 MMHG | DIASTOLIC BLOOD PRESSURE: 75 MMHG | HEART RATE: 62 BPM | RESPIRATION RATE: 12 BRPM | WEIGHT: 236.3 LBS | TEMPERATURE: 97.7 F

## 2023-09-26 DIAGNOSIS — K86.89 PANCREATIC CALCIFICATION: ICD-10-CM

## 2023-09-26 DIAGNOSIS — R10.13 DYSPEPSIA: ICD-10-CM

## 2023-09-26 DIAGNOSIS — R35.0 BENIGN PROSTATIC HYPERPLASIA WITH URINARY FREQUENCY: ICD-10-CM

## 2023-09-26 DIAGNOSIS — E66.01 MORBID OBESITY (H): ICD-10-CM

## 2023-09-26 DIAGNOSIS — G47.33 OSA ON CPAP: ICD-10-CM

## 2023-09-26 DIAGNOSIS — I71.21 ANEURYSM OF ASCENDING AORTA WITHOUT RUPTURE (H): ICD-10-CM

## 2023-09-26 DIAGNOSIS — F51.01 PRIMARY INSOMNIA: ICD-10-CM

## 2023-09-26 DIAGNOSIS — Z01.818 PREOPERATIVE EXAMINATION: Primary | ICD-10-CM

## 2023-09-26 DIAGNOSIS — F33.42 RECURRENT MAJOR DEPRESSION IN FULL REMISSION (H): ICD-10-CM

## 2023-09-26 DIAGNOSIS — N40.1 BENIGN PROSTATIC HYPERPLASIA WITH URINARY FREQUENCY: ICD-10-CM

## 2023-09-26 DIAGNOSIS — R91.8 PULMONARY NODULES: ICD-10-CM

## 2023-09-26 DIAGNOSIS — K21.9 GASTROESOPHAGEAL REFLUX DISEASE WITHOUT ESOPHAGITIS: ICD-10-CM

## 2023-09-26 DIAGNOSIS — R42 VERTIGO: ICD-10-CM

## 2023-09-26 DIAGNOSIS — E03.9 HYPOTHYROIDISM, UNSPECIFIED TYPE: ICD-10-CM

## 2023-09-26 PROCEDURE — 90662 IIV NO PRSV INCREASED AG IM: CPT | Performed by: INTERNAL MEDICINE

## 2023-09-26 PROCEDURE — 99215 OFFICE O/P EST HI 40 MIN: CPT | Mod: 25 | Performed by: INTERNAL MEDICINE

## 2023-09-26 PROCEDURE — G0008 ADMIN INFLUENZA VIRUS VAC: HCPCS | Performed by: INTERNAL MEDICINE

## 2023-09-26 ASSESSMENT — PAIN SCALES - GENERAL: PAINLEVEL: NO PAIN (0)

## 2023-09-26 NOTE — PROGRESS NOTES
Preoperative Consultation   Gagan Nieto   83 year old  male    Date of visit: 2023  Physician: Miguelito Hall MD    This is a preoperative consultation requested by Dr. Modesto Ochoa or [facility] colleague in preparation for upper endoscopy with EUS on 10/6/2023 at  [facility] .       Assessment and Plan   Gagan Nieto was seen in preoperative consultation in preparation for upper endoscopy with EUS.  This is a low risk surgery and the patient has no increased risk for major cardiac complications.  Please note his history of obstructive sleep apnea for which he uses CPAP.  He has no cardiopulmonary contraindication to proposed procedure may proceed without further cardiopulmonary testing    He was recently hospitalized at [facility] for what sounds like vertigo.  They have done a cardiac work-up with CT of the chest abdomen and pelvis with numerous incidental findings, echocardiogram and he currently has on an event monitor.  His vertigo has improved.  It sounds like it was possibly a BPPV episode.  Repeat CT of the chest to follow-up pulmonary nodules and aortic enlargement in 6 months.    1. Preoperative examination    2. Dyspepsia    3. Pancreatic calcification    4. Gastroesophageal reflux disease without esophagitis    5. TERE on CPAP    6. Hypothyroidism, unspecified type    7. Benign prostatic hyperplasia with urinary frequency    8. 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 Primary insomnia    10. Pulmonary nodules    11. Aneurysm of ascending aorta without rupture (H)    12. Vertigo    13. Morbid obesity (H)         Patient Profile   Social History     Social History Narrative    Grew up [place] [address] [place] [address]/[facility]. 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 3 Kids (1 dtr/2 sons); wife  . 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 Six grand kids. Non smoker/non drinker.          Past Medical History   Patient Active Problem List   Diagnosis    Optic disc drusen    Posterior vitreous detachment, both eyes    Gastroesophageal reflux disease without esophagitis    Primary insomnia    Morbid obesity (H)    Recurrent major depression in full remission (H)    Uric acid kidney stone    Hard of Hearing - Hearing Aids    TERE on CPAP    Benign prostatic hyperplasia with urinary frequency    Hypothyroidism, unspecified type    Ascending aortic aneurysm (H)    Pulmonary nodules       Past Surgical History  He has a past surgical history that includes colonoscopy; Herniorrhaphy femoral bilateral; examegd (6/22/2015); Circumcision; Inguinal Hernia Repair (Bilateral); Laparoscopic cholecystectomy (N/A, 10/8/2015); and TOTAL KNEE ARTHROPLASTY (Right, 8/31/2016).     History of Present Illness   This 83 year old man comes in for posthospital follow-up and preoperative evaluation.  He was admitted with what sounds like vertigo.  He had chest imaging echocardiogram telemetry labs which all generally looked okay with a few incidental findings including some pancreatic calcification on his CT.  He also had some weight loss and dyspepsia symptoms and was sent over to [facility] where he saw Dr. Modesto Ochoa and upper endoscopy and EUS recommended.  His vertigo symptoms have resolved.  He does have an event monitor on without known results at this time.  His telemetry did show first-degree AV block and occasional PVC.    Recent antiplatelet use: no  Personal or family history of bleeding or clotting disorders: no  Steroid use in the past year: no  Personal or family history of difficulty with anesthesia: no  Current cardiopulmonary symptoms: no except for as above  Again he does have obstructive sleep apnea on CPAP    Review of Systems: A " comprehensive review of systems was negative except as noted.     Medications and Allergies   Current Outpatient Medications   Medication Sig Dispense Refill    carboxymethylcellulose (REFRESH LIQUIGEL) 1 % ophthalmic solution Place 1 drop into both eyes 3 times daily.      desonide (DESOWEN) 0.05 % external cream       fluticasone (FLONASE) 50 MCG/ACT nasal spray Spray 1 spray into both nostrils 2 times daily 24 g 3    hydrocortisone 2.5 % cream PLEASE SEE ATTACHED FOR DETAILED DIRECTIONS      levothyroxine (SYNTHROID/LEVOTHROID) 100 MCG tablet Take 1 tablet (100 mcg) by mouth daily 90 tablet 4    Melatonin 10 MG TABS tablet Take 1 tablet (10 mg) by mouth nightly as needed for sleep      PARoxetine (PAXIL) 20 MG tablet Take 1 tablet (20 mg) by mouth every morning 90 tablet 3    Polyethylene Glycol 3350 (MIRALAX PO) Take 17 g by mouth daily      rosuvastatin (CRESTOR) 20 MG tablet Take 1 tablet (20 mg) by mouth daily 90 tablet 4    saw palmetto (SERENOA REPENS) 80 MG capsule Take 1 capsule (80 mg) by mouth daily      triamcinolone (KENALOG) 0.1 % external cream APPLY THREE TIME TO ECZEMA AFFECTED AREAS UNTIL CLEAR.      vitamin D3 (CHOLECALCIFEROL) 50 mcg (2000 units) tablet Take 2 tablets (100 mcg) by mouth daily      amoxicillin (AMOXIL) 500 MG tablet TAKE 4 TABLETS BY MOUTH 1 HOUR BEFORE DENTAL APPT (Patient not taking: Reported on 8/3/2023)       Allergies   Allergen Reactions    Seasonal Allergies         Family and Social History   Family History   Problem Relation Age of Onset    Cancer - colorectal Mother     Glaucoma Mother     Colon Cancer Mother     Alzheimer Disease Sister     Alzheimer Disease Sister     Dementia Sister     Dementia Sister     Cancer - colorectal Brother          in their 40's.    Cancer - colorectal Brother          in their 40's.    Colon Cancer Brother     Colon Cancer Brother     No Known Problems Daughter     No Known Problems Son     No Known Problems Son     Anesthesia  "Reaction No family hx of     Crohn's Disease No family hx of     Ulcerative Colitis No family hx of         Social History     Tobacco Use    Smoking status: Never    Smokeless tobacco: Never   Substance Use Topics    Alcohol use: No    Drug use: No        Physical Exam   General Appearance:   No acute distress    /75 (BP Location: Left arm, Patient Position: Sitting, Cuff Size: Adult Large)   Pulse 62   Temp 97.7  F (36.5  C) (Tympanic)   Resp 12   Ht 1.689 m (5' 6.5\")   Wt 107.2 kg (236 lb 4.8 oz)   SpO2 94%   BMI 37.57 kg/m      EYES: Eyelids, conjunctiva, and sclera were normal. Pupils were normal. Cornea, iris, and lens were normal bilaterally.  HEAD, EARS, NOSE, MOUTH, AND THROAT: Head and face were normal. Hearing was normal to voice and the ears were normal to external exam. Nose appearance was normal and there was no discharge.   NECK: Neck appearance was normal. There were no neck masses and the thyroid was not enlarged.  RESPIRATORY: Breathing pattern was normal and the chest moved symmetrically.  Percussion/auscultatory percussion was normal.  Lung sounds were normal and there were no abnormal sounds.  CARDIOVASCULAR: Heart rate and rhythm were normal.  S1 and S2 were normal and there were no extra sounds or murmurs. Peripheral pulses in arms and legs were normal.  Jugular venous pressure was normal.  There was no peripheral edema.  GASTROINTESTINAL: The abdomen was normal in contour.    NEUROLOGIC: The patient was alert and oriented to person, place, time, and circumstance. Speech was normal. Cranial nerves were normal. Motor strength was normal for age. The patient was normally coordinated.  PSYCHIATRIC:  Mood and affect were normal and the patient had normal recent and remote memory. The patient's judgment and insight were normal.       Additional Tests   Laboratory: His basic metabolic panel and CBC on 9/15/2023 looked okay    Radiology: I reviewed CT of the chest abdomen and pelvis " echocardiogram and EKG    Electrocardiogram: Per report shows sinus bradycardia with first-degree AV block and possible lateral infarct, I was unable to personally review this image    Total time 40 minutes including chart review, history, examination, counseling and documentation     Miguelito Hall MD  Internal Medicine  Contact me at [phone]

## 2023-10-02 ENCOUNTER — TELEPHONE (OUTPATIENT)
Dept: INTERNAL MEDICINE | Facility: CLINIC | Age: 83
End: 2023-10-02
Payer: MEDICARE

## 2023-10-02 NOTE — TELEPHONE ENCOUNTER
Spoke with patient to gather more information. Patient states he saw Dr Hall on 9/26 and received his flu shot in his left arm. States the following day he noticed his left elbow was swollen to the size of a tennis ball. States the area is also red and warm to the touch. Denies pain or injury to the area. He requests a visit with Dr Hall to examine the elbow. Appointment scheduled per his request. States he will follow up at the visit.

## 2023-10-02 NOTE — TELEPHONE ENCOUNTER
Reason for Call:  Other call back    Detailed comments: pt would like to speak with Dr. Peoples Nurse only. He got a flu shot last week and now has a lump on his elbow. Pt is very concerned and would like a call back as soon as possible.    Phone Number Patient can be reached at: Home number on file [phone] (home)    Best Time: any    Can we leave a detailed message on this number? YES    Call taken on 10/2/2023 at 1:03 PM by Zuly Spear

## 2023-10-03 ENCOUNTER — OFFICE VISIT (OUTPATIENT)
Dept: INTERNAL MEDICINE | Facility: CLINIC | Age: 83
End: 2023-10-03
Payer: MEDICARE

## 2023-10-03 VITALS
WEIGHT: 237.6 LBS | OXYGEN SATURATION: 95 % | RESPIRATION RATE: 16 BRPM | BODY MASS INDEX: 37.29 KG/M2 | HEIGHT: 67 IN | TEMPERATURE: 97.6 F | DIASTOLIC BLOOD PRESSURE: 72 MMHG | HEART RATE: 50 BPM | SYSTOLIC BLOOD PRESSURE: 108 MMHG

## 2023-10-03 DIAGNOSIS — N20.0 URIC ACID KIDNEY STONE: ICD-10-CM

## 2023-10-03 DIAGNOSIS — K86.89 PANCREATIC CALCIFICATION: ICD-10-CM

## 2023-10-03 DIAGNOSIS — M70.22 OLECRANON BURSITIS OF LEFT ELBOW: Primary | ICD-10-CM

## 2023-10-03 PROCEDURE — 91320 SARSCV2 VAC 30MCG TRS-SUC IM: CPT | Performed by: INTERNAL MEDICINE

## 2023-10-03 PROCEDURE — 99214 OFFICE O/P EST MOD 30 MIN: CPT | Performed by: INTERNAL MEDICINE

## 2023-10-03 PROCEDURE — 90480 ADMN SARSCOV2 VAC 1/ONLY CMP: CPT | Performed by: INTERNAL MEDICINE

## 2023-10-03 NOTE — PROGRESS NOTES
"  Office Visit - Follow Up   Gagan Nieto   83 year old male    Date of Visit: 10/3/2023    Chief Complaint   Patient presents with    office visit     Recheck Medication     Pt is here due to swollen elbow         Assessment and Plan   1. Olecranon bursitis of left elbow  Appears to be more of a traumatic type bursitis no evidence of significant inflammation.  I do not see indication for antibiotics.  Without significant inflammation I doubt this is gout.    2. Pancreatic calcification  We reviewed his upcoming EUS    3. Uric acid kidney stone  I do not think his elbow swelling is from gout.    He does want a COVID-vaccine and we will give it intramuscularly in the thigh so as to prevent any swelling of his right elbow or to injecting the same side that is bothering him.    Return in about 4 weeks (around 10/31/2023) for Follow up.     History of Present Illness   This 83 year old man comes in for evaluation of left elbow swelling.  This started after he had a flu shot on the same side.  The swelling has improved.  No significant pain.  No fever or chills.  No drainage.  He has not yet had his EUS.  He has a history of uric acid kidney stones but no recent gout issues.       Physical Exam   General Appearance:   No acute distress    /72 (BP Location: Left arm, Patient Position: Sitting, Cuff Size: Adult Regular)   Pulse 50   Temp 97.6  F (36.4  C) (Oral)   Resp 16   Ht 1.69 m (5' 6.54\")   Wt 107.8 kg (237 lb 9.6 oz)   SpO2 95%   BMI 37.74 kg/m      His left elbow is swollen but not hot red and no painful range of motion.  No other hot or swollen joints.     Additional Information   Current Outpatient Medications   Medication Sig Dispense Refill    amoxicillin (AMOXIL) 500 MG tablet       carboxymethylcellulose (REFRESH LIQUIGEL) 1 % ophthalmic solution Place 1 drop into both eyes 3 times daily.      desonide (DESOWEN) 0.05 % external cream       fluticasone (FLONASE) 50 MCG/ACT nasal spray Spray 1 " spray into both nostrils 2 times daily 24 g 3    hydrocortisone 2.5 % cream PLEASE SEE ATTACHED FOR DETAILED DIRECTIONS      levothyroxine (SYNTHROID/LEVOTHROID) 100 MCG tablet Take 1 tablet (100 mcg) by mouth daily 90 tablet 4    Melatonin 10 MG TABS tablet Take 1 tablet (10 mg) by mouth nightly as needed for sleep      PARoxetine (PAXIL) 20 MG tablet Take 1 tablet (20 mg) by mouth every morning 90 tablet 3    Polyethylene Glycol 3350 (MIRALAX PO) Take 17 g by mouth daily      rosuvastatin (CRESTOR) 20 MG tablet Take 1 tablet (20 mg) by mouth daily 90 tablet 4    saw palmetto (SERENOA REPENS) 80 MG capsule Take 1 capsule (80 mg) by mouth daily      triamcinolone (KENALOG) 0.1 % external cream APPLY THREE TIME TO ECZEMA AFFECTED AREAS UNTIL CLEAR.      vitamin D3 (CHOLECALCIFEROL) 50 mcg (2000 units) tablet Take 2 tablets (100 mcg) by mouth daily         Time:      Maria M Dee submitted by the patient for this visit:  General Questionnaire (Submitted on 10/3/2023)  Chief Complaint: Chronic problems general questions HPI Form  What is the reason for your visit today? : elbow swollen  How many servings of fruits and vegetables do you eat daily?: 4 or more  On average, how many sweetened beverages do you drink each day (Examples: soda, juice, sweet tea, etc.  Do NOT count diet or artificially sweetened beverages)?: 0  How many minutes a day do you exercise enough to make your heart beat faster?: 30 to 60  How many days a week do you exercise enough to make your heart beat faster?: 7  How many days per week do you miss taking your medication?: 0

## 2023-10-17 ENCOUNTER — TRANSFERRED RECORDS (OUTPATIENT)
Dept: HEALTH INFORMATION MANAGEMENT | Facility: CLINIC | Age: 83
End: 2023-10-17
Payer: MEDICARE

## 2023-10-30 ENCOUNTER — OFFICE VISIT (OUTPATIENT)
Dept: INTERNAL MEDICINE | Facility: CLINIC | Age: 83
End: 2023-10-30
Payer: MEDICARE

## 2023-10-30 VITALS
BODY MASS INDEX: 36.84 KG/M2 | DIASTOLIC BLOOD PRESSURE: 64 MMHG | RESPIRATION RATE: 16 BRPM | WEIGHT: 234.7 LBS | HEIGHT: 67 IN | OXYGEN SATURATION: 93 % | TEMPERATURE: 97 F | HEART RATE: 48 BPM | SYSTOLIC BLOOD PRESSURE: 102 MMHG

## 2023-10-30 DIAGNOSIS — M70.22 OLECRANON BURSITIS OF LEFT ELBOW: ICD-10-CM

## 2023-10-30 DIAGNOSIS — E03.9 HYPOTHYROIDISM, UNSPECIFIED TYPE: ICD-10-CM

## 2023-10-30 DIAGNOSIS — K21.9 GASTROESOPHAGEAL REFLUX DISEASE WITHOUT ESOPHAGITIS: Primary | ICD-10-CM

## 2023-10-30 DIAGNOSIS — K86.89 PANCREATIC CALCIFICATION: ICD-10-CM

## 2023-10-30 PROCEDURE — 99214 OFFICE O/P EST MOD 30 MIN: CPT | Performed by: INTERNAL MEDICINE

## 2023-10-30 ASSESSMENT — PAIN SCALES - GENERAL: PAINLEVEL: NO PAIN (0)

## 2023-10-30 NOTE — PROGRESS NOTES
"  Office Visit - Follow Up   Gagan Nieto   83 year old male    Date of Visit: 10/30/2023    Chief Complaint   Patient presents with    Recheck Medication    Follow Up     Follow up for mass on left arm        Assessment and Plan   1. Gastroesophageal reflux disease without esophagitis  - omeprazole (PRILOSEC) 20 MG DR capsule; Take 1 capsule (20 mg) by mouth daily    2. Pancreatic calcification  Reviewed procedure and pathology, follow-up with GI    3. Olecranon bursitis of left elbow  Discussed compression, avoidance of leaning on the elbow, time for resolution and to contact me if there is any increase in warmth, redness or pain    4. Hypothyroidism, unspecified type  Continue levothyroxine most recent TSH looked okay we can recheck at his next visit    Return in about 3 months (around 1/30/2024) for Follow up.     History of Present Illness   This 83 year old is in for follow-up.  Overall doing well.  We reviewed his endoscopic procedure and pathology results.  Now on omeprazole which seems to be helping.  His left elbow is a bit swollen still.  Wants to review medication       Physical Exam   General Appearance:   No acute distress    /64 (BP Location: Left arm, Patient Position: Sitting, Cuff Size: Adult Large)   Pulse (!) 48   Temp 97  F (36.1  C)   Resp 16   Ht 1.689 m (5' 6.5\")   Wt 106.5 kg (234 lb 11.2 oz)   SpO2 93%   BMI 37.31 kg/m      Swelling of the left olecranon bursa without evidence of inflammation     Additional Information   Current Outpatient Medications   Medication Sig Dispense Refill    amoxicillin (AMOXIL) 500 MG tablet       carboxymethylcellulose (REFRESH LIQUIGEL) 1 % ophthalmic solution Place 1 drop into both eyes 3 times daily.      desonide (DESOWEN) 0.05 % external cream       fluticasone (FLONASE) 50 MCG/ACT nasal spray Spray 1 spray into both nostrils 2 times daily 24 g 3    hydrocortisone 2.5 % cream PLEASE SEE ATTACHED FOR DETAILED DIRECTIONS      levothyroxine " (SYNTHROID/LEVOTHROID) 100 MCG tablet Take 1 tablet (100 mcg) by mouth daily 90 tablet 4    Melatonin 10 MG TABS tablet Take 1 tablet (10 mg) by mouth nightly as needed for sleep      omeprazole (PRILOSEC) 20 MG DR capsule Take 1 capsule (20 mg) by mouth daily      PARoxetine (PAXIL) 20 MG tablet Take 1 tablet (20 mg) by mouth every morning 90 tablet 3    Polyethylene Glycol 3350 (MIRALAX PO) Take 17 g by mouth daily      rosuvastatin (CRESTOR) 20 MG tablet Take 1 tablet (20 mg) by mouth daily 90 tablet 4    saw palmetto (SERENOA REPENS) 80 MG capsule Take 1 capsule (80 mg) by mouth daily      triamcinolone (KENALOG) 0.1 % external cream APPLY THREE TIME TO ECZEMA AFFECTED AREAS UNTIL CLEAR.      vitamin D3 (CHOLECALCIFEROL) 50 mcg (2000 units) tablet Take 2 tablets (100 mcg) by mouth daily         Time:      Miguelito Hall MD  Answers submitted by the patient for this visit:  General Questionnaire (Submitted on 10/30/2023)  Chief Complaint: Chronic problems general questions HPI Form  What is the reason for your visit today? : lump on left arm  How many servings of fruits and vegetables do you eat daily?: 4 or more  On average, how many sweetened beverages do you drink each day (Examples: soda, juice, sweet tea, etc.  Do NOT count diet or artificially sweetened beverages)?: 0  How many minutes a day do you exercise enough to make your heart beat faster?: 30 to 60  How many days a week do you exercise enough to make your heart beat faster?: 7

## 2023-11-29 NOTE — PROGRESS NOTES
Gagan FUENTES Gerson  1940      Sleep apnea assessment:-patient has a clinical need for CPAP therapy he uses his machine every night and he is benefiting from it and treating  his sleep apnea; his current pressure setting is 8 cm of water  Fax to [facility] [phone] [facility] clinical customer service  NPI #[id number]    Assessment and Plan:  1 borderline elevated TSH we will repeat today  2 chronic insomnia continue with Ambien as needed minimize as much as possible  3 sleep apnea see above  4 chronic depressive type symptoms stable on current medication  5 routine blood test today      Chief Complaint: Follow-up multiple including sleep apnea chronic insomnia elevated TSH    Visit diagnoses:    1. Sleep apnea     2. Insomnia  zolpidem (AMBIEN) 10 mg tablet   3. Elevated TSH  Thyroid Stimulating Hormone (TSH)   4. Vitamin D deficiency  Vitamin D, Total (25-Hydroxy)   5. Hyperlipemia  HM2(CBC w/o Differential)    Comprehensive Metabolic Panel   6. Depression  PARoxetine (PAXIL) 20 MG tablet       Meds:  Current Outpatient Prescriptions   Medication Sig Dispense Refill     carboxymethylcellulose (REFRESH LIQUIGEL) 1 % ophthalmic solution Administer 1 drop to both eyes 2 (two) times a day.       cholecalciferol, vitamin D3, (CHOLECALCIFEROL) 1,000 unit tablet Take 5 tablets by mouth daily.       cholecalciferol, vitamin D3, 1,000 unit tablet Take 2 tablets by mouth daily.        clobetasol (TEMOVATE) 0.05 % external solution   3     fluticasone (FLONASE) 50 mcg/actuation nasal spray 1 spray into each nostril as needed. 48 g 3     glycerin, adult, Supp rectal suppository Apply 1 suppository around the anus daily as needed (constipation). Indications: Bowel Evacuation       ketoconazole (NIZORAL) 2 % shampoo   3     PARoxetine (PAXIL) 20 MG tablet Take 1 tablet (20 mg total) by mouth daily. 90 tablet 3     polyethylene glycol (GLYCOLAX) 17 gram/dose powder Take 17 g by mouth as needed.       psyllium (FIBER,  PSYLLIUM HUSK,) 0.52 gram capsule Take 0.52 g by mouth 2 (two) times a day.        sodium chloride (OCEAN) 0.65 % nasal spray 1 spray into each nostril as needed for congestion.       tamsulosin (FLOMAX) 0.4 mg Cp24 Take 0.4 mg by mouth as needed.       zolpidem (AMBIEN) 10 mg tablet TAKE 1 TABLET AT BEDTIME AS NEEDED FOR SLEEP 30 tablet 1     zolpidem (AMBIEN) 10 mg tablet Take 1 tablet (10 mg total) by mouth at bedtime as needed for sleep. 30 tablet 1     No current facility-administered medications for this visit.        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  From a physical standpoint he needs refills on his medications he also has a CPAP machine he has some chronic aches and pains but nothing new.  He is due for recheck of his thyroid blood test which we are keep an eye on his TSH was mildly elevated       O:   Vitals:    11/01/18 1134   BP: 112/74   Patient Site: Left Arm   Patient Position: Sitting   Cuff Size: Adult Regular   Pulse: 60   SpO2: 98%   Weight: (!) 248 lb 4 oz (112.6 kg)       Physical Exam:  General-very  pleasant Greek American man wearing a Russel collar; [employer] -  VS- see above  HEENT- neg   Neck- no adenopathy/thyromegaly/bruits  Chest- clear   Cor- reg no murmurs/gallops/ectopics  Extremities: no edema, good distal pulses  Neuro- Cr. NN-  intact, alert,   -    Recent Results (from the past 240 hour(s))   Thyroid Stimulating Hormone (TSH)   Result Value Ref Range    TSH 4.06 0.30 - 5.00 uIU/mL   Vitamin D, Total (25-Hydroxy)   Result Value Ref Range    Vitamin D, Total (25-Hydroxy) 28.9 (L) 30.0 - 80.0 ng/mL   HM2(CBC w/o Differential)   Result Value Ref Range    WBC  Patient is a 70y old  Male who presents with a chief complaint of The patient is a 70y Male complaining of Lt foot wound. (28 Nov 2023 15:39)      SUBJECTIVE / OVERNIGHT EVENTS:    Events noted.  CONSTITUTIONAL: No fever,  or fatigue  RESPIRATORY: No cough, wheezing,  No shortness of breath  CARDIOVASCULAR: No chest pain, palpitations, dizziness, or leg swelling  GASTROINTESTINAL: No abdominal or epigastric pain.       MEDICATIONS  (STANDING):  amLODIPine   Tablet 2.5 milliGRAM(s) Oral daily  ampicillin/sulbactam  IVPB 3 Gram(s) IV Intermittent every 6 hours  ascorbic acid 500 milliGRAM(s) Oral daily  aspirin enteric coated 81 milliGRAM(s) Oral daily  atorvastatin 40 milliGRAM(s) Oral at bedtime  dextrose 5%. 1000 milliLiter(s) (50 mL/Hr) IV Continuous <Continuous>  dextrose 5%. 1000 milliLiter(s) (100 mL/Hr) IV Continuous <Continuous>  dextrose 50% Injectable 25 Gram(s) IV Push once  dextrose 50% Injectable 25 Gram(s) IV Push once  dextrose 50% Injectable 12.5 Gram(s) IV Push once  gabapentin 100 milliGRAM(s) Oral every 8 hours  glucagon  Injectable 1 milliGRAM(s) IntraMuscular once  insulin lispro (ADMELOG) corrective regimen sliding scale   SubCutaneous three times a day before meals  losartan 25 milliGRAM(s) Oral daily  multivitamin/minerals 1 Tablet(s) Oral daily  polyethylene glycol 3350 17 Gram(s) Oral daily  senna 2 Tablet(s) Oral at bedtime    MEDICATIONS  (PRN):  acetaminophen     Tablet .. 650 milliGRAM(s) Oral every 6 hours PRN Temp greater or equal to 38C (100.4F), Mild Pain (1 - 3)  dextrose Oral Gel 15 Gram(s) Oral once PRN Blood Glucose LESS THAN 70 milliGRAM(s)/deciliter  oxyCODONE    IR 10 milliGRAM(s) Oral every 4 hours PRN Severe Pain (7 - 10)  oxyCODONE    IR 5 milliGRAM(s) Oral every 4 hours PRN Moderate Pain (4 - 6)        CAPILLARY BLOOD GLUCOSE      POCT Blood Glucose.: 185 mg/dL (28 Nov 2023 22:12)  POCT Blood Glucose.: 167 mg/dL (28 Nov 2023 17:39)  POCT Blood Glucose.: 242 mg/dL (28 Nov 2023 12:24)  POCT Blood Glucose.: 165 mg/dL (28 Nov 2023 08:32)    I&O's Summary    28 Nov 2023 07:01  -  28 Nov 2023 22:51  --------------------------------------------------------  IN: 480 mL / OUT: 800 mL / NET: -320 mL        T(C): 36.3 (11-28-23 @ 21:25), Max: 36.5 (11-28-23 @ 05:33)  HR: 83 (11-28-23 @ 21:25) (79 - 86)  BP: 175/82 (11-28-23 @ 21:25) (152/71 - 175/82)  RR: 18 (11-28-23 @ 21:25) (18 - 18)  SpO2: 99% (11-28-23 @ 21:25) (97% - 99%)    PHYSICAL EXAM:  GENERAL: NAD  NECK: Supple, No JVD  CHEST/LUNG: Clear to auscultation bilaterally; No wheezing.  HEART: Regular rate and rhythm; No murmurs, rubs, or gallops  ABDOMEN: Soft, Nontender, Nondistended; Bowel sounds present  EXTREMITIES:   Lt foot wound  NEUROLOGY: AAO X 3      LABS:                        10.2   6.40  )-----------( 225      ( 27 Nov 2023 06:45 )             31.0     11-27    137  |  103  |  23  ----------------------------<  159<H>  4.7   |  24  |  1.06    Ca    9.4      27 Nov 2023 06:45    TPro  7.4  /  Alb  3.5  /  TBili  0.2  /  DBili  x   /  AST  8<L>  /  ALT  15  /  AlkPhos  79  11-27          Urinalysis Basic - ( 27 Nov 2023 06:45 )    Color: x / Appearance: x / SG: x / pH: x  Gluc: 159 mg/dL / Ketone: x  / Bili: x / Urobili: x   Blood: x / Protein: x / Nitrite: x   Leuk Esterase: x / RBC: x / WBC x   Sq Epi: x / Non Sq Epi: x / Bacteria: x      CAPILLARY BLOOD GLUCOSE      POCT Blood Glucose.: 185 mg/dL (28 Nov 2023 22:12)  POCT Blood Glucose.: 167 mg/dL (28 Nov 2023 17:39)  POCT Blood Glucose.: 242 mg/dL (28 Nov 2023 12:24)  POCT Blood Glucose.: 165 mg/dL (28 Nov 2023 08:32)        RADIOLOGY & ADDITIONAL TESTS:    Imaging Personally Reviewed:    Consultant(s) Notes Reviewed:      Care Discussed with Consultants/Other Providers:    Luciano Lo MD, CMD, FACP    [address] [address]  Office Tel: [phone]  Cell: [phone]   4.7 4.0 - 11.0 thou/uL    RBC 5.06 4.40 - 6.20 mill/uL    Hemoglobin 15.3 14.0 - 18.0 g/dL    Hematocrit 45.5 40.0 - 54.0 %    MCV 90 80 - 100 fL    MCH 30.2 27.0 - 34.0 pg    MCHC 33.5 32.0 - 36.0 g/dL    RDW 12.9 11.0 - 14.5 %    Platelets 141 140 - 440 thou/uL    MPV 7.2 7.0 - 10.0 fL   Comprehensive Metabolic Panel   Result Value Ref Range    Sodium 143 136 - 145 mmol/L    Potassium 4.6 3.5 - 5.0 mmol/L    Chloride 107 98 - 107 mmol/L    CO2 24 22 - 31 mmol/L    Anion Gap, Calculation 12 5 - 18 mmol/L    Glucose 85 70 - 125 mg/dL    BUN 21 8 - 28 mg/dL    Creatinine 0.90 0.70 - 1.30 mg/dL    GFR MDRD Af Amer >60 >60 mL/min/1.73m2    GFR MDRD Non Af Amer >60 >60 mL/min/1.73m2    Bilirubin, Total 1.0 0.0 - 1.0 mg/dL    Calcium 10.0 8.5 - 10.5 mg/dL    Protein, Total 6.8 6.0 - 8.0 g/dL    Albumin 4.2 3.5 - 5.0 g/dL    Alkaline Phosphatase 62 45 - 120 U/L    AST 30 0 - 40 U/L    ALT 29 0 - 45 U/L           Jean Pierre Ann MD

## 2023-12-27 ENCOUNTER — TELEPHONE (OUTPATIENT)
Dept: INTERNAL MEDICINE | Facility: CLINIC | Age: 83
End: 2023-12-27
Payer: MEDICARE

## 2023-12-27 DIAGNOSIS — F33.42 RECURRENT MAJOR DEPRESSION IN FULL REMISSION (H): ICD-10-CM

## 2023-12-27 RX ORDER — PAROXETINE 20 MG/1
20 TABLET, FILM COATED ORAL EVERY MORNING
Qty: 90 TABLET | Refills: 3 | Status: CANCELLED | OUTPATIENT
Start: 2023-12-27

## 2023-12-27 NOTE — TELEPHONE ENCOUNTER
He's currently out of meds       Disp Refills Start End HENRIETTA   PARoxetine (PAXIL) 20 MG tablet 90 tablet 3 8/3/2023 -- No   Sig - Route: Take 1 tablet (20 mg) by mouth every morning - Oral   Sent to pharmacy as: PARoxetine HCl 20 MG Oral Tablet (PAXIL)   Class: E-Prescribe   Order: [id number]   E-Prescribing Status: Receipt confirmed by pharmacy (8/3/2023 12:57 PM CDT)     Printout Tracking    External Result Report     Pharmacy    [facility] #[id number] - [place], [place] - [address] [place] [address]

## 2023-12-27 NOTE — TELEPHONE ENCOUNTER
December 27, 2023    Home health orders was received via fax for Dr. Esparza, covering Dr. Hall, to sign.  Patient label was attached to paperwork and placed in provider's inbox to be signed.    Marisa Wei

## 2023-12-27 NOTE — TELEPHONE ENCOUNTER
Chart review indicates patient should have refills on file at the pharmacy.    Attempted to contact patient (1/3) to gather more information. No answer. Left generic message to call pharmacy for refills or to call back the clinic if pharmacy needs a new prescription from the clinic.

## 2023-12-29 RX ORDER — PAROXETINE 20 MG/1
20 TABLET, FILM COATED ORAL EVERY MORNING
Qty: 90 TABLET | Refills: 3 | Status: SHIPPED | OUTPATIENT
Start: 2023-12-29 | End: 2024-03-12

## 2023-12-29 NOTE — TELEPHONE ENCOUNTER
"Last Written Prescription Date:  8/3/2023  Last Fill Quantity: 90,  # refills: 3   Last office visit provider:  10/30/2023     Requested Prescriptions   Pending Prescriptions Disp Refills    PARoxetine (PAXIL) 20 MG tablet 90 tablet 3     Sig: Take 1 tablet (20 mg) by mouth every morning       SSRIs Protocol Passed - 12/29/2023  4:06 PM        Passed - PHQ-9 score less than 5 in past 6 months     Please review last PHQ-9 score.           Passed - Medication is active on med list        Passed - Patient is age 18 or older        Passed - Recent (6 mo) or future (30 days) visit within the authorizing provider's specialty     Patient had office visit in the last 6 months or has a visit in the next 30 days with authorizing provider or within the authorizing provider's specialty.  See \"Patient Info\" tab in inbasket, or \"Choose Columns\" in Meds & Orders section of the refill encounter.                 Alfredo Trinh RN 12/29/23 4:06 PM  "

## 2024-01-03 NOTE — TELEPHONE ENCOUNTER
January 3, 2024    Home health orders was picked up from outbox of Dr. Esparza.  Paperwork has been reviewed and is complete.  Per initial initial request, this was sent via fax to [phone].     Maria A Kitchen

## 2024-01-16 DIAGNOSIS — E03.9 HYPOTHYROIDISM, UNSPECIFIED TYPE: ICD-10-CM

## 2024-01-16 RX ORDER — LEVOTHYROXINE SODIUM 100 UG/1
100 TABLET ORAL DAILY
Qty: 90 TABLET | Refills: 4 | Status: SHIPPED | OUTPATIENT
Start: 2024-01-16

## 2024-01-16 NOTE — TELEPHONE ENCOUNTER
Pt called and he needs new prescription due to [facility] gettting a new computer sysytem and lost some of the scripts.    Please send in as covering provider

## 2024-02-02 ENCOUNTER — TELEPHONE (OUTPATIENT)
Dept: INTERNAL MEDICINE | Facility: CLINIC | Age: 84
End: 2024-02-02
Payer: MEDICARE

## 2024-02-02 NOTE — TELEPHONE ENCOUNTER
February 2, 2024    [facility] CPAP Supplies was received via fax for Dr. Esparza to sign.  Patient label was attached to paperwork and placed in provider's inbox to be signed.    Maria A Kitchen

## 2024-02-07 NOTE — TELEPHONE ENCOUNTER
February 7, 2024    CPAP supply order was picked up from outbox of Dr. Esparza.  Paperwork has been reviewed and is complete.  Per initial initial request, this was sent via fax to [phone].     Marisa Wei

## 2024-03-12 ENCOUNTER — OFFICE VISIT (OUTPATIENT)
Dept: INTERNAL MEDICINE | Facility: CLINIC | Age: 84
End: 2024-03-12
Payer: MEDICARE

## 2024-03-12 VITALS
TEMPERATURE: 97 F | DIASTOLIC BLOOD PRESSURE: 79 MMHG | BODY MASS INDEX: 35.62 KG/M2 | OXYGEN SATURATION: 100 % | HEART RATE: 47 BPM | RESPIRATION RATE: 16 BRPM | WEIGHT: 240.5 LBS | SYSTOLIC BLOOD PRESSURE: 121 MMHG | HEIGHT: 69 IN

## 2024-03-12 DIAGNOSIS — R35.0 BENIGN PROSTATIC HYPERPLASIA WITH URINARY FREQUENCY: ICD-10-CM

## 2024-03-12 DIAGNOSIS — F33.42 RECURRENT MAJOR DEPRESSION IN FULL REMISSION (H): ICD-10-CM

## 2024-03-12 DIAGNOSIS — E03.9 HYPOTHYROIDISM, UNSPECIFIED TYPE: Primary | ICD-10-CM

## 2024-03-12 DIAGNOSIS — N40.1 BENIGN PROSTATIC HYPERPLASIA WITH URINARY FREQUENCY: ICD-10-CM

## 2024-03-12 DIAGNOSIS — M70.22 OLECRANON BURSITIS OF LEFT ELBOW: ICD-10-CM

## 2024-03-12 DIAGNOSIS — K86.89 PANCREATIC CALCIFICATION: ICD-10-CM

## 2024-03-12 DIAGNOSIS — E78.2 MIXED HYPERLIPIDEMIA: ICD-10-CM

## 2024-03-12 DIAGNOSIS — K21.9 GASTROESOPHAGEAL REFLUX DISEASE WITHOUT ESOPHAGITIS: ICD-10-CM

## 2024-03-12 LAB
CHOLEST SERPL-MCNC: 130 MG/DL
FASTING STATUS PATIENT QL REPORTED: NORMAL
HDLC SERPL-MCNC: 64 MG/DL
LDLC SERPL CALC-MCNC: 55 MG/DL
NONHDLC SERPL-MCNC: 66 MG/DL
TRIGL SERPL-MCNC: 56 MG/DL
TSH SERPL DL<=0.005 MIU/L-ACNC: 4.51 UIU/ML (ref 0.3–4.2)

## 2024-03-12 PROCEDURE — 36415 COLL VENOUS BLD VENIPUNCTURE: CPT | Performed by: INTERNAL MEDICINE

## 2024-03-12 PROCEDURE — 80061 LIPID PANEL: CPT | Performed by: INTERNAL MEDICINE

## 2024-03-12 PROCEDURE — 99214 OFFICE O/P EST MOD 30 MIN: CPT | Performed by: INTERNAL MEDICINE

## 2024-03-12 PROCEDURE — G2211 COMPLEX E/M VISIT ADD ON: HCPCS | Performed by: INTERNAL MEDICINE

## 2024-03-12 PROCEDURE — 84443 ASSAY THYROID STIM HORMONE: CPT | Performed by: INTERNAL MEDICINE

## 2024-03-12 RX ORDER — ROSUVASTATIN CALCIUM 20 MG/1
20 TABLET, COATED ORAL DAILY
Qty: 90 TABLET | Refills: 4 | Status: SHIPPED | OUTPATIENT
Start: 2024-03-12

## 2024-03-12 RX ORDER — PAROXETINE 20 MG/1
20 TABLET, FILM COATED ORAL EVERY MORNING
Qty: 90 TABLET | Refills: 4 | Status: SHIPPED | OUTPATIENT
Start: 2024-03-12

## 2024-03-12 ASSESSMENT — PATIENT HEALTH QUESTIONNAIRE - PHQ9
10. IF YOU CHECKED OFF ANY PROBLEMS, HOW DIFFICULT HAVE THESE PROBLEMS MADE IT FOR YOU TO DO YOUR WORK, TAKE CARE OF THINGS AT HOME, OR GET ALONG WITH OTHER PEOPLE: NOT DIFFICULT AT ALL
SUM OF ALL RESPONSES TO PHQ QUESTIONS 1-9: 0
SUM OF ALL RESPONSES TO PHQ QUESTIONS 1-9: 0

## 2024-03-12 NOTE — PROGRESS NOTES
"  Assessment & Plan     Hypothyroidism, unspecified type  Appears well controlled on current levothyroxine dose. No changes today.  - TSH; Future    Olecranon bursitis of left elbow  Resolved.    Pancreatic calcification  Management/observation per GI    Gastroesophageal reflux disease without esophagitis  Well controlled on current omeprazole dosage without symptoms. Continue current dose.  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  - PARoxetine (PAXIL) 20 MG tablet; Take 1 tablet (20 mg) by mouth every morning    Benign prostatic hyperplasia with urinary frequency  No changes.    Mixed hyperlipidemia  No changes.  - Lipid panel reflex to direct LDL Fasting; Future  - rosuvastatin (CRESTOR) 20 MG tablet; Take 1 tablet (20 mg) by mouth daily          BMI  Estimated body mass index is 35.52 kg/m  as calculated from the following:    Height as of this encounter: 1.753 m (5' 9\").    Weight as of this encounter: 109.1 kg (240 lb 8 oz).         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 He reports his GERD remains well controlled on omeprazole, denies reflux or substernal burning. His pancreatic calcifications appear non concerning via GI. Olecranon bursitis has resolved with use of pressure wrap and time. Per his thyroid, he denies sweating, skin dryness, weight change, constipation, diarrhea, heat/cold intolerance, fatigue. Had question about when it is okay to take rosuvastatin, question answered.     History of Present Illness       Reason for visit:  Follow up    He eats 4 or more " "servings of fruits and vegetables daily.He consumes 0 sweetened beverage(s) daily.He exercises with enough effort to increase his heart rate 30 to 60 minutes per day.  He exercises with enough effort to increase his heart rate 5 days per week.   He is taking medications regularly.       RoS: see HPI      Objective    /79 (BP Location: Left arm, Patient Position: Sitting, Cuff Size: Adult Regular)   Pulse (!) 47   Temp 97  F (36.1  C) (Tympanic)   Resp 16   Ht 1.753 m (5' 9\")   Wt 109.1 kg (240 lb 8 oz)   SpO2 100%   BMI 35.52 kg/m    Body mass index is 35.52 kg/m .  Physical Exam   Full painless RoM in L elbow, no apparent swelling.     No results found for this or any previous visit (from the past 24 hour(s)).        Signed Electronically by: Miguelito Hall MD    The longitudinal plan of care for the diagnosis(es)/condition(s) as documented were addressed during this visit. Due to the added complexity in care, I will continue to support Father Bruce in the subsequent management and with ongoing continuity of care.      "

## 2024-04-09 ENCOUNTER — TRANSFERRED RECORDS (OUTPATIENT)
Dept: HEALTH INFORMATION MANAGEMENT | Facility: CLINIC | Age: 84
End: 2024-04-09
Payer: MEDICARE

## 2024-06-12 ENCOUNTER — OFFICE VISIT (OUTPATIENT)
Dept: INTERNAL MEDICINE | Facility: CLINIC | Age: 84
End: 2024-06-12
Payer: MEDICARE

## 2024-06-12 VITALS
SYSTOLIC BLOOD PRESSURE: 117 MMHG | HEIGHT: 69 IN | WEIGHT: 232.8 LBS | OXYGEN SATURATION: 98 % | BODY MASS INDEX: 34.48 KG/M2 | TEMPERATURE: 97.3 F | DIASTOLIC BLOOD PRESSURE: 72 MMHG | HEART RATE: 56 BPM | RESPIRATION RATE: 15 BRPM

## 2024-06-12 DIAGNOSIS — E78.2 MIXED HYPERLIPIDEMIA: ICD-10-CM

## 2024-06-12 DIAGNOSIS — F33.42 RECURRENT MAJOR DEPRESSION IN FULL REMISSION (H): ICD-10-CM

## 2024-06-12 DIAGNOSIS — E03.9 HYPOTHYROIDISM, UNSPECIFIED TYPE: Primary | ICD-10-CM

## 2024-06-12 DIAGNOSIS — K21.9 GASTROESOPHAGEAL REFLUX DISEASE WITHOUT ESOPHAGITIS: ICD-10-CM

## 2024-06-12 PROCEDURE — 99214 OFFICE O/P EST MOD 30 MIN: CPT | Performed by: INTERNAL MEDICINE

## 2024-06-12 PROCEDURE — G2211 COMPLEX E/M VISIT ADD ON: HCPCS | Performed by: INTERNAL MEDICINE

## 2024-06-12 PROCEDURE — 84443 ASSAY THYROID STIM HORMONE: CPT | Performed by: INTERNAL MEDICINE

## 2024-06-12 PROCEDURE — 36415 COLL VENOUS BLD VENIPUNCTURE: CPT | Performed by: INTERNAL MEDICINE

## 2024-06-12 RX ORDER — RESPIRATORY SYNCYTIAL VIRUS VACCINE 120MCG/0.5
0.5 KIT INTRAMUSCULAR ONCE
Qty: 1 EACH | Refills: 0 | Status: CANCELLED | OUTPATIENT
Start: 2024-06-12 | End: 2024-06-12

## 2024-06-12 ASSESSMENT — PAIN SCALES - GENERAL: PAINLEVEL: NO PAIN (0)

## 2024-06-12 NOTE — LETTER
June 13, 2024      Father Bruce Nieto  [address]  [place] [address]        Dear ,    We are writing to inform you of your test results.    Your TSH is in the normal range, which is excellent.  No change in management     Resulted Orders   TSH   Result Value Ref Range    TSH 1.79 0.30 - 4.20 uIU/mL       If you have any questions or concerns, please call the clinic at the number listed above.       Sincerely,      Miguelito Hall MD

## 2024-06-12 NOTE — PROGRESS NOTES
"  Office Visit - Follow Up   Gagan Nieto   84 year old male    Date of Visit: 6/12/2024    Chief Complaint   Patient presents with    RECHECK     3 month follow up          Assessment and Plan   1. Hypothyroidism, unspecified type  Continue levothyroxine, continue to take on empty stomach, adjust dose as needed  - TSH; Future  - TSH    2. Recurrent major depression in full remission (H24)  Continue paroxetine, stable    3. Gastroesophageal reflux disease without esophagitis  Continue omeprazole, stable    4. Mixed hyperlipidemia  Tolerating rosuvastatin, continue lipids okay    The following high BMI interventions were performed this visit: encouragement to exercise and lifestyle education regarding diet    Return in about 3 months (around 9/12/2024) for Routine preventive.     History of Present Illness   This 84 year old man comes in for follow-up.  Overall he is doing well.  Taking thyroid medication now on an empty stomach.  Mood stable.  Reflux controlled.  Tolerating statin       Physical Exam   General Appearance:   No acute distress    /72 (BP Location: Left arm, Patient Position: Sitting, Cuff Size: Adult Regular)   Pulse 56   Temp 97.3  F (36.3  C) (Tympanic)   Resp 15   Ht 1.753 m (5' 9\")   Wt 105.6 kg (232 lb 12.8 oz)   SpO2 98%   BMI 34.38 kg/m           Additional Information   Current Outpatient Medications   Medication Sig Dispense Refill    amoxicillin (AMOXIL) 500 MG tablet       carboxymethylcellulose (REFRESH LIQUIGEL) 1 % ophthalmic solution Place 1 drop into both eyes 3 times daily.      fluticasone (FLONASE) 50 MCG/ACT nasal spray Spray 1 spray into both nostrils 2 times daily 24 g 3    hydrocortisone 2.5 % cream PLEASE SEE ATTACHED FOR DETAILED DIRECTIONS      levothyroxine (SYNTHROID/LEVOTHROID) 100 MCG tablet Take 1 tablet (100 mcg) by mouth daily 90 tablet 4    Melatonin 10 MG TABS tablet Take 1 tablet (10 mg) by mouth nightly as needed for sleep      omeprazole " (PRILOSEC) 20 MG DR capsule Take 1 capsule (20 mg) by mouth daily 90 capsule 4    PARoxetine (PAXIL) 20 MG tablet Take 1 tablet (20 mg) by mouth every morning 90 tablet 4    Polyethylene Glycol 3350 (MIRALAX PO) Take 17 g by mouth daily      rosuvastatin (CRESTOR) 20 MG tablet Take 1 tablet (20 mg) by mouth daily 90 tablet 4    vitamin D3 (CHOLECALCIFEROL) 50 mcg (2000 units) tablet Take 2 tablets (100 mcg) by mouth daily      triamcinolone (KENALOG) 0.1 % external cream APPLY THREE TIME TO ECZEMA AFFECTED AREAS UNTIL CLEAR. (Patient not taking: Reported on 6/12/2024)         Time:     The longitudinal plan of care for the diagnosis(es)/condition(s) as documented were addressed during this visit. Due to the added complexity in care, I will continue to support Father Bruce in the subsequent management and with ongoing continuity of care.     Miguelito Hall MD  Answers submitted by the patient for this visit:  General Questionnaire (Submitted on 6/12/2024)  Chief Complaint: Chronic problems general questions HPI Form  What is the reason for your visit today? : 3 month follow up  How many servings of fruits and vegetables do you eat daily?: 4 or more  On average, how many sweetened beverages do you drink each day (Examples: soda, juice, sweet tea, etc.  Do NOT count diet or artificially sweetened beverages)?: 0  How many minutes a day do you exercise enough to make your heart beat faster?: 30 to 60  How many days a week do you exercise enough to make your heart beat faster?: 7  How many days per week do you miss taking your medication?: 0

## 2024-06-13 ENCOUNTER — TELEPHONE (OUTPATIENT)
Dept: INTERNAL MEDICINE | Facility: CLINIC | Age: 84
End: 2024-06-13
Payer: MEDICARE

## 2024-06-13 LAB — TSH SERPL DL<=0.005 MIU/L-ACNC: 1.79 UIU/ML (ref 0.3–4.2)

## 2024-06-13 NOTE — TELEPHONE ENCOUNTER
Test Results        Who ordered the test:  Dr Hall    Type of test: Lab    Date of test:  6/12/2024    Where was the test performed:  [place]    What are your questions/concerns?:  None. Patient called in. I read the patient Dr Hall's note regarding the patient's TSH.

## 2024-07-05 ENCOUNTER — PATIENT OUTREACH (OUTPATIENT)
Dept: CARE COORDINATION | Facility: CLINIC | Age: 84
End: 2024-07-05
Payer: MEDICARE

## 2024-07-19 ENCOUNTER — PATIENT OUTREACH (OUTPATIENT)
Dept: CARE COORDINATION | Facility: CLINIC | Age: 84
End: 2024-07-19
Payer: MEDICARE

## 2024-09-03 ENCOUNTER — TRANSFERRED RECORDS (OUTPATIENT)
Dept: HEALTH INFORMATION MANAGEMENT | Facility: CLINIC | Age: 84
End: 2024-09-03
Payer: MEDICARE

## 2024-09-09 ENCOUNTER — OFFICE VISIT (OUTPATIENT)
Dept: INTERNAL MEDICINE | Facility: CLINIC | Age: 84
End: 2024-09-09
Payer: MEDICARE

## 2024-09-09 VITALS
OXYGEN SATURATION: 96 % | BODY MASS INDEX: 33.15 KG/M2 | WEIGHT: 223.8 LBS | SYSTOLIC BLOOD PRESSURE: 104 MMHG | HEIGHT: 69 IN | RESPIRATION RATE: 14 BRPM | DIASTOLIC BLOOD PRESSURE: 71 MMHG | HEART RATE: 44 BPM | TEMPERATURE: 98.6 F

## 2024-09-09 DIAGNOSIS — R35.0 BENIGN PROSTATIC HYPERPLASIA WITH URINARY FREQUENCY: ICD-10-CM

## 2024-09-09 DIAGNOSIS — E78.2 MIXED HYPERLIPIDEMIA: ICD-10-CM

## 2024-09-09 DIAGNOSIS — H43.813 POSTERIOR VITREOUS DETACHMENT, BOTH EYES: ICD-10-CM

## 2024-09-09 DIAGNOSIS — N40.1 BENIGN PROSTATIC HYPERPLASIA WITH URINARY FREQUENCY: ICD-10-CM

## 2024-09-09 DIAGNOSIS — E66.811 CLASS 1 OBESITY DUE TO EXCESS CALORIES WITHOUT SERIOUS COMORBIDITY WITH BODY MASS INDEX (BMI) OF 33.0 TO 33.9 IN ADULT: ICD-10-CM

## 2024-09-09 DIAGNOSIS — I49.49 JUNCTIONAL ESCAPE RHYTHM: ICD-10-CM

## 2024-09-09 DIAGNOSIS — E66.09 CLASS 1 OBESITY DUE TO EXCESS CALORIES WITHOUT SERIOUS COMORBIDITY WITH BODY MASS INDEX (BMI) OF 33.0 TO 33.9 IN ADULT: ICD-10-CM

## 2024-09-09 DIAGNOSIS — I45.5 SINUS PAUSE: ICD-10-CM

## 2024-09-09 DIAGNOSIS — F33.42 RECURRENT MAJOR DEPRESSION IN FULL REMISSION (H): ICD-10-CM

## 2024-09-09 DIAGNOSIS — I71.21 ANEURYSM OF ASCENDING AORTA WITHOUT RUPTURE (H): ICD-10-CM

## 2024-09-09 DIAGNOSIS — K21.9 GASTROESOPHAGEAL REFLUX DISEASE WITHOUT ESOPHAGITIS: ICD-10-CM

## 2024-09-09 DIAGNOSIS — Z00.00 ANNUAL PHYSICAL EXAM: Primary | ICD-10-CM

## 2024-09-09 DIAGNOSIS — F51.01 PRIMARY INSOMNIA: ICD-10-CM

## 2024-09-09 DIAGNOSIS — R00.1 SINUS BRADYCARDIA: ICD-10-CM

## 2024-09-09 DIAGNOSIS — H47.323 DRUSEN OF BOTH OPTIC DISCS: ICD-10-CM

## 2024-09-09 DIAGNOSIS — H90.6 MIXED CONDUCTIVE AND SENSORINEURAL HEARING LOSS OF BOTH EARS: ICD-10-CM

## 2024-09-09 DIAGNOSIS — E03.9 HYPOTHYROIDISM, UNSPECIFIED TYPE: ICD-10-CM

## 2024-09-09 LAB
ERYTHROCYTE [DISTWIDTH] IN BLOOD BY AUTOMATED COUNT: 14 % (ref 10–15)
HCT VFR BLD AUTO: 41.4 % (ref 40–53)
HGB BLD-MCNC: 13.5 G/DL (ref 13.3–17.7)
MCH RBC QN AUTO: 30.1 PG (ref 26.5–33)
MCHC RBC AUTO-ENTMCNC: 32.6 G/DL (ref 31.5–36.5)
MCV RBC AUTO: 92 FL (ref 78–100)
PLATELET # BLD AUTO: 133 10E3/UL (ref 150–450)
RBC # BLD AUTO: 4.49 10E6/UL (ref 4.4–5.9)
WBC # BLD AUTO: 5.3 10E3/UL (ref 4–11)

## 2024-09-09 PROCEDURE — 99214 OFFICE O/P EST MOD 30 MIN: CPT | Mod: 25 | Performed by: INTERNAL MEDICINE

## 2024-09-09 PROCEDURE — 80061 LIPID PANEL: CPT | Performed by: INTERNAL MEDICINE

## 2024-09-09 PROCEDURE — G0439 PPPS, SUBSEQ VISIT: HCPCS | Performed by: INTERNAL MEDICINE

## 2024-09-09 PROCEDURE — 84443 ASSAY THYROID STIM HORMONE: CPT | Performed by: INTERNAL MEDICINE

## 2024-09-09 PROCEDURE — 85027 COMPLETE CBC AUTOMATED: CPT | Performed by: INTERNAL MEDICINE

## 2024-09-09 PROCEDURE — 36415 COLL VENOUS BLD VENIPUNCTURE: CPT | Performed by: INTERNAL MEDICINE

## 2024-09-09 PROCEDURE — 80053 COMPREHEN METABOLIC PANEL: CPT | Performed by: INTERNAL MEDICINE

## 2024-09-09 RX ORDER — OFLOXACIN 3 MG/ML
5 SOLUTION AURICULAR (OTIC) 2 TIMES DAILY
COMMUNITY
Start: 2024-09-03

## 2024-09-09 ASSESSMENT — PATIENT HEALTH QUESTIONNAIRE - PHQ9: SUM OF ALL RESPONSES TO PHQ QUESTIONS 1-9: 1

## 2024-09-09 NOTE — LETTER
September 10, 2024      Father Bruce Nieto  [address]  [place] [address]        Dear ,    We are writing to inform you of your test results.    Your labs look okay/stable, which is excellent    Resulted Orders   TSH   Result Value Ref Range    TSH 1.38 0.30 - 4.20 uIU/mL   Comprehensive metabolic panel   Result Value Ref Range    Sodium 142 135 - 145 mmol/L    Potassium 4.6 3.4 - 5.3 mmol/L    Carbon Dioxide (CO2) 27 22 - 29 mmol/L    Anion Gap 8 7 - 15 mmol/L    Urea Nitrogen 16.7 8.0 - 23.0 mg/dL    Creatinine 0.93 0.67 - 1.17 mg/dL    GFR Estimate 80 >60 mL/min/1.73m2      Comment:      eGFR calculated using 2021 CKD-EPI equation.    Calcium 9.4 8.8 - 10.4 mg/dL      Comment:      Reference intervals for this test were updated on 7/16/2024 to reflect our healthy population more accurately. There may be differences in the flagging of prior results with similar values performed with this method. Those prior results can be interpreted in the context of the updated reference intervals.    Chloride 107 98 - 107 mmol/L    Glucose 89 70 - 99 mg/dL    Alkaline Phosphatase 59 40 - 150 U/L    AST 30 0 - 45 U/L    ALT 27 0 - 70 U/L    Protein Total 6.5 6.4 - 8.3 g/dL    Albumin 4.1 3.5 - 5.2 g/dL    Bilirubin Total 0.5 <=1.2 mg/dL    Patient Fasting > 8hrs? Yes    CBC with platelets   Result Value Ref Range    WBC Count 5.3 4.0 - 11.0 10e3/uL    RBC Count 4.49 4.40 - 5.90 10e6/uL    Hemoglobin 13.5 13.3 - 17.7 g/dL    Hematocrit 41.4 40.0 - 53.0 %    MCV 92 78 - 100 fL    MCH 30.1 26.5 - 33.0 pg    MCHC 32.6 31.5 - 36.5 g/dL    RDW 14.0 10.0 - 15.0 %    Platelet Count 133 (L) 150 - 450 10e3/uL   Lipid panel reflex to direct LDL Fasting   Result Value Ref Range    Cholesterol 114 <200 mg/dL    Triglycerides 46 <150 mg/dL    Direct Measure HDL 57 >=40 mg/dL    LDL Cholesterol Calculated 48 <100 mg/dL    Non HDL Cholesterol 57 <130 mg/dL    Patient Fasting > 8hrs? Yes     Narrative     Cholesterol  Desirable: < 200 mg/dL  Borderline High: 200 - 239 mg/dL  High: >= 240 mg/dL    Triglycerides  Normal: < 150 mg/dL  Borderline High: 150 - 199 mg/dL  High: 200-499 mg/dL  Very High: >= 500 mg/dL    Direct Measure HDL  Female: >= 50 mg/dL   Male: >= 40 mg/dL    LDL Cholesterol  Desirable: < 100 mg/dL  Above Desirable: 100 - 129 mg/dL   Borderline High: 130 - 159 mg/dL   High:  160 - 189 mg/dL   Very High: >= 190 mg/dL    Non HDL Cholesterol  Desirable: < 130 mg/dL  Above Desirable: 130 - 159 mg/dL  Borderline High: 160 - 189 mg/dL  High: 190 - 219 mg/dL  Very High: >= 220 mg/dL       If you have any questions or concerns, please call the clinic at the number listed above.       Sincerely,      Miguelito Hall MD

## 2024-09-09 NOTE — PROGRESS NOTES
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  No    HPI        Have you ever done Advance Care Planning? (For example, a Health Directive, POLST, or a discussion with a medical provider or your loved ones about your wishes): Yes, patient states has an Advance Care Planning document and will bring a copy to the clinic.    Healthy Habits  Ability to successfully perform ADLs: Yes  Hearing impairment: Yes, hearing aids  Home Safety: Safe      8/3/2022    11:47 AM   PHQ-2 ( 1999 Pf[employer])   Q1: Little interest or pleasure in doing things 0   Q2: Feeling down, depressed or hopeless 0   PHQ-2 Score 0   Q1: Little interest or pleasure in doing things Not at all   Q2: Feeling down, depressed or hopeless Not at all   PHQ-2 Score 0         9/9/2024     2:49 PM   PHQ   PHQ-9 Total Score 1   Q9: Thoughts of better off dead/self-harm past 2 weeks Not at all     Fall risk  Fallen 2 or more times in the past year?: No  Cognitive Screening   1) Repeat 3 items (Leader, Season, Table)    2) Clock draw: NORMAL  3) 3 item recall: Recalls 3 objects  Results: 3 items recalled: COGNITIVE IMPAIRMENT LESS LIKELY    Mini-CogTM Copyright [employer]. Licensed by the author for use in [facility]; reprinted with permission (robyn@.[facility]). All rights reserved.      Reviewed and updated as needed this visit by clinical staff   Tobacco  Allergies  Meds              Reviewed and updated as needed this visit by Provider         "          Social History     Tobacco Use    Smoking status: Never    Smokeless tobacco: Never   Substance Use Topics    Alcohol use: No             8/3/2022    11:47 AM   Alcohol Use   Prescreen: >3 drinks/day or >7 drinks/week? No   Do you have a current opioid prescription? No  Do you use any other controlled substances or medications that are not prescribed by a provider? None      Current providers sharing in care for this patient include: Patient Care Team:  Miguelito Hall MD as PCP - General  Kandi Lofton APRN CNP as Nurse Practitioner  Jefferson aMncia MD as MD (Colon and Rectal Surgery)  Miguelito Hall MD as Assigned PCP  Kandi Lofton APRN CNP as Assigned Surgical Provider    The following health maintenance items are reviewed in Epic and correct as of today:  Health Maintenance   Topic Date Due    RSV VACCINE (1 - 1-dose 60+ series) Never done    MEDICARE ANNUAL WELLNESS VISIT  08/03/2024    ANNUAL REVIEW OF HM ORDERS  08/03/2024    INFLUENZA VACCINE (1) 09/01/2024    COVID-19 Vaccine (6 - 2023-24 season) 09/01/2024    PHQ-9  03/09/2025    LIPID  03/12/2025    TSH W/FREE T4 REFLEX  06/12/2025    FALL RISK ASSESSMENT  09/09/2025    ADVANCE CARE PLANNING  08/03/2028    Pneumococcal Vaccine: 65+ Years  Completed    ZOSTER IMMUNIZATION  Completed    DEPRESSION ACTION PLAN  Addressed    HPV IMMUNIZATION  Aged Out    MENINGITIS IMMUNIZATION  Aged Out    RSV MONOCLONAL ANTIBODY  Aged Out    DTAP/TDAP/TD IMMUNIZATION  Discontinued     Review of Systems     OBJECTIVE:   /71 (BP Location: Left arm, Patient Position: Sitting, Cuff Size: Adult Regular)   Pulse (!) 44   Temp 98.6  F (37  C) (Oral)   Resp 14   Ht 1.753 m (5' 9\")   Wt 101.5 kg (223 lb 12.8 oz)   SpO2 96%   BMI 33.05 kg/m     Estimated body mass index is 33.05 kg/m  as calculated from the following:    Height as of this encounter: 1.753 m (5' 9\").    Weight as of this encounter: 101.5 kg (223 lb 12.8 " oz).  Physical Exam  EYES: Eyelids, conjunctiva, and sclera were normal. Pupils were normal. Cornea, iris, and lens were normal bilaterally.  HEAD, EARS, NOSE, MOUTH, AND THROAT: Head and face were normal. Hearing was normal to voice and the ears were normal to external exam. Nose appearance was normal and there was no discharge.  NECK: Neck appearance was normal.   RESPIRATORY: Breathing pattern was normal and the chest moved symmetrically.   Lung sounds were normal and there were no abnormal sounds.  CARDIOVASCULAR: Heart rate is slow and rhythm is regular. S1 and S2 were normal and there were no extra sounds or murmurs.There was no peripheral edema.  GASTROINTESTINAL: The abdomen was normal in contour.   NEUROLOGIC: The patient was alert and oriented to person, place, time, and circumstance. Speech was normal. Cranial nerves were normal. Motor strength was normal for age. The patient was normally coordinated.  PSYCHIATRIC:  Mood and affect were normal and the patient had normal recent and remote memory. The patient's judgment and insight were normal.    ASSESSMENT / PLAN:   1. Annual physical exam  This is an 84-year-old man with issues as discussed below    2. Sinus bradycardia  3. Junctional escape rhythm  Appears asymptomatic but fairly profound bradycardia with heart rates as low as 33 on his Holter monitor 1 year ago, junctional escape rhythm and significant pauses  - Adult Cardiology Eval  Referral; Future  - Adult Holter Monitor 24 hour; Future    4. Sinus pause  - Adult Cardiology Eval  Referral; Future  - Adult Holter Monitor 24 hour; Future    5. Hypothyroidism, unspecified type  Continue levothyroxine  - TSH; Future  - TSH    6. Recurrent major depression in full remission (H24)  Stable continue same    7. Gastroesophageal reflux disease without esophagitis  Stable, continue PPI    8. Hard of Hearing - Hearing Aids    9. Benign prostatic hyperplasia with urinary  "frequency  Stable    10. Aneurysm of ascending aorta without rupture (H24)  Last scan January 24 showed stable aneurysm 4.1 cm    11. Mixed hyperlipidemia  Continue rosuvastatin  - Comprehensive metabolic panel; Future  - CBC with platelets; Future  - Lipid panel reflex to direct LDL Fasting; Future  - Comprehensive metabolic panel  - CBC with platelets  - Lipid panel reflex to direct LDL Fasting    12. Primary insomnia  Stable, paroxetine helpful    13. 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 Class 1 obesity due to excess calories without serious comorbidity with body mass index (BMI) of 33.0 to 33.9 in adult  Counseling  Reviewed preventive health counseling, as reflected in patient instructions  BMI  Estimated body mass index is 33.05 kg/m  as calculated from the following:    Height as of this encounter: 1.753 m (5' 9\").    Weight as of this encounter: 101.5 kg (223 lb 12.8 oz).   Weight management plan: Discussed healthy diet and exercise guidelines      He reports that he has never smoked. He has never used smokeless tobacco.      Appropriate preventive services were discussed with this patient, including applicable screening as appropriate for fall prevention, nutrition, physical activity, Tobacco-use cessation, weight loss and cognition.  Checklist reviewing preventive services available has been given to the patient.    Reviewed patients plan of care and provided an AVS. The Basic Care Plan (routine screening as documented in Health Maintenance) for Gagan meets the Care Plan requirement. This Care Plan has been established and reviewed with the Patient.          Signed Electronically by: Miguelito Hall MD    Identified Health Risks  I have reviewed Opioid Use Disorder and Substance Use Disorder risk factors and made any needed referrals.   "

## 2024-09-09 NOTE — PATIENT INSTRUCTIONS
Patient Education   Preventive Care Advice   This is general advice given by our system to help you stay healthy. However, your care team may have specific advice just for you. Please talk to your care team about your preventive care needs.  Nutrition  Eat 5 or more servings of fruits and vegetables each day.  Try wheat bread, brown rice and whole grain pasta (instead of white bread, rice, and pasta).  Get enough calcium and vitamin D. Check the label on foods and aim for 100% of the RDA (recommended daily allowance).  Lifestyle  Exercise at least 150 minutes each week  (30 minutes a day, 5 days a week).  Do muscle strengthening activities 2 days a week. These help control your weight and prevent disease.  No smoking.  Wear sunscreen to prevent skin cancer.  Have a dental exam and cleaning every 6 months.  Yearly exams  See your health care team every year to talk about:  Any changes in your health.  Any medicines your care team has prescribed.  Preventive care, family planning, and ways to prevent chronic diseases.  Shots (vaccines)   HPV shots (up to age 26), if you've never had them before.  Hepatitis B shots (up to age 59), if you've never had them before.  COVID-19 shot: Get this shot when it's due.  Flu shot: Get a flu shot every year.  Tetanus shot: Get a tetanus shot every 10 years.  Pneumococcal, hepatitis A, and RSV shots: Ask your care team if you need these based on your risk.  Shingles shot (for age 50 and up)  General health tests  Diabetes screening:  Starting at age 35, Get screened for diabetes at least every 3 years.  If you are younger than age 35, ask your care team if you should be screened for diabetes.  Cholesterol test: At age 39, start having a cholesterol test every 5 years, or more often if advised.  Bone density scan (DEXA): At age 50, ask your care team if you should have this scan for osteoporosis (brittle bones).  Hepatitis C: Get tested at least once in your life.  STIs (sexually  transmitted infections)  Before age 24: Ask your care team if you should be screened for STIs.  After age 24: Get screened for STIs if you're at risk. You are at risk for STIs (including HIV) if:  You are sexually active with more than one person.  You don't use condoms every time.  You or a partner was diagnosed with a sexually transmitted infection.  If you are at risk for HIV, ask about PrEP medicine to prevent HIV.  Get tested for HIV at least once in your life, whether you are at risk for HIV or not.  Cancer screening tests  Cervical cancer screening: If you have a cervix, begin getting regular cervical cancer screening tests starting at age 21.  Breast cancer scan (mammogram): If you've ever had breasts, begin having regular mammograms starting at age 40. This is a scan to check for breast cancer.  Colon cancer screening: It is important to start screening for colon cancer at age 45.  Have a colonoscopy test every 10 years (or more often if you're at risk) Or, ask your provider about stool tests like a FIT test every year or Cologuard test every 3 years.  To learn more about your testing options, visit:   .  For help making a decision, visit:   https://bit.ly/xq04108.  Prostate cancer screening test: If you have a prostate, ask your care team if a prostate cancer screening test (PSA) at age 55 is right for you.  Lung cancer screening: If you are a current or former smoker ages 50 to 80, ask your care team if ongoing lung cancer screenings are right for you.  For informational purposes only. Not to replace the advice of your health care provider. Copyright   2023 [employer]. All rights reserved. Clinically reviewed by the [facility] Transitions Program. [employer] [id number] - REV 01/24.

## 2024-09-10 LAB
ALBUMIN SERPL BCG-MCNC: 4.1 G/DL (ref 3.5–5.2)
ALP SERPL-CCNC: 59 U/L (ref 40–150)
ALT SERPL W P-5'-P-CCNC: 27 U/L (ref 0–70)
ANION GAP SERPL CALCULATED.3IONS-SCNC: 8 MMOL/L (ref 7–15)
AST SERPL W P-5'-P-CCNC: 30 U/L (ref 0–45)
BILIRUB SERPL-MCNC: 0.5 MG/DL
BUN SERPL-MCNC: 16.7 MG/DL (ref 8–23)
CALCIUM SERPL-MCNC: 9.4 MG/DL (ref 8.8–10.4)
CHLORIDE SERPL-SCNC: 107 MMOL/L (ref 98–107)
CHOLEST SERPL-MCNC: 114 MG/DL
CREAT SERPL-MCNC: 0.93 MG/DL (ref 0.67–1.17)
EGFRCR SERPLBLD CKD-EPI 2021: 80 ML/MIN/1.73M2
FASTING STATUS PATIENT QL REPORTED: YES
FASTING STATUS PATIENT QL REPORTED: YES
GLUCOSE SERPL-MCNC: 89 MG/DL (ref 70–99)
HCO3 SERPL-SCNC: 27 MMOL/L (ref 22–29)
HDLC SERPL-MCNC: 57 MG/DL
LDLC SERPL CALC-MCNC: 48 MG/DL
NONHDLC SERPL-MCNC: 57 MG/DL
POTASSIUM SERPL-SCNC: 4.6 MMOL/L (ref 3.4–5.3)
PROT SERPL-MCNC: 6.5 G/DL (ref 6.4–8.3)
SODIUM SERPL-SCNC: 142 MMOL/L (ref 135–145)
TRIGL SERPL-MCNC: 46 MG/DL
TSH SERPL DL<=0.005 MIU/L-ACNC: 1.38 UIU/ML (ref 0.3–4.2)

## 2024-09-12 ENCOUNTER — HOSPITAL ENCOUNTER (OUTPATIENT)
Dept: CARDIOLOGY | Facility: HOSPITAL | Age: 84
Discharge: HOME | End: 2024-09-12
Attending: INTERNAL MEDICINE | Admitting: INTERNAL MEDICINE
Payer: MEDICARE

## 2024-09-12 DIAGNOSIS — I45.5 SINUS PAUSE: ICD-10-CM

## 2024-09-12 DIAGNOSIS — I49.49 JUNCTIONAL ESCAPE RHYTHM: ICD-10-CM

## 2024-09-12 PROCEDURE — 93225 XTRNL ECG REC<48 HRS REC: CPT

## 2024-09-19 ENCOUNTER — OFFICE VISIT (OUTPATIENT)
Dept: CARDIOLOGY | Facility: CLINIC | Age: 84
End: 2024-09-19
Attending: INTERNAL MEDICINE
Payer: MEDICARE

## 2024-09-19 ENCOUNTER — TRANSFERRED RECORDS (OUTPATIENT)
Dept: HEALTH INFORMATION MANAGEMENT | Facility: CLINIC | Age: 84
End: 2024-09-19

## 2024-09-19 VITALS
HEART RATE: 54 BPM | OXYGEN SATURATION: 94 % | RESPIRATION RATE: 16 BRPM | DIASTOLIC BLOOD PRESSURE: 64 MMHG | WEIGHT: 221.6 LBS | BODY MASS INDEX: 36.92 KG/M2 | SYSTOLIC BLOOD PRESSURE: 110 MMHG | HEIGHT: 65 IN

## 2024-09-19 DIAGNOSIS — I45.5 SINUS PAUSE: ICD-10-CM

## 2024-09-19 DIAGNOSIS — I49.49 JUNCTIONAL ESCAPE RHYTHM: ICD-10-CM

## 2024-09-19 PROCEDURE — 99204 OFFICE O/P NEW MOD 45 MIN: CPT | Performed by: STUDENT IN AN ORGANIZED HEALTH CARE EDUCATION/TRAINING PROGRAM

## 2024-09-19 PROCEDURE — 93000 ELECTROCARDIOGRAM COMPLETE: CPT | Performed by: INTERNAL MEDICINE

## 2024-09-19 PROCEDURE — G2211 COMPLEX E/M VISIT ADD ON: HCPCS | Performed by: STUDENT IN AN ORGANIZED HEALTH CARE EDUCATION/TRAINING PROGRAM

## 2024-09-19 NOTE — LETTER
9/19/2024    Miguelito Hall MD  [address] [address]    RE: Gagan Nieto       Dear Colleague,     I had the pleasure of seeing Gagan Nieto in the [facility].  Cardiology Clinic    Gagan Nieto is a 84 year old with a history of hypothyroidism, depression, and GERD who presents for evaluation of sinus node dysfunction.    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  Some junctional bradycardia as well.  He walks 30 to 45 minutes a day without any shortness of breath, chest pain, palpitations, syncope, or presyncope.  He is unsure if it is harder to do this than it was a year ago but he states that he feels well.  Did have a presyncopal episode in September 2023.    General: Well appearing, appears stated age.  CV: Normal rate and rhythm. No m/r/g. No JVD.   Pulm: Normal effort. Normal breath sounds.  Lower extremities: No lower extremity edema.    Labs:   Reviewed in epic.  Creatinine 0.93  TSH 1.38  Hemoglobin 13.5  Platelets 133    Testing:  Holter monitor 9/2024: Sinus bradycardia, frequent sinus pauses, longest duration of 3.6 seconds.  TTE 9/2023: Dilated aortic root at 4.4 cm, EF 55 to 60%    Assessment and Plan:    1.  Sinus bradycardia, frequent sinus pauses.  Remarkably active for an 84-year-old and maintains that he is asymptomatic.  We talked at length about the natural history of this and potential indications for pacemaker.  He opted to wait and continue to monitor as he feels extremely well.  I think this approach is reasonable.  Will get a TTE and follow-up in 3 months.  If he notes any symptoms, would recommend pacemaker.    2.  Ascending aortic aneurysm, measuring 4.4 cm.  Will monitor on TTE yearly.    The longitudinal plan of care for the diagnosis(es)/condition(s) as documented were addressed during this visit. Due to the added  complexity in care, I will continue to support Father Bruce in the subsequent management and with ongoing continuity of care.    Return in about 3 months (around 12/19/2024).    Dimitri Benjamin MD  Interventional Cardiology      Thank you for allowing me to participate in the care of your patient.      Sincerely,     Dimitri Benjamin MD     [facility]  cc:   Miguelito Hall MD  [facility] [place] [phone]

## 2024-09-19 NOTE — PROGRESS NOTES
Cardiology Clinic    Gagan Nieto is a 84 year old with a history of hypothyroidism, depression, and GERD who presents for evaluation of sinus node dysfunction.    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  Some junctional bradycardia as well.  He walks 30 to 45 minutes a day without any shortness of breath, chest pain, palpitations, syncope, or presyncope.  He is unsure if it is harder to do this than it was a year ago but he states that he feels well.  Did have a presyncopal episode in September 2023.    General: Well appearing, appears stated age.  CV: Normal rate and rhythm. No m/r/g. No JVD.   Pulm: Normal effort. Normal breath sounds.  Lower extremities: No lower extremity edema.    Labs:   Reviewed in epic.  Creatinine 0.93  TSH 1.38  Hemoglobin 13.5  Platelets 133    Testing:  Holter monitor 9/2024: Sinus bradycardia, frequent sinus pauses, longest duration of 3.6 seconds.  TTE 9/2023: Dilated aortic root at 4.4 cm, EF 55 to 60%    Assessment and Plan:    1.  Sinus bradycardia, frequent sinus pauses.  Remarkably active for an 84-year-old and maintains that he is asymptomatic.  We talked at length about the natural history of this and potential indications for pacemaker.  He opted to wait and continue to monitor as he feels extremely well.  I think this approach is reasonable.  Will get a TTE and follow-up in 3 months.  If he notes any symptoms, would recommend pacemaker.    2.  Ascending aortic aneurysm, measuring 4.4 cm.  Will monitor on TTE yearly.    The longitudinal plan of care for the diagnosis(es)/condition(s) as documented were addressed during this visit. Due to the added complexity in care, I will continue to support Father Bruce in the subsequent management and with ongoing continuity of care.    Return in about 3 months (around 12/19/2024).    Dimitri Benjamin MD  Interventional  Cardiology

## 2024-09-20 LAB
ATRIAL RATE - MUSE: 51 BPM
DIASTOLIC BLOOD PRESSURE - MUSE: NORMAL MMHG
INTERPRETATION ECG - MUSE: NORMAL
P AXIS - MUSE: 28 DEGREES
PR INTERVAL - MUSE: 472 MS
QRS DURATION - MUSE: 100 MS
QT - MUSE: 436 MS
QTC - MUSE: 401 MS
R AXIS - MUSE: -35 DEGREES
SYSTOLIC BLOOD PRESSURE - MUSE: NORMAL MMHG
T AXIS - MUSE: 16 DEGREES
VENTRICULAR RATE- MUSE: 51 BPM

## 2024-09-20 PROCEDURE — 93227 XTRNL ECG REC<48 HR R&I: CPT | Performed by: INTERNAL MEDICINE

## 2024-10-09 ENCOUNTER — OFFICE VISIT (OUTPATIENT)
Dept: INTERNAL MEDICINE | Facility: CLINIC | Age: 84
End: 2024-10-09
Payer: MEDICARE

## 2024-10-09 VITALS
WEIGHT: 220.9 LBS | OXYGEN SATURATION: 99 % | TEMPERATURE: 98.3 F | HEART RATE: 44 BPM | BODY MASS INDEX: 36.8 KG/M2 | SYSTOLIC BLOOD PRESSURE: 112 MMHG | RESPIRATION RATE: 11 BRPM | HEIGHT: 65 IN | DIASTOLIC BLOOD PRESSURE: 65 MMHG

## 2024-10-09 DIAGNOSIS — R00.1 SINUS BRADYCARDIA: Primary | ICD-10-CM

## 2024-10-09 DIAGNOSIS — E03.9 HYPOTHYROIDISM, UNSPECIFIED TYPE: ICD-10-CM

## 2024-10-09 DIAGNOSIS — F33.42 RECURRENT MAJOR DEPRESSION IN FULL REMISSION (H): ICD-10-CM

## 2024-10-09 DIAGNOSIS — I71.21 ANEURYSM OF ASCENDING AORTA WITHOUT RUPTURE (H): ICD-10-CM

## 2024-10-09 PROCEDURE — G0008 ADMIN INFLUENZA VIRUS VAC: HCPCS | Performed by: INTERNAL MEDICINE

## 2024-10-09 PROCEDURE — 91320 SARSCV2 VAC 30MCG TRS-SUC IM: CPT | Performed by: INTERNAL MEDICINE

## 2024-10-09 PROCEDURE — 90480 ADMN SARSCOV2 VAC 1/ONLY CMP: CPT | Performed by: INTERNAL MEDICINE

## 2024-10-09 PROCEDURE — 90662 IIV NO PRSV INCREASED AG IM: CPT | Performed by: INTERNAL MEDICINE

## 2024-10-09 PROCEDURE — 99214 OFFICE O/P EST MOD 30 MIN: CPT | Mod: 25 | Performed by: INTERNAL MEDICINE

## 2024-10-09 ASSESSMENT — PAIN SCALES - GENERAL: PAINLEVEL: NO PAIN (0)

## 2024-10-09 NOTE — NURSING NOTE
Patient tolerated Flu and Covid injection without incident. Injection sites free from redness, swelling, and pain. Patient agreeable to remaining in clinic for 15 minutes following injections.

## 2024-10-09 NOTE — PROGRESS NOTES
"  Office Visit - Follow Up   Gagan Nieto   84 year old male    Date of Visit: 10/9/2024    Chief Complaint   Patient presents with    RECHECK     1 month follow up- didn't know he had an appt with PCP today, just wanting immunizations.     Imm/Inj     Wants Covid and Flu shot.         Assessment and Plan   1. Sinus bradycardia  Asymptomatic, will get echocardiogram, follow-up with cardiology, labs reviewed and his thyroid function is within normal range    2. Hypothyroidism, unspecified type  Continue levothyroxine    3. Aneurysm of ascending aorta without rupture (H)  Coming echocardiogram    4. 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     History of Present Illness   This 84 year old male comes in for follow-up.  Since I last saw him he had an event monitor and cardiology visit.  He has fairly profound bradycardia but is asymptomatic.  He discussed pacemaker with cardiologist and at this point because he is asymptomatic it has not been recommended.  Echocardiogram was recommended.  He also has an aortic aneurysm that can be reevaluated at that time as well.       Physical Exam   General Appearance:   Acute distress    /65 (BP Location: Left arm, Patient Position: Sitting, Cuff Size: Adult Regular)   Pulse (!) 44   Temp 98.3  F (36.8  C) (Tympanic)   Resp 11   Ht 1.651 m (5' 5\")   Wt 100.2 kg (220 lb 14.4 oz)   SpO2 99%   BMI 36.76 kg/m           Additional Information   Current Outpatient Medications   Medication Sig Dispense Refill    amoxicillin (AMOXIL) 500 MG tablet Take 4 tablets by mouth as needed (only prior to dental appointments).      carboxymethylcellulose (REFRESH LIQUIGEL) 1 % ophthalmic solution Place 1 drop into both eyes 3 times daily.      fluticasone (FLONASE) 50 MCG/ACT nasal spray Spray 1 spray into both nostrils 2 times daily 24 g 3    hydrocortisone 2.5 % cream Apply " topically as needed.      levothyroxine (SYNTHROID/LEVOTHROID) 100 MCG tablet Take 1 tablet (100 mcg) by mouth daily 90 tablet 4    Melatonin 10 MG TABS tablet Take 1 tablet (10 mg) by mouth nightly as needed for sleep      ofloxacin (FLOXIN) 0.3 % otic solution Place 5 drops Into the left ear 2 times daily.      omeprazole (PRILOSEC) 20 MG DR capsule Take 1 capsule (20 mg) by mouth daily 90 capsule 4    PARoxetine (PAXIL) 20 MG tablet Take 1 tablet (20 mg) by mouth every morning 90 tablet 4    Polyethylene Glycol 3350 (MIRALAX PO) Take 17 g by mouth as needed.      rosuvastatin (CRESTOR) 20 MG tablet Take 1 tablet (20 mg) by mouth daily 90 tablet 4    triamcinolone (KENALOG) 0.1 % external cream Apply topically as needed.      UNABLE TO FIND Take 4 tablets by mouth daily. MEDICATION NAME: Phar bladder/urination control      vitamin D3 (CHOLECALCIFEROL) 50 mcg (2000 units) tablet Take 2 tablets (100 mcg) by mouth daily         Time:     The longitudinal plan of care for the diagnosis(es)/condition(s) as documented were addressed during this visit. Due to the added complexity in care, I will continue to support Father Bruce in the subsequent management and with ongoing continuity of care.     Miguelito Hall MD  Answers submitted by the patient for this visit:  General Questionnaire (Submitted on 10/9/2024)  Chief Complaint: Chronic problems general questions HPI Form  What is the reason for your visit today? : Immunizations  How many servings of fruits and vegetables do you eat daily?: 4 or more  On average, how many sweetened beverages do you drink each day (Examples: soda, juice, sweet tea, etc.  Do NOT count diet or artificially sweetened beverages)?: 1  How many minutes a day do you exercise enough to make your heart beat faster?: 30 to 60  How many days a week do you exercise enough to make your heart beat faster?: 7  How many days per week do you miss taking your medication?: 0

## 2024-10-10 ENCOUNTER — HOSPITAL ENCOUNTER (OUTPATIENT)
Dept: CARDIOLOGY | Facility: HOSPITAL | Age: 84
Discharge: HOME | End: 2024-10-10
Attending: STUDENT IN AN ORGANIZED HEALTH CARE EDUCATION/TRAINING PROGRAM | Admitting: STUDENT IN AN ORGANIZED HEALTH CARE EDUCATION/TRAINING PROGRAM
Payer: MEDICARE

## 2024-10-10 DIAGNOSIS — I49.49 JUNCTIONAL ESCAPE RHYTHM: ICD-10-CM

## 2024-10-10 DIAGNOSIS — I45.5 SINUS PAUSE: ICD-10-CM

## 2024-10-10 LAB — LVEF ECHO: NORMAL

## 2024-10-10 PROCEDURE — 93306 TTE W/DOPPLER COMPLETE: CPT | Mod: 26 | Performed by: INTERNAL MEDICINE

## 2024-10-10 PROCEDURE — 255N000002 HC RX 255 OP 636: Performed by: STUDENT IN AN ORGANIZED HEALTH CARE EDUCATION/TRAINING PROGRAM

## 2024-10-10 RX ADMIN — PERFLUTREN 2 ML: 6.52 INJECTION, SUSPENSION INTRAVENOUS at 15:25

## 2024-10-14 DIAGNOSIS — I49.49 JUNCTIONAL ESCAPE RHYTHM: ICD-10-CM

## 2024-10-14 DIAGNOSIS — I77.819 DILATATION OF AORTA (H): Primary | ICD-10-CM

## 2024-10-14 DIAGNOSIS — I45.5 SINUS PAUSE: ICD-10-CM

## 2024-10-15 ENCOUNTER — TELEPHONE (OUTPATIENT)
Dept: INTERNAL MEDICINE | Facility: CLINIC | Age: 84
End: 2024-10-15
Payer: MEDICARE

## 2024-10-15 NOTE — TELEPHONE ENCOUNTER
Test Results    Contacts       Contact Date/Time Type Contact Phone/Fax    10/15/2024 03:46 PM CDT Phone (Incoming) Father Bruce Nieto (Self) [phone] ()            Who ordered the test:  Dimitri DE LA GARZA    Type of test: Echo     Date of test:  10/14    Where was the test performed:  Cardiology    What are your questions/concerns?:  Pt would like you to look at his echocardiogram and let him know your thoughts - once reviewed please route to nurse or midway pool once echo has been resulted     Okay to leave a detailed message?: No at Home number on file [phone] ([place])

## 2024-10-21 NOTE — TELEPHONE ENCOUNTER
"Patient returned call. Relayed PCP's response \"Echo stable, follow up in one year to make sure the aorta size is stable.\" Patient verbalized understanding. Patient has no further questions at this time.   "

## 2024-10-21 NOTE — TELEPHONE ENCOUNTER
See PCP's response, on 10/20/24, to patient's echo results on 10/14/24.    Writer called the patient and left a message to return call.    When the patient calls back, please relay to him the above provider's message.    Please route to the RN queue for any questions or concerns.    Jemma Briggs RN, BSN  [facility]

## 2024-11-08 ENCOUNTER — TRANSFERRED RECORDS (OUTPATIENT)
Dept: HEALTH INFORMATION MANAGEMENT | Facility: CLINIC | Age: 84
End: 2024-11-08
Payer: MEDICARE

## 2024-11-25 ENCOUNTER — OFFICE VISIT (OUTPATIENT)
Dept: CARDIOLOGY | Facility: CLINIC | Age: 84
End: 2024-11-25
Attending: STUDENT IN AN ORGANIZED HEALTH CARE EDUCATION/TRAINING PROGRAM
Payer: MEDICARE

## 2024-11-25 VITALS
SYSTOLIC BLOOD PRESSURE: 102 MMHG | HEART RATE: 62 BPM | BODY MASS INDEX: 37.44 KG/M2 | DIASTOLIC BLOOD PRESSURE: 66 MMHG | RESPIRATION RATE: 14 BRPM | WEIGHT: 225 LBS

## 2024-11-25 DIAGNOSIS — I49.49 JUNCTIONAL ESCAPE RHYTHM: ICD-10-CM

## 2024-11-25 DIAGNOSIS — I45.5 SINUS PAUSE: ICD-10-CM

## 2024-11-25 PROCEDURE — G2211 COMPLEX E/M VISIT ADD ON: HCPCS | Performed by: STUDENT IN AN ORGANIZED HEALTH CARE EDUCATION/TRAINING PROGRAM

## 2024-11-25 PROCEDURE — 99214 OFFICE O/P EST MOD 30 MIN: CPT | Performed by: STUDENT IN AN ORGANIZED HEALTH CARE EDUCATION/TRAINING PROGRAM

## 2024-11-25 NOTE — PROGRESS NOTES
Cardiology Clinic    Gagan Nieto is a 84 year old with a history of hypothyroidism, depression, and GERD who presents for evaluation of sinus node dysfunction.    He remains asymptomatic.  He walks daily without any chest pain, shortness of breath, palpitations, syncope, or fatigue.  He feels well and is encouraged by this.  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    General: Well appearing, appears stated age.  CV: Normal rate and rhythm. No m/r/g. No JVD.   Pulm: Normal effort. Normal breath sounds.  Lower extremities: No lower extremity edema.    Labs:   Reviewed in epic.  Creatinine 0.93  TSH 1.38  Hemoglobin 13.5  Platelets 133    Testing:  Holter monitor 9/2024: Sinus bradycardia, frequent sinus pauses, longest duration of 3.6 seconds.  TTE 9/2023: Dilated aortic root at 4.4 cm, EF 55 to 60%  TTE 10/2024: Normal EF, no wall motion abnormalities.  Aorta 4.4 cm at sinus of Valsalva      Assessment and Plan:    1.  Sinus bradycardia, frequent sinus pauses.  Continues to be asymptomatic.  Will continue to monitor.    2.  Ascending aortic aneurysm, measuring 4.4 cm.  Will monitor on TTE yearly.    The longitudinal plan of care for the diagnosis(es)/condition(s) as documented were addressed during this visit. Due to the added complexity in care, I will continue to support Father Bruce in the subsequent management and with ongoing continuity of care.    Return in about 1 year (around 11/25/2025).    Dimitri Benjamin MD  Interventional Cardiology

## 2024-11-25 NOTE — LETTER
11/25/2024    Miguelito Hall MD  [address] [facility] [address]    RE: Gagan Nieto       Dear Colleague,     I had the pleasure of seeing Gagan Nieto in the [facility].  Cardiology Clinic    Gagan Nieto is a 84 year old with a history of hypothyroidism, depression, and GERD who presents for evaluation of sinus node dysfunction.    He remains asymptomatic.  He walks daily without any chest pain, shortness of breath, palpitations, syncope, or fatigue.  He feels well and is encouraged by this.  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    General: Well appearing, appears stated age.  CV: Normal rate and rhythm. No m/r/g. No JVD.   Pulm: Normal effort. Normal breath sounds.  Lower extremities: No lower extremity edema.    Labs:   Reviewed in epic.  Creatinine 0.93  TSH 1.38  Hemoglobin 13.5  Platelets 133    Testing:  Holter monitor 9/2024: Sinus bradycardia, frequent sinus pauses, longest duration of 3.6 seconds.  TTE 9/2023: Dilated aortic root at 4.4 cm, EF 55 to 60%  TTE 10/2024: Normal EF, no wall motion abnormalities.  Aorta 4.4 cm at sinus of Valsalva      Assessment and Plan:    1.  Sinus bradycardia, frequent sinus pauses.  Continues to be asymptomatic.  Will continue to monitor.    2.  Ascending aortic aneurysm, measuring 4.4 cm.  Will monitor on TTE yearly.    The longitudinal plan of care for the diagnosis(es)/condition(s) as documented were addressed during this visit. Due to the added complexity in care, I will continue to support Father Bruce in the subsequent management and with ongoing continuity of care.    Return in about 1 year (around 11/25/2025).    Dimitri Benjamin MD  Interventional Cardiology      Thank you for allowing me to participate in the care of your patient.      Sincerely,     Dimitri Benjamin MD     [facility]  cc:   Dimitri  MD Cassidy  [address] [place] [address]

## 2024-11-26 ENCOUNTER — TRANSFERRED RECORDS (OUTPATIENT)
Dept: HEALTH INFORMATION MANAGEMENT | Facility: CLINIC | Age: 84
End: 2024-11-26
Payer: MEDICARE

## 2024-12-09 ENCOUNTER — OFFICE VISIT (OUTPATIENT)
Dept: INTERNAL MEDICINE | Facility: CLINIC | Age: 84
End: 2024-12-09
Payer: MEDICARE

## 2024-12-09 VITALS
OXYGEN SATURATION: 98 % | HEIGHT: 65 IN | SYSTOLIC BLOOD PRESSURE: 119 MMHG | TEMPERATURE: 97.6 F | WEIGHT: 215 LBS | HEART RATE: 52 BPM | BODY MASS INDEX: 35.82 KG/M2 | RESPIRATION RATE: 16 BRPM | DIASTOLIC BLOOD PRESSURE: 76 MMHG

## 2024-12-09 DIAGNOSIS — E03.9 HYPOTHYROIDISM, UNSPECIFIED TYPE: ICD-10-CM

## 2024-12-09 DIAGNOSIS — R00.1 SINUS BRADYCARDIA: Primary | ICD-10-CM

## 2024-12-09 DIAGNOSIS — I71.21 ANEURYSM OF ASCENDING AORTA WITHOUT RUPTURE (H): ICD-10-CM

## 2024-12-09 DIAGNOSIS — E66.01 MORBID OBESITY (H): ICD-10-CM

## 2024-12-09 PROCEDURE — G2211 COMPLEX E/M VISIT ADD ON: HCPCS | Performed by: INTERNAL MEDICINE

## 2024-12-09 PROCEDURE — 99214 OFFICE O/P EST MOD 30 MIN: CPT | Performed by: INTERNAL MEDICINE

## 2024-12-09 PROCEDURE — 36415 COLL VENOUS BLD VENIPUNCTURE: CPT | Performed by: INTERNAL MEDICINE

## 2024-12-09 PROCEDURE — 84443 ASSAY THYROID STIM HORMONE: CPT | Performed by: INTERNAL MEDICINE

## 2024-12-09 ASSESSMENT — PAIN SCALES - GENERAL: PAINLEVEL_OUTOF10: NO PAIN (0)

## 2024-12-09 NOTE — PROGRESS NOTES
"  Office Visit - Follow Up   Gagan Nieto   84 year old male    Date of Visit: 12/9/2024    Chief Complaint   Patient presents with    Follow Up     Heart issues and lab work        Assessment and Plan   1. Sinus bradycardia (Primary)  Reviewed TTE and visit with cardiology, stable, continue same    2. Aneurysm of ascending aorta without rupture (H)  TTE stable    3. Hypothyroidism, unspecified type  Continue levothyroxine  - TSH; Future  - TSH    4. Morbid obesity (H)  The following high BMI interventions were performed this visit: encouragement to exercise and lifestyle education regarding diet    Return in about 6 months (around 6/9/2025) for Follow up.     History of Present Illness   This 84 year old comes in for follow up        Physical Exam   General Appearance:   NAD    /76 (BP Location: Left arm, Patient Position: Sitting, Cuff Size: Adult Regular)   Pulse 52   Temp 97.6  F (36.4  C) (Tympanic)   Resp 16   Ht 1.651 m (5' 5\")   Wt 97.5 kg (215 lb)   SpO2 98%   BMI 35.78 kg/m           Additional Information   Current Outpatient Medications   Medication Sig Dispense Refill    amoxicillin (AMOXIL) 500 MG tablet Take 4 tablets by mouth as needed (only prior to dental appointments).      carboxymethylcellulose (REFRESH LIQUIGEL) 1 % ophthalmic solution Place 1 drop into both eyes 3 times daily.      fluticasone (FLONASE) 50 MCG/ACT nasal spray Spray 1 spray into both nostrils 2 times daily 24 g 3    hydrocortisone 2.5 % cream Apply topically as needed.      levothyroxine (SYNTHROID/LEVOTHROID) 100 MCG tablet Take 1 tablet (100 mcg) by mouth daily 90 tablet 4    Melatonin 10 MG TABS tablet Take 1 tablet (10 mg) by mouth nightly as needed for sleep      ofloxacin (FLOXIN) 0.3 % otic solution Place 5 drops Into the left ear 2 times daily.      omeprazole (PRILOSEC) 20 MG DR capsule Take 1 capsule (20 mg) by mouth daily 90 capsule 4    PARoxetine (PAXIL) 20 MG tablet Take 1 tablet (20 mg) by mouth " every morning 90 tablet 4    Polyethylene Glycol 3350 (MIRALAX PO) Take 17 g by mouth as needed.      rosuvastatin (CRESTOR) 20 MG tablet Take 1 tablet (20 mg) by mouth daily 90 tablet 4    triamcinolone (KENALOG) 0.1 % external cream Apply topically as needed.      UNABLE TO FIND Take 4 tablets by mouth daily. MEDICATION NAME: Phar bladder/urination control      vitamin D3 (CHOLECALCIFEROL) 50 mcg (2000 units) tablet Take 2 tablets (100 mcg) by mouth daily         Time:     The longitudinal plan of care for the diagnosis(es)/condition(s) as documented were addressed during this visit. Due to the added complexity in care, I will continue to support Father Bruce in the subsequent management and with ongoing continuity of care.     Miguelito Hall MD  Answers submitted by the patient for this visit:  General Questionnaire (Submitted on 12/9/2024)  Chief Complaint: Chronic problems general questions HPI Form  What is the reason for your visit today? : Follow up for cardiac issues and blood work  How many servings of fruits and vegetables do you eat daily?: 4 or more  On average, how many sweetened beverages do you drink each day (Examples: soda, juice, sweet tea, etc.  Do NOT count diet or artificially sweetened beverages)?: 0  How many minutes a day do you exercise enough to make your heart beat faster?: 9 or less  How many days a week do you exercise enough to make your heart beat faster?: 3 or less  How many days per week do you miss taking your medication?: 0  Questionnaire about: Chronic problems general questions HPI Form (Submitted on 12/9/2024)  Chief Complaint: Chronic problems general questions HPI Form

## 2024-12-10 LAB — TSH SERPL DL<=0.005 MIU/L-ACNC: 1.62 UIU/ML (ref 0.3–4.2)

## 2025-01-14 ENCOUNTER — TELEPHONE (OUTPATIENT)
Dept: INTERNAL MEDICINE | Facility: CLINIC | Age: 85
End: 2025-01-14
Payer: MEDICARE

## 2025-01-14 NOTE — TELEPHONE ENCOUNTER
January 14, 2025    Durable medical equipment/medical supply order was received via fax for Dr. Hall.  Patient label was attached to paperwork and placed in provider's inbox to be signed.    Aparna Leo

## 2025-01-16 NOTE — TELEPHONE ENCOUNTER
January 16, 2025    Durable medical equipment/medical supply order was picked up from outbox of Dr. Hall and sent via fax to [phone].    Maria A Kitchen

## 2025-02-06 DIAGNOSIS — E03.9 HYPOTHYROIDISM, UNSPECIFIED TYPE: ICD-10-CM

## 2025-02-06 RX ORDER — LEVOTHYROXINE SODIUM 100 UG/1
100 TABLET ORAL DAILY
Qty: 90 TABLET | Refills: 2 | Status: SHIPPED | OUTPATIENT
Start: 2025-02-06

## 2025-06-10 ENCOUNTER — OFFICE VISIT (OUTPATIENT)
Dept: INTERNAL MEDICINE | Facility: CLINIC | Age: 85
End: 2025-06-10
Payer: MEDICARE

## 2025-06-10 VITALS
BODY MASS INDEX: 34.41 KG/M2 | RESPIRATION RATE: 11 BRPM | HEIGHT: 65 IN | WEIGHT: 206.5 LBS | OXYGEN SATURATION: 94 % | DIASTOLIC BLOOD PRESSURE: 72 MMHG | SYSTOLIC BLOOD PRESSURE: 106 MMHG | TEMPERATURE: 97.9 F | HEART RATE: 56 BPM

## 2025-06-10 DIAGNOSIS — I71.21 ANEURYSM OF ASCENDING AORTA WITHOUT RUPTURE: ICD-10-CM

## 2025-06-10 DIAGNOSIS — R00.1 SINUS BRADYCARDIA: Primary | ICD-10-CM

## 2025-06-10 DIAGNOSIS — E03.9 HYPOTHYROIDISM, UNSPECIFIED TYPE: ICD-10-CM

## 2025-06-10 DIAGNOSIS — E78.2 MIXED HYPERLIPIDEMIA: ICD-10-CM

## 2025-06-10 LAB
ERYTHROCYTE [DISTWIDTH] IN BLOOD BY AUTOMATED COUNT: 13.2 % (ref 10–15)
HCT VFR BLD AUTO: 44.6 % (ref 40–53)
HGB BLD-MCNC: 14.8 G/DL (ref 13.3–17.7)
MCH RBC QN AUTO: 30.1 PG (ref 26.5–33)
MCHC RBC AUTO-ENTMCNC: 33.2 G/DL (ref 31.5–36.5)
MCV RBC AUTO: 91 FL (ref 78–100)
PLATELET # BLD AUTO: 138 10E3/UL (ref 150–450)
RBC # BLD AUTO: 4.91 10E6/UL (ref 4.4–5.9)
WBC # BLD AUTO: 5.7 10E3/UL (ref 4–11)

## 2025-06-10 PROCEDURE — 84443 ASSAY THYROID STIM HORMONE: CPT | Performed by: INTERNAL MEDICINE

## 2025-06-10 PROCEDURE — G2211 COMPLEX E/M VISIT ADD ON: HCPCS | Performed by: INTERNAL MEDICINE

## 2025-06-10 PROCEDURE — 99214 OFFICE O/P EST MOD 30 MIN: CPT | Performed by: INTERNAL MEDICINE

## 2025-06-10 PROCEDURE — 85027 COMPLETE CBC AUTOMATED: CPT | Performed by: INTERNAL MEDICINE

## 2025-06-10 PROCEDURE — 80048 BASIC METABOLIC PNL TOTAL CA: CPT | Performed by: INTERNAL MEDICINE

## 2025-06-10 PROCEDURE — 3078F DIAST BP <80 MM HG: CPT | Performed by: INTERNAL MEDICINE

## 2025-06-10 PROCEDURE — 36415 COLL VENOUS BLD VENIPUNCTURE: CPT | Performed by: INTERNAL MEDICINE

## 2025-06-10 PROCEDURE — 3074F SYST BP LT 130 MM HG: CPT | Performed by: INTERNAL MEDICINE

## 2025-06-10 ASSESSMENT — PATIENT HEALTH QUESTIONNAIRE - PHQ9
SUM OF ALL RESPONSES TO PHQ QUESTIONS 1-9: 0
SUM OF ALL RESPONSES TO PHQ QUESTIONS 1-9: 0
10. IF YOU CHECKED OFF ANY PROBLEMS, HOW DIFFICULT HAVE THESE PROBLEMS MADE IT FOR YOU TO DO YOUR WORK, TAKE CARE OF THINGS AT HOME, OR GET ALONG WITH OTHER PEOPLE: NOT DIFFICULT AT ALL

## 2025-06-10 NOTE — PROGRESS NOTES
"  Office Visit - Follow Up   Gagan Nieto   85 year old male    Date of Visit: 6/10/2025    Chief Complaint   Patient presents with    RECHECK     6-month health check        Assessment and Plan   1. Sinus bradycardia (Primary)  Continues to follow closely with cardiology    2. Hypothyroidism, unspecified type  Continue levothyroxine  - TSH; Future  - TSH    3. Aneurysm of ascending aorta without rupture  Stable echocardiogram    4. Mixed hyperlipidemia  Continue statin  - CBC with platelets; Future  - Basic metabolic panel; Future  - CBC with platelets  - Basic metabolic panel    Return in about 3 months (around 9/10/2025) for Routine preventive.     History of Present Illness   This 85 year old man comes in for follow-up.  Feeling well       Physical Exam   General Appearance:   No acute distress    /72 (BP Location: Left arm, Patient Position: Sitting, Cuff Size: Adult Regular)   Pulse 56   Temp 97.9  F (36.6  C) (Oral)   Resp 11   Ht 1.651 m (5' 5\")   Wt 93.7 kg (206 lb 8 oz)   SpO2 94%   BMI 34.36 kg/m      Heart rate is bradycardic rhythm regular lungs clear to auscultation bilaterally no edema     Additional Information   Current Outpatient Medications   Medication Sig Dispense Refill    amoxicillin (AMOXIL) 500 MG tablet Take 4 tablets by mouth as needed (only prior to dental appointments).      carboxymethylcellulose (REFRESH LIQUIGEL) 1 % ophthalmic solution Place 1 drop into both eyes 3 times daily.      fluticasone (FLONASE) 50 MCG/ACT nasal spray Spray 1 spray into both nostrils 2 times daily 24 g 3    hydrocortisone 2.5 % cream Apply topically as needed.      levothyroxine (SYNTHROID/LEVOTHROID) 100 MCG tablet Take 1 tablet (100 mcg) by mouth daily. 90 tablet 2    Melatonin 10 MG TABS tablet Take 1 tablet (10 mg) by mouth nightly as needed for sleep      ofloxacin (FLOXIN) 0.3 % otic solution Place 5 drops Into the left ear 2 times daily.      omeprazole (PRILOSEC) 20 MG DR capsule " Take 1 capsule (20 mg) by mouth daily 90 capsule 4    PARoxetine (PAXIL) 20 MG tablet Take 1 tablet (20 mg) by mouth every morning 90 tablet 4    Polyethylene Glycol 3350 (MIRALAX PO) Take 17 g by mouth as needed.      rosuvastatin (CRESTOR) 20 MG tablet Take 1 tablet (20 mg) by mouth daily 90 tablet 4    triamcinolone (KENALOG) 0.1 % external cream Apply topically as needed.      UNABLE TO FIND Take 4 tablets by mouth daily. MEDICATION NAME: Phar bladder/urination control      vitamin D3 (CHOLECALCIFEROL) 50 mcg (2000 units) tablet Take 2 tablets (100 mcg) by mouth daily         Time:     The longitudinal plan of care for the diagnosis(es)/condition(s) as documented were addressed during this visit. Due to the added complexity in care, I will continue to support Father Bruce in the subsequent management and with ongoing continuity of care.     Miguelito Hall MD  Answers submitted by the patient for this visit:  Patient Health Questionnaire (Submitted on 6/10/2025)  If you checked off any problems, how difficult have these problems made it for you to do your work, take care of things at home, or get along with other people?: Not difficult at all  PHQ9 TOTAL SCORE: 0  General Questionnaire (Submitted on 6/10/2025)  Chief Complaint: Chronic problems general questions HPI Form  What is the reason for your visit today? : 6-month health check-up  How many servings of fruits and vegetables do you eat daily?: 4 or more  On average, how many sweetened beverages do you drink each day (Examples: soda, juice, sweet tea, etc.  Do NOT count diet or artificially sweetened beverages)?: 0  How many minutes a day do you exercise enough to make your heart beat faster?: 60 or more  How many days a week do you exercise enough to make your heart beat faster?: 7  How many days per week do you miss taking your medication?: 0  Questionnaire about: Chronic problems general questions HPI Form (Submitted on 6/10/2025)  Chief Complaint:  Chronic problems general questions HPI Form

## 2025-06-11 ENCOUNTER — RESULTS FOLLOW-UP (OUTPATIENT)
Dept: INTERNAL MEDICINE | Facility: CLINIC | Age: 85
End: 2025-06-11
Payer: MEDICARE

## 2025-06-11 LAB
ANION GAP SERPL CALCULATED.3IONS-SCNC: 9 MMOL/L (ref 7–15)
BUN SERPL-MCNC: 14.8 MG/DL (ref 8–23)
CALCIUM SERPL-MCNC: 9.9 MG/DL (ref 8.8–10.4)
CHLORIDE SERPL-SCNC: 104 MMOL/L (ref 98–107)
CREAT SERPL-MCNC: 0.95 MG/DL (ref 0.67–1.17)
EGFRCR SERPLBLD CKD-EPI 2021: 77 ML/MIN/1.73M2
GLUCOSE SERPL-MCNC: 89 MG/DL (ref 70–99)
HCO3 SERPL-SCNC: 27 MMOL/L (ref 22–29)
POTASSIUM SERPL-SCNC: 4.8 MMOL/L (ref 3.4–5.3)
SODIUM SERPL-SCNC: 140 MMOL/L (ref 135–145)
TSH SERPL DL<=0.005 MIU/L-ACNC: 2.2 UIU/ML (ref 0.3–4.2)

## 2025-06-11 NOTE — LETTER
June 11, 2025      Father Bruce Nieto  [address]  [place] [address]        Dear ,    We are writing to inform you of your test results.    Your labs look okay/stable, which is excellent    Resulted Orders   TSH   Result Value Ref Range    TSH 2.20 0.30 - 4.20 uIU/mL   CBC with platelets   Result Value Ref Range    WBC Count 5.7 4.0 - 11.0 10e3/uL    RBC Count 4.91 4.40 - 5.90 10e6/uL    Hemoglobin 14.8 13.3 - 17.7 g/dL    Hematocrit 44.6 40.0 - 53.0 %    MCV 91 78 - 100 fL    MCH 30.1 26.5 - 33.0 pg    MCHC 33.2 31.5 - 36.5 g/dL    RDW 13.2 10.0 - 15.0 %    Platelet Count 138 (L) 150 - 450 10e3/uL   Basic metabolic panel   Result Value Ref Range    Sodium 140 135 - 145 mmol/L    Potassium 4.8 3.4 - 5.3 mmol/L    Chloride 104 98 - 107 mmol/L    Carbon Dioxide (CO2) 27 22 - 29 mmol/L    Anion Gap 9 7 - 15 mmol/L    Urea Nitrogen 14.8 8.0 - 23.0 mg/dL    Creatinine 0.95 0.67 - 1.17 mg/dL    GFR Estimate 77 >60 mL/min/1.73m2      Comment:      eGFR calculated using 2021 CKD-EPI equation.    Calcium 9.9 8.8 - 10.4 mg/dL    Glucose 89 70 - 99 mg/dL       If you have any questions or concerns, please call the clinic at the number listed above.       Sincerely,      Miguelito Hall MD    Electronically signed

## 2025-07-09 DIAGNOSIS — F33.42 RECURRENT MAJOR DEPRESSION IN FULL REMISSION: ICD-10-CM

## 2025-07-09 DIAGNOSIS — K21.9 GASTROESOPHAGEAL REFLUX DISEASE WITHOUT ESOPHAGITIS: ICD-10-CM

## 2025-07-09 DIAGNOSIS — E78.2 MIXED HYPERLIPIDEMIA: ICD-10-CM

## 2025-07-10 RX ORDER — OMEPRAZOLE 20 MG/1
20 CAPSULE, DELAYED RELEASE ORAL DAILY
Qty: 90 CAPSULE | Refills: 4 | Status: SHIPPED | OUTPATIENT
Start: 2025-07-10

## 2025-07-10 RX ORDER — PAROXETINE 20 MG/1
20 TABLET, FILM COATED ORAL EVERY MORNING
Qty: 90 TABLET | Refills: 4 | Status: SHIPPED | OUTPATIENT
Start: 2025-07-10

## 2025-07-10 RX ORDER — ROSUVASTATIN CALCIUM 20 MG/1
20 TABLET, COATED ORAL DAILY
Qty: 90 TABLET | Refills: 4 | Status: SHIPPED | OUTPATIENT
Start: 2025-07-10

## 2025-08-11 ENCOUNTER — PATIENT OUTREACH (OUTPATIENT)
Dept: CARE COORDINATION | Facility: CLINIC | Age: 85
End: 2025-08-11
Payer: MEDICARE

## 2025-08-21 ENCOUNTER — LAB (OUTPATIENT)
Dept: LAB | Facility: CLINIC | Age: 85
End: 2025-08-21
Payer: MEDICARE

## 2025-08-21 DIAGNOSIS — E78.2 MIXED HYPERLIPIDEMIA: ICD-10-CM

## 2025-08-21 DIAGNOSIS — K59.00 CONSTIPATION: ICD-10-CM

## 2025-08-21 DIAGNOSIS — R19.4 CHANGE IN BOWEL HABITS: ICD-10-CM

## 2025-08-21 DIAGNOSIS — R10.30 LOWER ABDOMINAL PAIN: ICD-10-CM

## 2025-08-21 DIAGNOSIS — Z13.6 SCREENING FOR CARDIOVASCULAR CONDITION: Primary | ICD-10-CM

## 2025-08-21 LAB
ALBUMIN SERPL BCG-MCNC: 4.1 G/DL (ref 3.5–5.2)
ALP SERPL-CCNC: 55 U/L (ref 40–150)
ALT SERPL W P-5'-P-CCNC: 33 U/L (ref 0–70)
ANION GAP SERPL CALCULATED.3IONS-SCNC: 11 MMOL/L (ref 7–15)
AST SERPL W P-5'-P-CCNC: 38 U/L (ref 0–45)
BASOPHILS # BLD AUTO: <0.04 10E3/UL (ref 0–0.2)
BASOPHILS NFR BLD AUTO: 0.4 %
BILIRUB SERPL-MCNC: 0.6 MG/DL
BUN SERPL-MCNC: 17.9 MG/DL (ref 8–23)
CALCIUM SERPL-MCNC: 9.3 MG/DL (ref 8.8–10.4)
CHLORIDE SERPL-SCNC: 103 MMOL/L (ref 98–107)
CHOLEST SERPL-MCNC: 118 MG/DL
CREAT SERPL-MCNC: 0.82 MG/DL (ref 0.67–1.17)
EGFRCR SERPLBLD CKD-EPI 2021: 85 ML/MIN/1.73M2
EOSINOPHIL # BLD AUTO: 0.16 10E3/UL (ref 0–0.7)
EOSINOPHIL NFR BLD AUTO: 2.9 %
ERYTHROCYTE [DISTWIDTH] IN BLOOD BY AUTOMATED COUNT: 13.2 % (ref 10–15)
FASTING STATUS PATIENT QL REPORTED: YES
FASTING STATUS PATIENT QL REPORTED: YES
GLUCOSE SERPL-MCNC: 84 MG/DL (ref 70–99)
HCO3 SERPL-SCNC: 26 MMOL/L (ref 22–29)
HCT VFR BLD AUTO: 40.9 % (ref 40–53)
HDLC SERPL-MCNC: 50 MG/DL
HGB BLD-MCNC: 13.6 G/DL (ref 13.3–17.7)
IMM GRANULOCYTES # BLD: <0.04 10E3/UL
IMM GRANULOCYTES NFR BLD: 0 %
LDLC SERPL CALC-MCNC: 57 MG/DL
LYMPHOCYTES # BLD AUTO: 2.85 10E3/UL (ref 0.8–5.3)
LYMPHOCYTES NFR BLD AUTO: 52.4 %
MCH RBC QN AUTO: 30.4 PG (ref 26.5–33)
MCHC RBC AUTO-ENTMCNC: 33.3 G/DL (ref 31.5–36.5)
MCV RBC AUTO: 91.5 FL (ref 78–100)
MONOCYTES # BLD AUTO: 0.43 10E3/UL (ref 0–1.3)
MONOCYTES NFR BLD AUTO: 7.9 %
NEUTROPHILS # BLD AUTO: 1.98 10E3/UL (ref 1.6–8.3)
NEUTROPHILS NFR BLD AUTO: 36.4 %
NONHDLC SERPL-MCNC: 68 MG/DL
PLATELET # BLD AUTO: 135 10E3/UL (ref 150–450)
POTASSIUM SERPL-SCNC: 4.1 MMOL/L (ref 3.4–5.3)
PROT SERPL-MCNC: 6.5 G/DL (ref 6.4–8.3)
RBC # BLD AUTO: 4.47 10E6/UL (ref 4.4–5.9)
SODIUM SERPL-SCNC: 140 MMOL/L (ref 135–145)
TRIGL SERPL-MCNC: 54 MG/DL
TSH SERPL DL<=0.005 MIU/L-ACNC: 1.5 UIU/ML (ref 0.3–4.2)
WBC # BLD AUTO: 5.44 10E3/UL (ref 4–11)

## 2025-08-22 ENCOUNTER — HOSPITAL ENCOUNTER (OUTPATIENT)
Dept: CT IMAGING | Facility: HOSPITAL | Age: 85
Discharge: HOME | End: 2025-08-22
Attending: INTERNAL MEDICINE | Admitting: INTERNAL MEDICINE
Payer: MEDICARE

## 2025-08-22 DIAGNOSIS — R19.4 CHANGE IN BOWEL HABITS: ICD-10-CM

## 2025-08-22 DIAGNOSIS — R10.30 LOWER ABDOMINAL PAIN: ICD-10-CM

## 2025-08-22 DIAGNOSIS — K59.00 CONSTIPATION, UNSPECIFIED CONSTIPATION TYPE: ICD-10-CM

## 2025-08-22 PROCEDURE — 250N000011 HC RX IP 250 OP 636: Performed by: INTERNAL MEDICINE

## 2025-08-22 PROCEDURE — 74177 CT ABD & PELVIS W/CONTRAST: CPT

## 2025-08-22 PROCEDURE — 250N000009 HC RX 250: Performed by: INTERNAL MEDICINE

## 2025-08-22 RX ORDER — IOPAMIDOL 755 MG/ML
90 INJECTION, SOLUTION INTRAVASCULAR ONCE
Status: COMPLETED | OUTPATIENT
Start: 2025-08-22 | End: 2025-08-22

## 2025-08-22 RX ADMIN — IOPAMIDOL 90 ML: 755 INJECTION, SOLUTION INTRAVENOUS at 16:28

## 2025-08-22 RX ADMIN — SODIUM CHLORIDE 100 ML: 9 INJECTION, SOLUTION INTRAVENOUS at 16:35

## 2025-08-25 ENCOUNTER — PATIENT OUTREACH (OUTPATIENT)
Dept: CARE COORDINATION | Facility: CLINIC | Age: 85
End: 2025-08-25
Payer: MEDICARE

## 2025-08-26 ENCOUNTER — TRANSFERRED RECORDS (OUTPATIENT)
Dept: ADMINISTRATIVE | Facility: CLINIC | Age: 85
End: 2025-08-26
Payer: MEDICARE

## 2025-09-02 ENCOUNTER — TRANSFERRED RECORDS (OUTPATIENT)
Dept: ADMINISTRATIVE | Facility: CLINIC | Age: 85
End: 2025-09-02
Payer: MEDICARE